# Patient Record
Sex: MALE | Race: WHITE | NOT HISPANIC OR LATINO | Employment: UNEMPLOYED | URBAN - METROPOLITAN AREA
[De-identification: names, ages, dates, MRNs, and addresses within clinical notes are randomized per-mention and may not be internally consistent; named-entity substitution may affect disease eponyms.]

---

## 2019-09-26 ENCOUNTER — OFFICE VISIT (OUTPATIENT)
Dept: ENDOCRINOLOGY | Facility: CLINIC | Age: 49
End: 2019-09-26
Payer: MEDICAID

## 2019-09-26 VITALS
DIASTOLIC BLOOD PRESSURE: 80 MMHG | RESPIRATION RATE: 18 BRPM | HEART RATE: 74 BPM | BODY MASS INDEX: 37.01 KG/M2 | HEIGHT: 74 IN | WEIGHT: 288.38 LBS | SYSTOLIC BLOOD PRESSURE: 130 MMHG

## 2019-09-26 DIAGNOSIS — E66.9 OBESITY (BMI 30-39.9): ICD-10-CM

## 2019-09-26 DIAGNOSIS — E11.65 TYPE 2 DIABETES MELLITUS WITH HYPERGLYCEMIA, WITHOUT LONG-TERM CURRENT USE OF INSULIN: Primary | ICD-10-CM

## 2019-09-26 PROCEDURE — 99203 OFFICE O/P NEW LOW 30 MIN: CPT | Mod: PBBFAC | Performed by: INTERNAL MEDICINE

## 2019-09-26 PROCEDURE — 99999 PR PBB SHADOW E&M-NEW PATIENT-LVL III: ICD-10-PCS | Mod: PBBFAC,,, | Performed by: INTERNAL MEDICINE

## 2019-09-26 PROCEDURE — 99204 PR OFFICE/OUTPT VISIT, NEW, LEVL IV, 45-59 MIN: ICD-10-PCS | Mod: S$PBB,,, | Performed by: INTERNAL MEDICINE

## 2019-09-26 PROCEDURE — 99204 OFFICE O/P NEW MOD 45 MIN: CPT | Mod: S$PBB,,, | Performed by: INTERNAL MEDICINE

## 2019-09-26 PROCEDURE — 99999 PR PBB SHADOW E&M-NEW PATIENT-LVL III: CPT | Mod: PBBFAC,,, | Performed by: INTERNAL MEDICINE

## 2019-09-26 RX ORDER — LORATADINE 10 MG/1
TABLET ORAL
COMMUNITY
Start: 2018-01-23

## 2019-09-26 RX ORDER — PEN NEEDLE, DIABETIC 30 GX3/16"
NEEDLE, DISPOSABLE MISCELLANEOUS
Qty: 100 EACH | Refills: 2 | Status: SHIPPED | OUTPATIENT
Start: 2019-09-26 | End: 2020-09-22 | Stop reason: SDUPTHER

## 2019-09-26 RX ORDER — ASPIRIN 81 MG/1
TABLET ORAL
COMMUNITY

## 2019-09-26 RX ORDER — METFORMIN HYDROCHLORIDE 1000 MG/1
TABLET ORAL
COMMUNITY
Start: 2017-05-10 | End: 2019-10-24 | Stop reason: SDUPTHER

## 2019-09-26 RX ORDER — LISINOPRIL 5 MG/1
TABLET ORAL
COMMUNITY
End: 2019-12-17

## 2019-09-26 RX ORDER — INSULIN LISPRO 100 [IU]/ML
INJECTION, SOLUTION INTRAVENOUS; SUBCUTANEOUS
Qty: 15 ML | Refills: 1 | Status: SHIPPED | OUTPATIENT
Start: 2019-09-26 | End: 2019-10-24

## 2019-09-26 RX ORDER — SIMVASTATIN 20 MG/1
TABLET, FILM COATED ORAL
COMMUNITY
Start: 2017-05-10 | End: 2020-10-07 | Stop reason: ALTCHOICE

## 2019-09-26 NOTE — PATIENT INSTRUCTIONS
Humalog fast acting Insulin :  7 UNITS AT BREAKFAST, AND  5 UNITS AT SUPPER only  Starting today.  In 2 days increase the dose by two units each if blood sugar does not improve a lot.    ( Today take with lunch 4 units only)    ------------------------------------------------------------  Trulicity injection once a week.  Pancreatitis is a rare complication.  ------------------------------------------------------------  Check your blood sugar before meals and at bedtime.

## 2019-09-26 NOTE — PROGRESS NOTES
Self referred   Patient ID: Jean Claude Ocasio is a 49 y.o. male.    Chief Complaint:    HPI  Patient visited emergency room 2 days ago because he had  Headache, lost voice, and sinus problem. Headache resolved.  Just some voice problem left. Feeling tired.  4 y ago c cold, bs increased a lot.  DM diagnosed: 7-8 years  Oral medication: Januvia 50 mg  and Metformin 1000 mg bid  Insulin: no   Frequency of CBGS: 3-4 times  CBGs: 160 am  300 post breakfast  Blood sugar does not increase about usually after supper  No complaints of hypoglycemia  Eye exam within last year: 3m ago.  blurred vision.  Kidney problem: no  Pain or numbness in feet: no  Taking blood pressure medication: occ  Taking cholesterol medication: not taking  Following diet for diabetes: yes    No complaints of  dysphagia, n/v, cp, sob, abd pain, rash, or edema.     No FH of pancreatitis, or thyroid cancer        Patient is a .  Social History     Socioeconomic History    Marital status:      Spouse name: Not on file    Number of children: Not on file    Years of education: Not on file    Highest education level: Not on file   Occupational History    Not on file   Social Needs    Financial resource strain: Not on file    Food insecurity:     Worry: Not on file     Inability: Not on file    Transportation needs:     Medical: Not on file     Non-medical: Not on file   Tobacco Use    Smoking status: Never Smoker   Substance and Sexual Activity    Alcohol use: Not on file    Drug use: Not on file    Sexual activity: Not on file   Lifestyle    Physical activity:     Days per week: Not on file     Minutes per session: Not on file    Stress: Not on file   Relationships    Social connections:     Talks on phone: Not on file     Gets together: Not on file     Attends Druze service: Not on file     Active member of club or organization: Not on file     Attends meetings of clubs or organizations: Not on file     Relationship  "status: Not on file   Other Topics Concern    Not on file   Social History Narrative    Not on file       ROS:   Included in HPI  + Diabetes   No chest pain or shortness of breath  No abdominal pain , nausea or vomiting  ROS otherwise neg except for what is mentioned in the PMH, PSH and HPI    PE:  Vitals:    09/26/19 0820   Resp: 18     Alert and oriented  No acute distress  + Obesity  Diffuse vitiligo in different areas of the body  Body mass index is 37.02 kg/m².  No Acanthosis  No acne  No Proptosis or conjunctivitis  No rash on tongue, +teeth  Nose nl  No goitre by inspection  Thyroid gland is not palpable  No cervical lymphadenopathy  Heart reg, no gallop  Lungs cta, no wheezing  Abd soft, no tnd  No edema in lower legs  No ulcers in feet  Normal sensation by microfilament test  No rash  No bruises  Speech normal  Behavior normal  No tremor      Lab Review:     A/P  Type 2 diabetes mellitus with hyperglycemia, without long-term current use of insulin  Uncontrolled diabetes  -     Comprehensive metabolic panel; Future; Expected date: 09/26/2019  -     Hemoglobin A1c; Future; Expected date: 09/26/2019    Obesity (BMI 30-39.9)  -     Comprehensive metabolic panel; Future; Expected date: 09/26/2019  -     Hemoglobin A1c; Future; Expected date: 09/26/2019    Other orders  -     dulaglutide (TRULICITY) 0.75 mg/0.5 mL PnIj; Inject 0.5 ml (0.75 MG) WEEKLY  Dispense: 2 mL; Refill: 2  -     insulin lispro (HUMALOG KWIKPEN INSULIN) 100 unit/mL pen; 5 UNITS AT BREAKFAST, AND 7 UNITS AT SUPPER  Dispense: 15 mL; Refill: 1  -     pen needle, diabetic 31 gauge x 5/16" Ndle; Use as directed  Dispense: 100 each; Refill: 2  Patient Instructions discussed     Humalog fast acting Insulin :  7 UNITS AT BREAKFAST, AND  5 UNITS AT SUPPER only  Starting today.  In 2 days increase the dose by two units each if blood sugar does not improve a lot.    ( Today take with lunch 4 units " only)    ------------------------------------------------------------  Trulicity injection once a week.  Pancreatitis is a rare complication.  ------------------------------------------------------------  Check your blood sugar before meals and at bedtime.  CBG log to be sent in couple days    Follow-up visit in 4 weeks.      Patient understands and agrees on the plan.

## 2019-09-27 ENCOUNTER — PATIENT MESSAGE (OUTPATIENT)
Dept: ENDOCRINOLOGY | Facility: CLINIC | Age: 49
End: 2019-09-27

## 2019-09-30 ENCOUNTER — TELEPHONE (OUTPATIENT)
Dept: ENDOCRINOLOGY | Facility: CLINIC | Age: 49
End: 2019-09-30

## 2019-09-30 RX ORDER — INSULIN GLARGINE 300 [IU]/ML
INJECTION, SOLUTION SUBCUTANEOUS
Qty: 15 ML | Refills: 1 | Status: SHIPPED | OUTPATIENT
Start: 2019-09-30 | End: 2019-10-01

## 2019-09-30 NOTE — TELEPHONE ENCOUNTER
I have discussed with the patient regarding his insulin pens.  Toujeo 10 units once a day to be added.

## 2019-10-01 RX ORDER — INSULIN GLARGINE 300 [IU]/ML
INJECTION, SOLUTION SUBCUTANEOUS
Qty: 6 ML | Refills: 1 | Status: SHIPPED | OUTPATIENT
Start: 2019-10-01 | End: 2019-10-24

## 2019-10-01 RX ORDER — INSULIN GLARGINE 300 [IU]/ML
INJECTION, SOLUTION SUBCUTANEOUS
Qty: 15 ML | Refills: 1 | OUTPATIENT
Start: 2019-10-01

## 2019-10-07 ENCOUNTER — TELEPHONE (OUTPATIENT)
Dept: ENDOCRINOLOGY | Facility: CLINIC | Age: 49
End: 2019-10-07

## 2019-10-07 ENCOUNTER — PATIENT MESSAGE (OUTPATIENT)
Dept: ENDOCRINOLOGY | Facility: CLINIC | Age: 49
End: 2019-10-07

## 2019-10-07 NOTE — TELEPHONE ENCOUNTER
return call to pt left message to call back or portal message if needs to speak to someone before Dr. pack gets in

## 2019-10-24 ENCOUNTER — LAB VISIT (OUTPATIENT)
Dept: LAB | Facility: HOSPITAL | Age: 49
End: 2019-10-24
Attending: INTERNAL MEDICINE
Payer: MEDICAID

## 2019-10-24 ENCOUNTER — OFFICE VISIT (OUTPATIENT)
Dept: ENDOCRINOLOGY | Facility: CLINIC | Age: 49
End: 2019-10-24
Payer: MEDICAID

## 2019-10-24 VITALS
HEIGHT: 74 IN | TEMPERATURE: 99 F | WEIGHT: 291 LBS | HEART RATE: 84 BPM | BODY MASS INDEX: 37.35 KG/M2 | DIASTOLIC BLOOD PRESSURE: 89 MMHG | SYSTOLIC BLOOD PRESSURE: 130 MMHG

## 2019-10-24 DIAGNOSIS — E11.65 UNCONTROLLED TYPE 2 DIABETES MELLITUS WITH HYPERGLYCEMIA: ICD-10-CM

## 2019-10-24 DIAGNOSIS — E11.65 TYPE 2 DIABETES MELLITUS WITH HYPERGLYCEMIA, WITHOUT LONG-TERM CURRENT USE OF INSULIN: ICD-10-CM

## 2019-10-24 DIAGNOSIS — E66.9 OBESITY (BMI 30-39.9): ICD-10-CM

## 2019-10-24 DIAGNOSIS — M79.672 PAIN OF LEFT HEEL: ICD-10-CM

## 2019-10-24 DIAGNOSIS — E11.65 TYPE 2 DIABETES MELLITUS WITH HYPERGLYCEMIA, WITHOUT LONG-TERM CURRENT USE OF INSULIN: Primary | ICD-10-CM

## 2019-10-24 LAB
ALBUMIN SERPL BCP-MCNC: 3.8 G/DL (ref 3.5–5.2)
ALP SERPL-CCNC: 56 U/L (ref 55–135)
ALT SERPL W/O P-5'-P-CCNC: 78 U/L (ref 10–44)
ANION GAP SERPL CALC-SCNC: 9 MMOL/L (ref 8–16)
AST SERPL-CCNC: 63 U/L (ref 10–40)
BILIRUB SERPL-MCNC: 0.3 MG/DL (ref 0.1–1)
BUN SERPL-MCNC: 14 MG/DL (ref 6–20)
CALCIUM SERPL-MCNC: 8.9 MG/DL (ref 8.7–10.5)
CHLORIDE SERPL-SCNC: 102 MMOL/L (ref 95–110)
CO2 SERPL-SCNC: 25 MMOL/L (ref 23–29)
CREAT SERPL-MCNC: 0.9 MG/DL (ref 0.5–1.4)
EST. GFR  (AFRICAN AMERICAN): >60 ML/MIN/1.73 M^2
EST. GFR  (NON AFRICAN AMERICAN): >60 ML/MIN/1.73 M^2
GLUCOSE SERPL-MCNC: 127 MG/DL (ref 70–110)
GLUCOSE SERPL-MCNC: 224 MG/DL (ref 70–110)
POTASSIUM SERPL-SCNC: 4 MMOL/L (ref 3.5–5.1)
PROT SERPL-MCNC: 7.2 G/DL (ref 6–8.4)
SODIUM SERPL-SCNC: 136 MMOL/L (ref 136–145)

## 2019-10-24 PROCEDURE — 99214 OFFICE O/P EST MOD 30 MIN: CPT | Mod: S$PBB,,, | Performed by: INTERNAL MEDICINE

## 2019-10-24 PROCEDURE — 80053 COMPREHEN METABOLIC PANEL: CPT

## 2019-10-24 PROCEDURE — 99999 PR PBB SHADOW E&M-EST. PATIENT-LVL III: CPT | Mod: PBBFAC,,, | Performed by: INTERNAL MEDICINE

## 2019-10-24 PROCEDURE — 36415 COLL VENOUS BLD VENIPUNCTURE: CPT

## 2019-10-24 PROCEDURE — 99999 PR PBB SHADOW E&M-EST. PATIENT-LVL III: ICD-10-PCS | Mod: PBBFAC,,, | Performed by: INTERNAL MEDICINE

## 2019-10-24 PROCEDURE — 99213 OFFICE O/P EST LOW 20 MIN: CPT | Mod: PBBFAC | Performed by: INTERNAL MEDICINE

## 2019-10-24 PROCEDURE — 83036 HEMOGLOBIN GLYCOSYLATED A1C: CPT

## 2019-10-24 PROCEDURE — 82962 GLUCOSE BLOOD TEST: CPT | Mod: PBBFAC | Performed by: INTERNAL MEDICINE

## 2019-10-24 PROCEDURE — 99214 PR OFFICE/OUTPT VISIT, EST, LEVL IV, 30-39 MIN: ICD-10-PCS | Mod: S$PBB,,, | Performed by: INTERNAL MEDICINE

## 2019-10-24 RX ORDER — BLOOD-GLUCOSE CONTROL, NORMAL
EACH MISCELLANEOUS
Qty: 150 EACH | Refills: 5 | Status: SHIPPED | OUTPATIENT
Start: 2019-10-24 | End: 2021-07-09 | Stop reason: SDUPTHER

## 2019-10-24 RX ORDER — INSULIN PUMP SYRINGE, 3 ML
EACH MISCELLANEOUS
Qty: 1 EACH | Refills: 0 | Status: SHIPPED | OUTPATIENT
Start: 2019-10-24 | End: 2020-11-09 | Stop reason: SDUPTHER

## 2019-10-24 RX ORDER — GLIMEPIRIDE 4 MG/1
4 TABLET ORAL
Qty: 135 TABLET | Refills: 1 | Status: SHIPPED | OUTPATIENT
Start: 2019-10-24 | End: 2020-01-21 | Stop reason: SDUPTHER

## 2019-10-24 RX ORDER — GLIMEPIRIDE 4 MG/1
4 TABLET ORAL
Qty: 135 TABLET | Refills: 1 | Status: SHIPPED | OUTPATIENT
Start: 2019-10-24 | End: 2019-10-24 | Stop reason: SDUPTHER

## 2019-10-24 RX ORDER — METFORMIN HYDROCHLORIDE 1000 MG/1
TABLET ORAL
Qty: 60 TABLET | Refills: 2 | Status: SHIPPED | OUTPATIENT
Start: 2019-10-24 | End: 2019-10-29 | Stop reason: SDUPTHER

## 2019-10-24 NOTE — PROGRESS NOTES
Self referred   Patient ID: Jean Claude Ocasio is a 49 y.o. male.    Chief Complaint:    HPI  DM diagnosed: 7-8 years  Oral medication:   Off Januvia 50 mg  Metformin 1000 mg bid  ? Glyburide 4 mg BID  PATIENT QUIT TAKING INSULIN INJECTIONS FEW DAYS AFTER LAST VISIT BECAUSE  HE WAS NOT FEELING WELL AND HE FELT LIKE A DIFFERENT PERSON, AND EVEN HIS  WIFE ASKED HIM WHAT WAS WRONG WITH HIM.  HIS BLOOD SUGAR PROBABLY WAS  IN 80S SOMETIMES WHEN HE WAS ON INSULIN.  IT IS NOT CLEAR IF HE HAD HYPOGLYCEMIA  HE DECIDED TO STOP THE INSULIN AND TO TAKE ONLY METFORMIN AND  HE STARTED TAKING GLYBURIDE 4 MG TWICE A DAY PER PATIENT  IT IS NOT CLEAR IF HE MEANS GLIMEPIRIDE INSTEAD OF GLYBURIDE  HIS BLOOD SUGAR RECENTLY HAS BEEN 100-170  NO CBG LOG AVAILABLE  Frequency of CBGS 4 times  Kidney problem: no  Pain or numbness in feet: no  Taking blood pressure medication: occ  Taking cholesterol medication: not taking    No complaints of  dysphagia, n/v, cp, sob, abd pain, rash, or edema.     No FH of pancreatitis, or thyroid cancer        Patient is a .  Social History     Socioeconomic History    Marital status:      Spouse name: Not on file    Number of children: Not on file    Years of education: Not on file    Highest education level: Not on file   Occupational History    Not on file   Social Needs    Financial resource strain: Not on file    Food insecurity:     Worry: Not on file     Inability: Not on file    Transportation needs:     Medical: Not on file     Non-medical: Not on file   Tobacco Use    Smoking status: Never Smoker   Substance and Sexual Activity    Alcohol use: Not on file    Drug use: Not on file    Sexual activity: Not on file   Lifestyle    Physical activity:     Days per week: Not on file     Minutes per session: Not on file    Stress: Not on file   Relationships    Social connections:     Talks on phone: Not on file     Gets together: Not on file     Attends Islam service: Not  on file     Active member of club or organization: Not on file     Attends meetings of clubs or organizations: Not on file     Relationship status: Not on file   Other Topics Concern    Not on file   Social History Narrative    Not on file       ROS:   Included in HPI  + Diabetes   No chest pain or shortness of breath  No abdominal pain , nausea or vomiting  CHRONIC PAIN IN LEFT HEEL  Left heel pain occurs for like 1 month and it is mostly 1st thing in the morning  ROS otherwise neg except for what is mentioned in the PMH, PSH and HPI    PE:  Vitals:    10/24/19 1505   BP: 130/89   Pulse: 84   Temp: 98.7 °F (37.1 °C)     Alert and oriented  No acute distress  + Obesity  Diffuse vitiligo in different areas of the body  Body mass index is 37.36 kg/m².  No Proptosis or conjunctivitis  No goitre by inspection  Thyroid gland is not palpable  Heart reg, no gallop  Lungs cta, no wheezing  No edema in lower legs  Normal sensation by microfilament test  Speech normal  Behavior normal  No tremor      Lab Review:     A/P  Type 2 diabetes mellitus with hyperglycemia, without long-term current use of insulin  Uncontrolled diabetes  Obesity (BMI 30-39.9)  OFF INSULIN SEC TO SIDE EFFECTS  Patient does not want to take insulin and he was to continue taking metformin and glyburide or glimepiride  Off Trulicity injection   Blood test from today is pending    Pain in left heel is likely secondary to Achilles tendonitis  Orders Placed This Encounter   Procedures    Ambulatory Referral to Podiatry     Referral Priority:   Routine     Referral Type:   Consultation     Referral Reason:   Specialty Services Required     Requested Specialty:   Podiatry     Number of Visits Requested:   1       Patient is tablet she primary care physician.  Follow-up visit in 3 months.      Patient understands and agrees on the plan.

## 2019-10-25 LAB
ESTIMATED AVG GLUCOSE: 174 MG/DL (ref 68–131)
HBA1C MFR BLD HPLC: 7.7 % (ref 4–5.6)

## 2019-10-28 ENCOUNTER — HOSPITAL ENCOUNTER (OUTPATIENT)
Dept: RADIOLOGY | Facility: HOSPITAL | Age: 49
Discharge: HOME | End: 2019-10-28
Attending: PODIATRIST
Payer: MEDICAID

## 2019-10-28 ENCOUNTER — OFFICE VISIT (OUTPATIENT)
Dept: PODIATRY | Facility: CLINIC | Age: 49
End: 2019-10-28
Payer: MEDICAID

## 2019-10-28 VITALS
HEIGHT: 74 IN | SYSTOLIC BLOOD PRESSURE: 137 MMHG | WEIGHT: 290.44 LBS | HEART RATE: 87 BPM | DIASTOLIC BLOOD PRESSURE: 88 MMHG | BODY MASS INDEX: 37.27 KG/M2

## 2019-10-28 DIAGNOSIS — M79.672 PAIN OF LEFT HEEL: ICD-10-CM

## 2019-10-28 DIAGNOSIS — M72.2 PLANTAR FASCIITIS: Primary | ICD-10-CM

## 2019-10-28 DIAGNOSIS — M79.672 PAIN OF LEFT HEEL: Primary | ICD-10-CM

## 2019-10-28 DIAGNOSIS — M79.673 INFLAMMATORY HEEL PAIN, UNSPECIFIED LATERALITY: ICD-10-CM

## 2019-10-28 PROCEDURE — 99999 PR PBB SHADOW E&M-EST. PATIENT-LVL III: ICD-10-PCS | Mod: PBBFAC,,, | Performed by: PODIATRIST

## 2019-10-28 PROCEDURE — 99203 OFFICE O/P NEW LOW 30 MIN: CPT | Mod: S$PBB,,, | Performed by: PODIATRIST

## 2019-10-28 PROCEDURE — 73650 X-RAY EXAM OF HEEL: CPT | Mod: 26,LT,, | Performed by: RADIOLOGY

## 2019-10-28 PROCEDURE — 99203 PR OFFICE/OUTPT VISIT, NEW, LEVL III, 30-44 MIN: ICD-10-PCS | Mod: S$PBB,,, | Performed by: PODIATRIST

## 2019-10-28 PROCEDURE — 73650 XR CALCANEUS 2 VIEW LEFT: ICD-10-PCS | Mod: 26,LT,, | Performed by: RADIOLOGY

## 2019-10-28 PROCEDURE — 73650 X-RAY EXAM OF HEEL: CPT | Mod: TC,LT

## 2019-10-28 PROCEDURE — 99999 PR PBB SHADOW E&M-EST. PATIENT-LVL III: CPT | Mod: PBBFAC,,, | Performed by: PODIATRIST

## 2019-10-28 PROCEDURE — 99213 OFFICE O/P EST LOW 20 MIN: CPT | Mod: PBBFAC,25 | Performed by: PODIATRIST

## 2019-10-28 RX ORDER — MELOXICAM 15 MG/1
15 TABLET ORAL DAILY
Qty: 20 TABLET | Refills: 0 | Status: SHIPPED | OUTPATIENT
Start: 2019-10-28 | End: 2019-12-02 | Stop reason: SDUPTHER

## 2019-10-28 NOTE — PROGRESS NOTES
Subjective:       Patient ID: Jean Claude Ocasio is a 49 y.o. male.    Chief Complaint: Foot Pain (Pt c/o heel pain in the R. foot, rates pain 8/10, wears slippers w/o socks,diabetic pt, PCP DR. martn't have one.)      HPI: Jean Claude Ocasio complains of increased pains to the right and left foot. States pains are sharp and stabbing-like in nature. Pains are to the plantar foot, mostly with walking and standing. Rates the pains at approx. 8/10. States post-static dyskinesia. Denies any recent identifiable trauma. Does limp with gait. No NSAID medications thus far. Pains have been present for the past several weeks to months and the pains have worsened over the past couple of weeks. Patient does not have a history of plantar fasciitis. States walking and standing causes and/or exacerbates the symptoms. Patient's Primary Care Provider is Primary Doctor No.     Review of patient's allergies indicates:   Allergen Reactions    Codeine Rash    Hydrocodone-acetaminophen Rash       History reviewed. No pertinent past medical history.    History reviewed. No pertinent family history.    Social History     Socioeconomic History    Marital status:      Spouse name: Not on file    Number of children: Not on file    Years of education: Not on file    Highest education level: Not on file   Occupational History    Not on file   Social Needs    Financial resource strain: Not on file    Food insecurity:     Worry: Not on file     Inability: Not on file    Transportation needs:     Medical: Not on file     Non-medical: Not on file   Tobacco Use    Smoking status: Never Smoker   Substance and Sexual Activity    Alcohol use: Not on file    Drug use: Not on file    Sexual activity: Not on file   Lifestyle    Physical activity:     Days per week: Not on file     Minutes per session: Not on file    Stress: Not on file   Relationships    Social connections:     Talks on phone: Not on file     Gets together: Not on file  "    Attends Taoism service: Not on file     Active member of club or organization: Not on file     Attends meetings of clubs or organizations: Not on file     Relationship status: Not on file   Other Topics Concern    Not on file   Social History Narrative    Not on file       History reviewed. No pertinent surgical history.    Review of Systems      Objective:   /88 (BP Location: Right arm, Patient Position: Sitting, BP Method: Medium (Automatic))   Pulse 87   Ht 6' 2" (1.88 m)   Wt 131.8 kg (290 lb 7.3 oz)   BMI 37.29 kg/m²     X-Ray Calcaneus 2 View Left  Narrative: EXAMINATION:  XR CALCANEUS 2 VIEW LEFT    CLINICAL HISTORY:  Pain in left foot    TECHNIQUE:  Tangential and lateral views of the left calcaneus were performed.    COMPARISON:  None    FINDINGS:  No acute fracture or dislocation.  No tarsal coalition suggested.  Small dorsal and plantar calcaneal enthesophytes are present.  Impression: As above    Electronically signed by: Carl Holguin DO  Date:    10/28/2019  Time:    14:18      Physical Exam  LOWER EXTREMITY PHYSICAL EXAMINATION    VASCULAR: The right DP pulse is 2/4 and the left DP is 2/4. The right PT pulse is 2/4 and the left PT pulse is 2/4. Proximal to distal, warm to warm. No dependent rubor or elevation palor is noted. Capillary refill time is less than 3 seconds. Hair growth is appreciated to the dorsal foot and digits.    NEUROLOGY: Proprioception is intact, bilateral. Sensation to light touch is intact. Negative Tinel's Sign and negative Valleix Sign. No neurological sensations with compression of the area of Brown's Nerve in the area of the Abductor Hallucis muscle belly.    ORTHOPEDIC: Increased tenderness to palpation of the area around the plantar medial calcaneal tubercle on the right and left foot. Pains are characterized as sharp and stabbing-like with direct palpation of the area. There is also moderate pain to palpation of the immediate plantar aspect of the " heel, and no pains to the lateral band of the fascia. No edema is noted. No fullness is noted. No pains or defects are noted within the plantar fascia at the arch. No plantar fibromas are noted. No defects are noted within the ligament. Dorsiflexion of the MTPJs with simultaneous palpation of the fascia at the arch, does worsen and exacerbate the pains. No pains with medial to lateral compression of the heel. Equinus contracture is noted. Antalgic gait pattern is noted.     DERMATOLOGY: No ecchymosis is noted.  Skin is supple, dry and intact. Skin is supple.  No hyperkeratosis noted. No calluses.  No open wounds or ulcerations are noted.  No palpable plantar fibromas noted.    Assessment:     1. Plantar fasciitis    2. Inflammatory heel pain, unspecified laterality          Plan:     Plantar fasciitis    Inflammatory heel pain, unspecified laterality    Other orders  -     meloxicam (MOBIC) 15 MG tablet; Take 1 tablet (15 mg total) by mouth once daily.  Dispense: 20 tablet; Refill: 0     X-rays were reviewed by me in clinic.  There is no identifiable pathology.  There is small exostosis present to the posterior plantar and posterior superior aspect the calcaneus.    I explained to the patient that etiology and treatment options for heel pain including rest,  ice messages, stretching exercises, strappings/tappings, NSAID's, injections, new shoegear with orthotic inserts, and/or surgical treatment. I also discussed a possible injection of steroid to help calm down the inflammation sooner. I expressed the importance of wearing the orthotics in culmination of other treatment modalities. Patient agreed to stretching exercises and inserts. I gave written and verbal instructions on heel cord stretching and this was demonstrated for the patient. Patient expressed understanding and had the patient teach back the instructions.  Patient instructed on adequate icing techniques which should be done for acute pain and  inflammation.    We discussed the importance of wearing the orthotics to help elevate the arch which will allow for tension to be lessened to the plantar fascia and posterior heel cord.  Discussed the importance of the break-in period with the inserts by wearing them 2-3 hours for the 1st day and increasing their use an hour each day until able to tolerate a full work period.      Future Appointments   Date Time Provider Department Center   12/2/2019  1:00 PM Jyoti Corbett DPM [facility]   12/17/2019 10:00 AM Jennifer Barrientos MD [facility] [place]   1/21/2020  9:30 AM Payton Del Angel MD [facility] [place]

## 2019-10-28 NOTE — LETTER
October 28, 2019      Payton Del Angel MD  [address]  [place] [address]           [facility]  [address] [place] [id number]  Phone: [phone]  Fax: [phone]          Patient: Jean Claude Ocasio   MR Number: [id number]   YOB: 1970   Date of Visit: 10/28/2019       Dear Dr. Payton Del Angel:    Thank you for referring Jean Claude Ocasio to me for evaluation. Attached you will find relevant portions of my assessment and plan of care.    If you have questions, please do not hesitate to call me. I look forward to following Jean Claude Ocasio along with you.    Sincerely,    Jyoti Corbett, GIANCARLO    Enclosure  CC:  No Recipients    If you would like to receive this communication electronically, please contact [email or web address] or [phone] to request more information on [employer] Link access.    For providers and/or their staff who would like to refer a patient to [facility], please contact us through our one-stop-shop provider referral line, [facility], at [phone].    If you feel you have received this communication in error or would no longer like to receive these types of communications, please e-mail [email or web address]

## 2019-10-29 RX ORDER — METFORMIN HYDROCHLORIDE 1000 MG/1
TABLET ORAL
Qty: 30 TABLET | Refills: 1 | Status: SHIPPED | OUTPATIENT
Start: 2019-10-29 | End: 2020-01-21 | Stop reason: SDUPTHER

## 2019-12-02 ENCOUNTER — OFFICE VISIT (OUTPATIENT)
Dept: PODIATRY | Facility: CLINIC | Age: 49
End: 2019-12-02
Payer: MEDICAID

## 2019-12-02 VITALS
HEIGHT: 74 IN | DIASTOLIC BLOOD PRESSURE: 90 MMHG | HEART RATE: 88 BPM | WEIGHT: 295.63 LBS | BODY MASS INDEX: 37.94 KG/M2 | SYSTOLIC BLOOD PRESSURE: 137 MMHG

## 2019-12-02 DIAGNOSIS — M72.2 PLANTAR FASCIITIS: Primary | ICD-10-CM

## 2019-12-02 DIAGNOSIS — E66.9 OBESITY (BMI 30-39.9): ICD-10-CM

## 2019-12-02 PROCEDURE — 99213 OFFICE O/P EST LOW 20 MIN: CPT | Mod: PBBFAC | Performed by: PODIATRIST

## 2019-12-02 PROCEDURE — 99214 OFFICE O/P EST MOD 30 MIN: CPT | Mod: S$PBB,,, | Performed by: PODIATRIST

## 2019-12-02 PROCEDURE — 99999 PR PBB SHADOW E&M-EST. PATIENT-LVL III: ICD-10-PCS | Mod: PBBFAC,,, | Performed by: PODIATRIST

## 2019-12-02 PROCEDURE — 99999 PR PBB SHADOW E&M-EST. PATIENT-LVL III: CPT | Mod: PBBFAC,,, | Performed by: PODIATRIST

## 2019-12-02 PROCEDURE — 99214 PR OFFICE/OUTPT VISIT, EST, LEVL IV, 30-39 MIN: ICD-10-PCS | Mod: S$PBB,,, | Performed by: PODIATRIST

## 2019-12-02 RX ORDER — MELOXICAM 15 MG/1
15 TABLET ORAL DAILY
Qty: 30 TABLET | Refills: 0 | Status: SHIPPED | OUTPATIENT
Start: 2019-12-02 | End: 2019-12-17 | Stop reason: SDUPTHER

## 2019-12-02 NOTE — PROGRESS NOTES
Subjective:       Patient ID: Jean Claude Ocasio is a 49 y.o. male.    Chief Complaint: Follow-up (Left heel plantar fasciitis. Pt reports slight improvement stating pain all around the heel. Rates pain 3/10. Diabetic pt. No primary Dr.)      HPI: Jean Claude Ocasio presents to clinic with left plantar fasciitis.  He states that he was utilizing the inserts for 3-4 hours per day, but was able to wear them longer per day. He also reports that he has been doing stretching exercise once he gets home, but does not do them while at work.  He reports that he does ice and did take the oral anti-inflammatories as prescribed.  Patient states that his pain is approximately 50-60% better and states that his pain is a 3/10 currently.  He states that the icing and stretching at the end of the day does help relieve some symptoms.  He states that he is having significantly better post static dyskinesia and is able to ambulate without pain.  Patient's Primary Care Provider is Primary Doctor No.     Review of patient's allergies indicates:   Allergen Reactions    Codeine Rash    Hydrocodone-acetaminophen Rash       Past Medical History:   Diagnosis Date    Diabetes mellitus, type 2        History reviewed. No pertinent family history.    Social History     Socioeconomic History    Marital status:      Spouse name: Not on file    Number of children: Not on file    Years of education: Not on file    Highest education level: Not on file   Occupational History    Not on file   Social Needs    Financial resource strain: Not on file    Food insecurity:     Worry: Not on file     Inability: Not on file    Transportation needs:     Medical: Not on file     Non-medical: Not on file   Tobacco Use    Smoking status: Never Smoker   Substance and Sexual Activity    Alcohol use: Not on file    Drug use: Not on file    Sexual activity: Not on file   Lifestyle    Physical activity:     Days per week: Not on file     Minutes per  "session: Not on file    Stress: Not on file   Relationships    Social connections:     Talks on phone: Not on file     Gets together: Not on file     Attends Confucianist service: Not on file     Active member of club or organization: Not on file     Attends meetings of clubs or organizations: Not on file     Relationship status: Not on file   Other Topics Concern    Not on file   Social History Narrative    Not on file       History reviewed. No pertinent surgical history.    Review of Systems   Constitutional: Negative for activity change, appetite change, chills and fever.   HENT: Negative for sinus pain, sore throat and voice change.    Eyes: Negative for pain, redness and visual disturbance.   Respiratory: Negative for cough and shortness of breath.    Cardiovascular: Negative for chest pain and palpitations.   Gastrointestinal: Negative for diarrhea, nausea and vomiting.   Musculoskeletal: Positive for back pain. Negative for joint swelling.   Skin: Negative for color change and wound.   Neurological: Negative for dizziness, weakness and numbness.   Psychiatric/Behavioral: The patient is not nervous/anxious.          Objective:   BP (!) 137/90   Pulse 88   Ht 6' 2" (1.88 m)   Wt 134.1 kg (295 lb 10.2 oz)   BMI 37.96 kg/m²       Physical Exam  LOWER EXTREMITY PHYSICAL EXAMINATION    VASCULAR: The  left DP is 2/4 and the left PT pulse is 2/4. Proximal to distal, warm to warm. No dependent rubor or elevation palor is noted. Capillary refill time is less than 3 seconds. Hair growth is appreciated to the dorsal foot and digits.    NEUROLOGY: Proprioception is intact, bilateral. Sensation to light touch is intact. Negative Tinel's Sign and negative Valleix Sign. No neurological sensations with compression of the area of Brown's Nerve in the area of the Abductor Hallucis muscle belly.    ORTHOPEDIC: MIld tenderness to palpation of the area around the plantar medial calcaneal tubercle on the left foot. There is " no pain to the central band or the lateral band of the plantar fascia.  No edema is noted. No fullness is noted. No pains or defects are noted within the plantar fascia at the arch. No plantar fibromas are noted. No defects are noted within the ligament. No pains with medial to lateral compression of the heel. Equinus contracture is noted. Nonantalgic gait pattern is noted.     DERMATOLOGY: No ecchymosis is noted.  Skin is supple, dry and intact. Skin is supple.  No hyperkeratosis noted. No calluses.  No open wounds or ulcerations are noted.  No palpable plantar fibromas noted.    Assessment:     1. Plantar fasciitis - Left Foot    2. Obesity (BMI 30-39.9)          Plan:     Plantar fasciitis - Left Foot    Obesity (BMI 30-39.9)    Other orders  -     meloxicam (MOBIC) 15 MG tablet; Take 1 tablet (15 mg total) by mouth once daily.  Dispense: 30 tablet; Refill: 0     Continued discussed the importance of wearing the inserts throughout the day.  I do understand that if he does have increased pain after 3 or 4 hour cele, that he takes an now, however I think that wearing them for longer periods of day can help relieve the excessive tension being applied to the plantar fascia.  Recommend a continues wearing the inserts, performing his stretching exercises 4-5 times per day, and icing when needed.  I did place a refill on the meloxicam as patient states that he got a approximately 50-60% better.  We did discuss injection today, however patient states that with his improvement, he would like to see if he can have additional improvement prior to having an injection into the left heel.    We discussed with him that obesity can play a role in applying excessive attention to the plantar aspect of the feet which can exacerbate plantar fasciitis.    Patient follow-up as needed which point we would perform an injection left heel if continues to have pain        Future Appointments   Date Time Provider Department Center    12/17/2019 10:00 AM Jennifer Barrientos MD [facility] [place]   1/21/2020  9:30 AM Payton Del Angel MD [facility]

## 2019-12-17 ENCOUNTER — OFFICE VISIT (OUTPATIENT)
Dept: INTERNAL MEDICINE | Facility: CLINIC | Age: 49
End: 2019-12-17
Payer: MEDICAID

## 2019-12-17 ENCOUNTER — HOSPITAL ENCOUNTER (OUTPATIENT)
Dept: RADIOLOGY | Facility: HOSPITAL | Age: 49
Discharge: HOME | End: 2019-12-17
Attending: FAMILY MEDICINE
Payer: MEDICAID

## 2019-12-17 VITALS
WEIGHT: 300.94 LBS | HEART RATE: 77 BPM | OXYGEN SATURATION: 98 % | SYSTOLIC BLOOD PRESSURE: 146 MMHG | TEMPERATURE: 99 F | HEIGHT: 74 IN | BODY MASS INDEX: 38.62 KG/M2 | DIASTOLIC BLOOD PRESSURE: 92 MMHG

## 2019-12-17 DIAGNOSIS — M54.50 CHRONIC LEFT-SIDED LOW BACK PAIN WITHOUT SCIATICA: ICD-10-CM

## 2019-12-17 DIAGNOSIS — E11.65 TYPE 2 DIABETES MELLITUS WITH HYPERGLYCEMIA, WITHOUT LONG-TERM CURRENT USE OF INSULIN: ICD-10-CM

## 2019-12-17 DIAGNOSIS — G89.29 CHRONIC LEFT-SIDED LOW BACK PAIN WITHOUT SCIATICA: ICD-10-CM

## 2019-12-17 DIAGNOSIS — I10 ESSENTIAL HYPERTENSION: Primary | ICD-10-CM

## 2019-12-17 DIAGNOSIS — E55.9 VITAMIN D DEFICIENCY: ICD-10-CM

## 2019-12-17 PROCEDURE — 72110 X-RAY EXAM L-2 SPINE 4/>VWS: CPT | Mod: 26,,, | Performed by: RADIOLOGY

## 2019-12-17 PROCEDURE — 99999 PR PBB SHADOW E&M-EST. PATIENT-LVL IV: ICD-10-PCS | Mod: PBBFAC,,, | Performed by: FAMILY MEDICINE

## 2019-12-17 PROCEDURE — 99204 PR OFFICE/OUTPT VISIT, NEW, LEVL IV, 45-59 MIN: ICD-10-PCS | Mod: S$PBB,,, | Performed by: FAMILY MEDICINE

## 2019-12-17 PROCEDURE — 99204 OFFICE O/P NEW MOD 45 MIN: CPT | Mod: S$PBB,,, | Performed by: FAMILY MEDICINE

## 2019-12-17 PROCEDURE — 72110 X-RAY EXAM L-2 SPINE 4/>VWS: CPT | Mod: TC

## 2019-12-17 PROCEDURE — 72110 XR LUMBAR SPINE COMPLETE 5 VIEW: ICD-10-PCS | Mod: 26,,, | Performed by: RADIOLOGY

## 2019-12-17 PROCEDURE — 99999 PR PBB SHADOW E&M-EST. PATIENT-LVL IV: CPT | Mod: PBBFAC,,, | Performed by: FAMILY MEDICINE

## 2019-12-17 PROCEDURE — 99214 OFFICE O/P EST MOD 30 MIN: CPT | Mod: PBBFAC,25 | Performed by: FAMILY MEDICINE

## 2019-12-17 RX ORDER — LISINOPRIL 20 MG/1
20 TABLET ORAL DAILY
Qty: 90 TABLET | Refills: 4 | Status: SHIPPED | OUTPATIENT
Start: 2019-12-17 | End: 2019-12-31 | Stop reason: SDUPTHER

## 2019-12-17 RX ORDER — MELOXICAM 15 MG/1
15 TABLET ORAL DAILY
Qty: 30 TABLET | Refills: 0 | Status: SHIPPED | OUTPATIENT
Start: 2019-12-17 | End: 2019-12-31 | Stop reason: SDUPTHER

## 2019-12-17 RX ORDER — BACLOFEN 10 MG/1
10 TABLET ORAL 3 TIMES DAILY PRN
Qty: 90 TABLET | Refills: 11 | Status: SHIPPED | OUTPATIENT
Start: 2019-12-17 | End: 2023-02-27

## 2019-12-17 NOTE — PATIENT INSTRUCTIONS
Understanding Lumbosacral Strain    Lumbosacral strain is a medical term for an injury that causes low back pain. The lumbosacral area (low back) is between the bottom of the ribcage and the top of the buttocks. A strain is tearing of muscles and tendons. These tears can be very small but still cause pain.  How a lumbosacral strain happens  Muscles and tendons connected to the spine can be strained in a number of ways:  · Sitting or standing in the same position for long periods of time. This can harm the low back over time. Poor posture can make low back pain more likely.  · Moving the muscles and tendons past their usual range of motion. This can cause a sudden injury. This can happen when you twist, bend over, or lift something heavy. Not using correct technique for sports or tasks like lifting can make back injury more likely.  · Accidents or falls  Lumbosacral strain can be caused by other problems, but these are less common.  Symptoms of lumbosacral strain  Symptoms may include:  · Pain in the back, often on one side  · Pain that gets worse with movement and gets better with rest  · Inability to move as freely as usual  · Swelling, slight redness, and skin warmth in the painful area  Treatment for lumbosacral strain  Low back pain often goes away by itself within several weeks. But it often comes back. Treatment focuses on reducing pain and avoiding further injury. Bed rest is usually not recommended for low back pain. Treatments may include:  · Avoiding or changing the action that caused the problem. This helps prevent injuring the tissues again.  · Prescription or over-the-counter pain medicines. These help reduce inflammation, swelling, and pain.  · Cold or heat packs. These help reduce pain and swelling.  · Stretching and other exercises. These improve flexibility and strength.  · Physical therapy. This usually includes exercises and other treatments.  · Injections of medicine. This may relieve  symptoms.  If these treatments do not relieve symptoms, your healthcare provider may order imaging tests to learn more about the problem. Sometimes you may need surgery.  Possible complications of lumbosacral strain  If the cause of the pain is not addressed, symptoms may return or get worse. Follow your healthcare providers instructions on lifestyle changes and treating your back.     When to call your healthcare provider  Call your healthcare provider right away if you have any of these:  · Fever of 100.4°F (38°C) or higher, or as directed  · Numbness, tingling, or weakness  · Problems with bowel or bladder control, or problems having sex  · Pain that does not go away, or gets worse  · New symptoms   Date Last Reviewed: 3/10/2016  © 8948-9458 [employer]. [address] [place] [address], [place] [address]. All rights reserved. This information is not intended as a substitute for professional medical care. Always follow your healthcare professional's instructions.      Back Exercise to Keep Fit for Low Back Pain  To help in your recovery and to prevent further back problems, keep your back, abdominal muscles and legs strong. Walk daily as soon as you can. Gradually add other physical activities such as swimming and biking, which can help improve lower back strength. Begin as soon as you can do them comfortably. Do not do any exercises that make your pain a lot worse. The following are some back exercises that can help relieve low back pain.     Pelvic tilt   Repeat 5-10 times, twice per day.  Lie flat on your back (or stand with your back to a wall), knees bent, feet flat on the floor, body relaxed. Tighten your abdominal and buttock muscles, and tilt your pelvis. The curve of the small of your back should flatten towards the floor (or wall). Hold 10 seconds and then relax.       Knee raise     Repeat 5-10 times, twice per day.    Lie flat on your back, knees bent. Bring one knee slowly to your chest. Luh  your knee gently. Then lower your leg toward the floor, keeping your knee bent. Do not straighten your legs. Repeat exercise with your other leg.              Partial press-up     Lie face down on a soft, firm surface. Do not turn your head to either side. Rest your arms bent at the elbows alongside your body. Relax for a few minutes. Then raise your upper body enough to lean on your elbows. Relax your lower back and legs as much as possible. Hold this position for 30 seconds at first. Gradually work up to five minutes. Or try slow press-ups. Hold each for five seconds, and repeat five to six times.              Copyright © 2012 by [employer]   Julio Cesar VIDES, Jenniffer D, Zina J, Akilah B, Evan R, Luis B, Edmond K, Kye C, Valarie B, Thomas S, Robert L, Harshal GRANADO. [employer]. Adult Acute and Subacute Low Back Pain. Updated November 2012.

## 2019-12-17 NOTE — PROGRESS NOTES
Subjective:       Patient ID: Jean Claude Ocasio is a 49 y.o. male.    Chief Complaint: Establish Care    48 yo male here to establish care. He has h/o DM2, HTN. He has 2 month history of low back pain on left side. No numbness, tingling, or weakness. He has taken muscle relaxer which did not help. Nothing over the counter has helped. He sees Dr. Del Angel for diabetes management. Has had difficulty losing weight. BP has been running high, but only on low dose lisinopril.     does not have any pertinent problems on file.  Past Medical History:   Diagnosis Date    Diabetes mellitus, type 2      No past surgical history on file.  No family history on file.  Social History     Socioeconomic History    Marital status:      Spouse name: Not on file    Number of children: Not on file    Years of education: Not on file    Highest education level: Not on file   Occupational History    Not on file   Social Needs    Financial resource strain: Not on file    Food insecurity:     Worry: Not on file     Inability: Not on file    Transportation needs:     Medical: Not on file     Non-medical: Not on file   Tobacco Use    Smoking status: Never Smoker   Substance and Sexual Activity    Alcohol use: Not on file    Drug use: Not on file    Sexual activity: Not on file   Lifestyle    Physical activity:     Days per week: Not on file     Minutes per session: Not on file    Stress: Not on file   Relationships    Social connections:     Talks on phone: Not on file     Gets together: Not on file     Attends Zoroastrian service: Not on file     Active member of club or organization: Not on file     Attends meetings of clubs or organizations: Not on file     Relationship status: Not on file   Other Topics Concern    Not on file   Social History Narrative    Not on file     Review of Systems   Constitutional: Negative for fatigue and unexpected weight change.   HENT: Negative for dental problem and hearing loss.    Eyes:  Negative for pain and visual disturbance.   Respiratory: Negative for shortness of breath and wheezing.    Cardiovascular: Negative for chest pain and palpitations.   Gastrointestinal: Negative for abdominal pain, constipation and diarrhea.   Genitourinary: Negative for difficulty urinating and dysuria.   Musculoskeletal: Positive for arthralgias and back pain. Negative for joint swelling and myalgias.   Skin: Negative for rash and wound.   Neurological: Negative for light-headedness and headaches.   Psychiatric/Behavioral: Negative for dysphoric mood. The patient is not nervous/anxious.        Objective:     Vitals:    12/17/19 1014   BP: (!) 146/92   Pulse: 77   Temp: 98.6 °F (37 °C)        Physical Exam   Constitutional: He is oriented to person, place, and time. He appears well-developed and well-nourished.   HENT:   Head: Normocephalic and atraumatic.   Eyes: Pupils are equal, round, and reactive to light. EOM are normal.   Neck: Normal range of motion. Neck supple.   Cardiovascular: Normal rate and regular rhythm.   Pulmonary/Chest: Effort normal and breath sounds normal.   Abdominal: Soft. Bowel sounds are normal.   Musculoskeletal: Normal range of motion. He exhibits no deformity.   Neurological: He is alert and oriented to person, place, and time.   Skin: Skin is warm and dry.   Psychiatric: He has a normal mood and affect. His behavior is normal.   Nursing note and vitals reviewed.      Assessment:       1. Essential hypertension    2. Type 2 diabetes mellitus with hyperglycemia, without long-term current use of insulin    3. Chronic left-sided low back pain without sciatica    4. Vitamin D deficiency        Plan:           Problem List Items Addressed This Visit        Endocrine    Type 2 diabetes mellitus with hyperglycemia, without long-term current use of insulin    Relevant Medications    lisinopril (PRINIVIL,ZESTRIL) 20 MG tablet    Other Relevant Orders    CBC auto differential    Lipid panel     Comprehensive metabolic panel      Other Visit Diagnoses     Essential hypertension    -  Primary    Relevant Medications    lisinopril (PRINIVIL,ZESTRIL) 20 MG tablet    Other Relevant Orders    CBC auto differential    Comprehensive metabolic panel    Chronic left-sided low back pain without sciatica        Relevant Medications    meloxicam (MOBIC) 15 MG tablet    baclofen (LIORESAL) 10 MG tablet    Other Relevant Orders    X-Ray Lumbar Spine Complete 5 View    Ambulatory consult to Physical Therapy    Vitamin D deficiency        Relevant Orders    Vitamin D

## 2019-12-31 ENCOUNTER — CLINICAL SUPPORT (OUTPATIENT)
Dept: INTERNAL MEDICINE | Facility: CLINIC | Age: 49
End: 2019-12-31
Payer: MEDICAID

## 2019-12-31 VITALS — DIASTOLIC BLOOD PRESSURE: 88 MMHG | SYSTOLIC BLOOD PRESSURE: 136 MMHG

## 2019-12-31 DIAGNOSIS — M54.50 CHRONIC LEFT-SIDED LOW BACK PAIN WITHOUT SCIATICA: ICD-10-CM

## 2019-12-31 DIAGNOSIS — I10 ESSENTIAL HYPERTENSION: ICD-10-CM

## 2019-12-31 DIAGNOSIS — E11.65 TYPE 2 DIABETES MELLITUS WITH HYPERGLYCEMIA, WITHOUT LONG-TERM CURRENT USE OF INSULIN: ICD-10-CM

## 2019-12-31 DIAGNOSIS — G89.29 CHRONIC LEFT-SIDED LOW BACK PAIN WITHOUT SCIATICA: ICD-10-CM

## 2019-12-31 PROCEDURE — 99212 OFFICE O/P EST SF 10 MIN: CPT | Mod: PBBFAC

## 2019-12-31 PROCEDURE — 99999 PR PBB SHADOW E&M-EST. PATIENT-LVL II: CPT | Mod: PBBFAC,,,

## 2019-12-31 PROCEDURE — 99999 PR PBB SHADOW E&M-EST. PATIENT-LVL II: ICD-10-PCS | Mod: PBBFAC,,,

## 2019-12-31 RX ORDER — MELOXICAM 15 MG/1
15 TABLET ORAL DAILY
Qty: 30 TABLET | Refills: 0 | Status: SHIPPED | OUTPATIENT
Start: 2019-12-31

## 2019-12-31 RX ORDER — LISINOPRIL 20 MG/1
20 TABLET ORAL DAILY
Qty: 90 TABLET | Refills: 4 | Status: CANCELLED | OUTPATIENT
Start: 2019-12-31 | End: 2020-12-30

## 2019-12-31 RX ORDER — LISINOPRIL 20 MG/1
20 TABLET ORAL DAILY
Qty: 90 TABLET | Refills: 4 | Status: SHIPPED | OUTPATIENT
Start: 2019-12-31 | End: 2021-05-28

## 2019-12-31 RX ORDER — MELOXICAM 15 MG/1
15 TABLET ORAL DAILY
Qty: 30 TABLET | Refills: 0 | Status: CANCELLED | OUTPATIENT
Start: 2019-12-31

## 2019-12-31 NOTE — PROGRESS NOTES
Blood pressure reading was normal this am. Patient will resume medications and follow up with PCP as recommended.

## 2020-01-07 ENCOUNTER — PATIENT OUTREACH (OUTPATIENT)
Dept: ADMINISTRATIVE | Facility: HOSPITAL | Age: 50
End: 2020-01-07

## 2020-01-07 NOTE — PROGRESS NOTES
I have contacted patient to see if he had eye exam this year. Patient stated that eye exam was done at [facility] in [place].

## 2020-01-21 ENCOUNTER — OFFICE VISIT (OUTPATIENT)
Dept: ENDOCRINOLOGY | Facility: CLINIC | Age: 50
End: 2020-01-21
Payer: MEDICAID

## 2020-01-21 VITALS
TEMPERATURE: 99 F | SYSTOLIC BLOOD PRESSURE: 143 MMHG | DIASTOLIC BLOOD PRESSURE: 88 MMHG | BODY MASS INDEX: 38.55 KG/M2 | WEIGHT: 300.25 LBS | HEART RATE: 74 BPM

## 2020-01-21 DIAGNOSIS — E11.65 TYPE 2 DIABETES MELLITUS WITH HYPERGLYCEMIA, WITHOUT LONG-TERM CURRENT USE OF INSULIN: Primary | ICD-10-CM

## 2020-01-21 DIAGNOSIS — R73.09 ELEVATED HEMOGLOBIN A1C: ICD-10-CM

## 2020-01-21 DIAGNOSIS — I10 HYPERTENSION, UNSPECIFIED TYPE: ICD-10-CM

## 2020-01-21 PROCEDURE — 99999 PR PBB SHADOW E&M-EST. PATIENT-LVL III: CPT | Mod: PBBFAC,,, | Performed by: INTERNAL MEDICINE

## 2020-01-21 PROCEDURE — 99214 PR OFFICE/OUTPT VISIT, EST, LEVL IV, 30-39 MIN: ICD-10-PCS | Mod: S$PBB,,, | Performed by: INTERNAL MEDICINE

## 2020-01-21 PROCEDURE — 99213 OFFICE O/P EST LOW 20 MIN: CPT | Mod: PBBFAC | Performed by: INTERNAL MEDICINE

## 2020-01-21 PROCEDURE — 99999 PR PBB SHADOW E&M-EST. PATIENT-LVL III: ICD-10-PCS | Mod: PBBFAC,,, | Performed by: INTERNAL MEDICINE

## 2020-01-21 PROCEDURE — 99214 OFFICE O/P EST MOD 30 MIN: CPT | Mod: S$PBB,,, | Performed by: INTERNAL MEDICINE

## 2020-01-21 RX ORDER — GLIMEPIRIDE 4 MG/1
TABLET ORAL
Qty: 30 TABLET | Refills: 3 | Status: SHIPPED | OUTPATIENT
Start: 2020-01-21 | End: 2020-04-21 | Stop reason: SDUPTHER

## 2020-01-21 RX ORDER — METFORMIN HYDROCHLORIDE 1000 MG/1
TABLET ORAL
Qty: 60 TABLET | Refills: 4 | Status: SHIPPED | OUTPATIENT
Start: 2020-01-21 | End: 2020-04-21 | Stop reason: SDUPTHER

## 2020-01-21 NOTE — PATIENT INSTRUCTIONS
Tradjenta one tablet daily  To replace one Glyburide dose,  so keep taking Glyburide once a day.    Metformin twice a day.

## 2020-01-21 NOTE — PROGRESS NOTES
Self referred   Patient ID: Jean Claude Ocasio is a 49 y.o. male.    Chief Complaint:    HPI  DM diagnosed: 8 years  Off Januvia 50 mg  Metformin 1000 mg bid  Glimperide 4 mg BID  ---------------------------------  The following is a copy from last visit note;  PATIENT QUIT TAKING INSULIN INJECTIONS FEW DAYS AFTER LAST VISIT BECAUSE  HE WAS NOT FEELING WELL AND HE FELT LIKE A DIFFERENT PERSON, AND EVEN HIS  WIFE ASKED HIM WHAT WAS WRONG WITH HIM.  HIS BLOOD SUGAR PROBABLY WAS  IN 80S SOMETIMES WHEN HE WAS ON INSULIN.  IT IS NOT CLEAR IF HE HAD HYPOGLYCEMIA  HE DECIDED TO STOP THE INSULIN AND TO TAKE ONLY METFORMIN AND  HE STARTED TAKING GLYBURIDE 4 MG TWICE A DAY PER PATIENT  IT IS NOT CLEAR IF HE MEANS GLIMEPIRIDE INSTEAD OF GLYBURIDE  HIS BLOOD SUGAR RECENTLY HAS BEEN 100-170  NO CBG LOG AVAILABLE  --------------------------------  Frequency of CBGS 3- times  CBgs usually 200 post meals, and 104 your 160 prior to meals per patient.  Occasionally blood sugar is much lower ? How much  Kidney problem: no  Appt w ophth  Pain or numbness in feet: no  Taking blood pressure medication: occ, not daily  Patient is advised today to take his blood pressure medication daily  Taking cholesterol medication: not taking  Exercise daily    No complaints of  dysphagia, n/v, cp, sob, abd pain, rash, or edema.     No FH of pancreatitis, or thyroid cancer        Patient is a .  Social History     Socioeconomic History    Marital status:      Spouse name: Not on file    Number of children: Not on file    Years of education: Not on file    Highest education level: Not on file   Occupational History    Not on file   Social Needs    Financial resource strain: Not on file    Food insecurity:     Worry: Not on file     Inability: Not on file    Transportation needs:     Medical: Not on file     Non-medical: Not on file   Tobacco Use    Smoking status: Never Smoker   Substance and Sexual Activity    Alcohol use: Not  on file    Drug use: Not on file    Sexual activity: Not on file   Lifestyle    Physical activity:     Days per week: Not on file     Minutes per session: Not on file    Stress: Not on file   Relationships    Social connections:     Talks on phone: Not on file     Gets together: Not on file     Attends [employer] service: Not on file     Active member of club or organization: Not on file     Attends meetings of clubs or organizations: Not on file     Relationship status: Not on file   Other Topics Concern    Not on file   Social History Narrative    Not on file       ROS:   + Diabetes   No complaints of chest pain shortness breath  No complaints of nausea or vomiting  CHRONIC PAIN IN LEFT HEEL improved a lot  Chronic lower back pain    PE:  Vitals:    01/21/20 0937   BP: (!) 143/88   Pulse: 74   Temp: 98.7 °F (37.1 °C)     Alert and oriented  No acute distress  + Obesity  Diffuse vitiligo in different areas of the body  Body mass index is 38.55 kg/m².  No Proptosis or conjunctivitis  No goitre by inspection  Thyroid gland is not palpable  Heart reg, no gallop  Lungs cta, no wheezing  No edema in lower legs  Speech normal  Behavior normal  No tremor      Lab Reviewed.    A/P  Type 2 diabetes mellitus with hyperglycemia, without long-term current use of insulin  Uncontrolled diabetes  Elevated A1c  HTN  Obesity (BMI 30-39.9)  OFF INSULIN SEC TO SIDE EFFECTS  Off Trulicity injection       Tradjenta one tablet daily  To replace one Glimperide dose,  So to keep taking Glimperide once a day.  Metformin twice a day.  Hypoglycemia side effect from glimepiride discussed.     Pain in left heel was likely secondary to Achilles tendonitis    Chronic lower back pain    Orders Placed This Encounter   Procedures    Microalbumin/creatinine urine ratio     Standing Status:   Future     Standing Expiration Date:   3/21/2021     Order Specific Question:   Specimen Source     Answer:   Urine         Follow-up visit in 3  months.      Patient understands and agrees on the plan.

## 2020-04-19 ENCOUNTER — PATIENT OUTREACH (OUTPATIENT)
Dept: ADMINISTRATIVE | Facility: OTHER | Age: 50
End: 2020-04-19

## 2020-04-21 ENCOUNTER — TELEPHONE (OUTPATIENT)
Dept: ENDOCRINOLOGY | Facility: CLINIC | Age: 50
End: 2020-04-21

## 2020-04-21 ENCOUNTER — OFFICE VISIT (OUTPATIENT)
Dept: ENDOCRINOLOGY | Facility: CLINIC | Age: 50
End: 2020-04-21
Payer: MEDICAID

## 2020-04-21 DIAGNOSIS — E78.5 DYSLIPIDEMIA: ICD-10-CM

## 2020-04-21 DIAGNOSIS — E11.65 UNCONTROLLED TYPE 2 DIABETES MELLITUS WITH HYPERGLYCEMIA: Primary | ICD-10-CM

## 2020-04-21 PROCEDURE — 99214 PR OFFICE/OUTPT VISIT, EST, LEVL IV, 30-39 MIN: ICD-10-PCS | Mod: 95,,, | Performed by: INTERNAL MEDICINE

## 2020-04-21 PROCEDURE — 99214 OFFICE O/P EST MOD 30 MIN: CPT | Mod: 95,,, | Performed by: INTERNAL MEDICINE

## 2020-04-21 RX ORDER — GLIMEPIRIDE 4 MG/1
TABLET ORAL
Qty: 60 TABLET | Refills: 2 | Status: SHIPPED | OUTPATIENT
Start: 2020-04-21 | End: 2020-08-06

## 2020-04-21 RX ORDER — METFORMIN HYDROCHLORIDE 1000 MG/1
TABLET ORAL
Qty: 60 TABLET | Refills: 2 | Status: SHIPPED | OUTPATIENT
Start: 2020-04-21 | End: 2020-08-13

## 2020-04-21 NOTE — TELEPHONE ENCOUNTER
Called pt to ask if [facility] Pt stated he did not have a problem with logging on he just did not see anyone logged on . I asked the pt to log back on so that dr. pack can start his visit.     ----- Message from Chanelle Willoughby sent at 4/21/2020  4:21 PM CDT -----  Type:  Same Day Appointment Request    Caller is requesting a same day appointment.  Caller declined first available appointment listed below.    Name of Caller: Tereso Silverio   When is the first available appointment?   Symptoms:  Best Call Back Number:   [phone]  Additional Information:  Pt states he had a virtual appt this afternoon at 4:00pm today//wanted to let you know he is waiting//please call//thanks/laya

## 2020-04-21 NOTE — PROGRESS NOTES
The patient location is:  Patient Home   The chief complaint leading to consultation is:  Follow-up on diabetes  Visit type: Virtual visit with synchronous audio and video  Total time spent with patient: 28 min      Self referred   Patient ID: Jean Claude Ocasio is a 49 y.o. male.    Chief Complaint:    Follow-up       DM diagnosed: 8 years  Off Januvia 50 mg  On Tradjenta daily  Metformin 1000 mg bid  Glimperide 4 mg BID  Am CBG usually 140, then 200 at lunch then maybe 130 in the evening  ---------------------------------  The following is a copy from last visit note;  PATIENT QUIT TAKING INSULIN INJECTIONS FEW DAYS AFTER LAST VISIT BECAUSE  HE WAS NOT FEELING WELL AND HE FELT LIKE A DIFFERENT PERSON, AND EVEN HIS  WIFE ASKED HIM WHAT WAS WRONG WITH HIM.  HIS BLOOD SUGAR PROBABLY WAS  IN 80S SOMETIMES WHEN HE WAS ON INSULIN.  IT IS NOT CLEAR IF HE HAD HYPOGLYCEMIA  HE DECIDED TO STOP THE INSULIN AND TO TAKE ONLY METFORMIN AND  HE STARTED TAKING GLYBURIDE 4 MG TWICE A DAY PER PATIENT  IT IS NOT CLEAR IF HE MEANS GLIMEPIRIDE INSTEAD OF GLYBURIDE  HIS BLOOD SUGAR RECENTLY HAS BEEN 100-170  NO CBG LOG AVAILABLE  --------------------------------  DM x 7-8 yeras  Kidney problem: no  Appt w ophth  Pain or numbness in feet: no  Taking blood pressure medication  Taking cholesterol medication: not daily  Trying to avoid meat and milk   Exercise daily, walking for about 10 min twice a day    No complaints of  dysphagia, n/v, cp, sob, abd pain, rash, or edema.     No FH of pancreatitis, or medullary thyroid cancer        Patient is a .    ROS:   + Diabetes   No complaints of chest pain shortness breath  No complaints of nausea or vomiting    PE:  Alert and oriented  No acute distress      Lab Reviewed.    A/P  Type 2 diabetes mellitus with hyperglycemia, without long-term current use of insulin  Uncontrolled diabetes  Elevated A1c  HTN  Obesity (BMI 30-39.9)  OFF INSULIN SEC TO SIDE EFFECTS  Off Trulicity injection    Trulicity inj will be prescribed today  Main side effects discussed  Contraindications discussed  There is no history of pancreatitis or family history of pancreatitis      Tradjenta one tablet daily  Glimperide twice a day  Metformin twice a day.  Hypoglycemia side effect from glimepiride discussed.  In Ramadan, to cut down glimepiride dose to half tablet daily,  and to take only 1 tablet instead of taking 2 tablets the day before starting fasting.  To break fasting if CBG is about 75    Dyslipidemia  On statin  Patient is not taking statin daily, and he tries to avoid eating meat.     Pain in left heel was likely secondary to Achilles tendonitis    Chronic lower back pain    Medications Ordered This Encounter   Medications    blood sugar diagnostic Strp     Sig: tid     Dispense:  100 strip     Refill:  2    dulaglutide (TRULICITY) 0.75 mg/0.5 mL PnIj     Sig: Inject 0.5 mLs (0.75 mg total) into the skin every 7 days. Once a week sq inj in abdomen     Dispense:  4 Syringe     Refill:  1    glimepiride (AMARYL) 4 MG tablet     Sig: bid     Dispense:  60 tablet     Refill:  2    metFORMIN (GLUCOPHAGE) 1000 MG tablet     Sig: bid     Dispense:  60 tablet     Refill:  2     Orders Placed This Encounter   Procedures    Hemoglobin A1c     Standing Status:   Future     Standing Expiration Date:   10/21/2020    Comprehensive metabolic panel     Standing Status:   Future     Standing Expiration Date:   6/21/2021     Blood test to be done in May.    Follow-up visit       Patient understands and agrees on the plan.

## 2020-05-05 ENCOUNTER — DOCUMENTATION ONLY (OUTPATIENT)
Dept: ENDOCRINOLOGY | Facility: CLINIC | Age: 50
End: 2020-05-05

## 2020-05-05 NOTE — PROGRESS NOTES
Victoza script will replace Trulicity script sec to Insurance not covering Trulicity as the preferred med.

## 2020-05-13 ENCOUNTER — TELEPHONE (OUTPATIENT)
Dept: ENDOCRINOLOGY | Facility: CLINIC | Age: 50
End: 2020-05-13

## 2020-05-20 ENCOUNTER — LAB VISIT (OUTPATIENT)
Dept: LAB | Facility: HOSPITAL | Age: 50
End: 2020-05-20
Attending: INTERNAL MEDICINE
Payer: MEDICAID

## 2020-05-20 DIAGNOSIS — E11.65 UNCONTROLLED TYPE 2 DIABETES MELLITUS WITH HYPERGLYCEMIA: ICD-10-CM

## 2020-05-20 LAB
ALBUMIN SERPL BCP-MCNC: 4 G/DL (ref 3.5–5.2)
ALP SERPL-CCNC: 67 U/L (ref 55–135)
ALT SERPL W/O P-5'-P-CCNC: 42 U/L (ref 10–44)
ANION GAP SERPL CALC-SCNC: 12 MMOL/L (ref 8–16)
AST SERPL-CCNC: 33 U/L (ref 10–40)
BILIRUB SERPL-MCNC: 0.3 MG/DL (ref 0.1–1)
BUN SERPL-MCNC: 13 MG/DL (ref 6–20)
CALCIUM SERPL-MCNC: 9.2 MG/DL (ref 8.7–10.5)
CHLORIDE SERPL-SCNC: 103 MMOL/L (ref 95–110)
CO2 SERPL-SCNC: 22 MMOL/L (ref 23–29)
CREAT SERPL-MCNC: 0.8 MG/DL (ref 0.5–1.4)
EST. GFR  (AFRICAN AMERICAN): >60 ML/MIN/1.73 M^2
EST. GFR  (NON AFRICAN AMERICAN): >60 ML/MIN/1.73 M^2
ESTIMATED AVG GLUCOSE: 163 MG/DL (ref 68–131)
GLUCOSE SERPL-MCNC: 163 MG/DL (ref 70–110)
HBA1C MFR BLD HPLC: 7.3 % (ref 4–5.6)
POTASSIUM SERPL-SCNC: 4.4 MMOL/L (ref 3.5–5.1)
PROT SERPL-MCNC: 7.6 G/DL (ref 6–8.4)
SODIUM SERPL-SCNC: 137 MMOL/L (ref 136–145)

## 2020-05-20 PROCEDURE — 83036 HEMOGLOBIN GLYCOSYLATED A1C: CPT

## 2020-05-20 PROCEDURE — 80053 COMPREHEN METABOLIC PANEL: CPT

## 2020-05-20 PROCEDURE — 36415 COLL VENOUS BLD VENIPUNCTURE: CPT

## 2020-06-19 ENCOUNTER — PATIENT OUTREACH (OUTPATIENT)
Dept: ADMINISTRATIVE | Facility: OTHER | Age: 50
End: 2020-06-19

## 2020-06-22 ENCOUNTER — TELEPHONE (OUTPATIENT)
Dept: ENDOCRINOLOGY | Facility: CLINIC | Age: 50
End: 2020-06-22

## 2020-06-22 ENCOUNTER — OFFICE VISIT (OUTPATIENT)
Dept: ENDOCRINOLOGY | Facility: CLINIC | Age: 50
End: 2020-06-22
Payer: MEDICAID

## 2020-06-22 DIAGNOSIS — E11.65 UNCONTROLLED TYPE 2 DIABETES MELLITUS WITH HYPERGLYCEMIA: Primary | ICD-10-CM

## 2020-06-22 DIAGNOSIS — R73.09 ELEVATED HEMOGLOBIN A1C: ICD-10-CM

## 2020-06-22 DIAGNOSIS — I10 HYPERTENSION, UNSPECIFIED TYPE: ICD-10-CM

## 2020-06-22 PROCEDURE — 99214 PR OFFICE/OUTPT VISIT, EST, LEVL IV, 30-39 MIN: ICD-10-PCS | Mod: 95,,, | Performed by: INTERNAL MEDICINE

## 2020-06-22 PROCEDURE — 99214 OFFICE O/P EST MOD 30 MIN: CPT | Mod: 95,,, | Performed by: INTERNAL MEDICINE

## 2020-06-22 NOTE — TELEPHONE ENCOUNTER
Called pt to ask him to log back on at 11:05am because Dr. Bob is running late.     ----- Message from Berkley Crenshaw sent at 6/22/2020 10:45 AM CDT -----  Contact: pt  Pt is calling to inform that he is logged on and awaiting the Dr to sign on for his appt and can be reached at [phone]//thanks/dbw

## 2020-06-22 NOTE — PROGRESS NOTES
The patient location is:  Patient Home   The chief complaint leading to consultation is:  Workplace  Visit type: Virtual visit with synchronous audio and video  Total time spent with patient:  About 15 min      Patient was managed by me for his diabetes.  He is feeling fine.  His blood sugar in the morning usually 130-140, and then it goes up to 200 after drinking coffee.  No hypoglycemia occurred recently.  Patient has been on 3 oral medications for diabetes,  Metformin twice a day, glimepiride 4 mg twice a day, and Tradjenta.  He quit using Victoza  about 1 month ago with no clear reason.  He has been feeling sometimes his stomach is full.  No complaints of nausea, vomiting, chest pain, shortness of breath, edema, or rash.  + weight gain.    Past medical history  Type 2 diabetes  Hypertension  Dyslipidemia    Review of system  As above    Physical exam  Alert and oriented  No acute distress    Assessment and plan  Uncontrolled type 2 diabetes with hyperglycemia  Elevated A1c  Hypertension    Patient continue taking the oral medications for diabetes.  He is advised to restart using Victoza inj 0.75 mg daily  He can contact me for problems with his blood sugar  Follow-up visit in 3 months

## 2020-07-17 ENCOUNTER — TELEPHONE (OUTPATIENT)
Dept: INTERNAL MEDICINE | Facility: CLINIC | Age: 50
End: 2020-07-17

## 2020-07-17 NOTE — TELEPHONE ENCOUNTER
----- Message from Linnette Sanchez sent at 7/16/2020  7:23 PM CDT -----  Regarding: Patient portal  Patient Message:  Patient would like to schedule an appointment for back pain . No appointments available to schedule     Preferred Date Range: 7/20/2020 - 7/26/2020    Preferred Times: Any Time    Reason for visit: Back  pain

## 2020-07-17 NOTE — TELEPHONE ENCOUNTER
Informed pt that Dr. Barrientos is no longer doing internal medicine and that he would need to establish care with another provider. Also advised pt to go to urgent care for back pain. He verbalized understanding.

## 2020-07-18 ENCOUNTER — OFFICE VISIT (OUTPATIENT)
Dept: URGENT CARE | Facility: CLINIC | Age: 50
End: 2020-07-18
Payer: MEDICAID

## 2020-07-18 VITALS
OXYGEN SATURATION: 100 % | SYSTOLIC BLOOD PRESSURE: 166 MMHG | HEIGHT: 74 IN | TEMPERATURE: 98 F | BODY MASS INDEX: 37.22 KG/M2 | RESPIRATION RATE: 18 BRPM | DIASTOLIC BLOOD PRESSURE: 98 MMHG | HEART RATE: 97 BPM | WEIGHT: 290 LBS

## 2020-07-18 DIAGNOSIS — M54.41 ACUTE BILATERAL LOW BACK PAIN WITH RIGHT-SIDED SCIATICA: Primary | ICD-10-CM

## 2020-07-18 PROCEDURE — 99214 PR OFFICE/OUTPT VISIT, EST, LEVL IV, 30-39 MIN: ICD-10-PCS | Mod: S$GLB,,, | Performed by: PHYSICIAN ASSISTANT

## 2020-07-18 PROCEDURE — 99214 OFFICE O/P EST MOD 30 MIN: CPT | Mod: S$GLB,,, | Performed by: PHYSICIAN ASSISTANT

## 2020-07-18 RX ORDER — IBUPROFEN 800 MG/1
800 TABLET ORAL EVERY 8 HOURS PRN
Qty: 30 TABLET | Refills: 0 | Status: SHIPPED | OUTPATIENT
Start: 2020-07-18 | End: 2020-07-28

## 2020-07-18 RX ORDER — CYCLOBENZAPRINE HCL 10 MG
10 TABLET ORAL 3 TIMES DAILY PRN
Qty: 21 TABLET | Refills: 0 | Status: SHIPPED | OUTPATIENT
Start: 2020-07-18 | End: 2020-07-25

## 2020-07-18 NOTE — PROGRESS NOTES
"Subjective:       Patient ID: Jean Claude Ocasio is a 50 y.o. male.    Vitals:  height is 6' 2" (1.88 m) and weight is 131.5 kg (290 lb). His tympanic temperature is 98.2 °F (36.8 °C). His blood pressure is 166/98 (abnormal) and his pulse is 97. His respiration is 18 and oxygen saturation is 100%.     Chief Complaint: Back Pain    Pt presents to clinic with lower back pain for three days.  He has he was lifting 2 cases of water bottles when the pain started.  Pain radiates into his right thigh.  He also reports intermittent numbness that lasts a few minutes on his right thigh.  Patient states that he has had similar issues in the past.  He was unable to get appointment with his PCP.  He has not taken any medication for his symptoms.  He denies any weakness, bowel or bladder incontinence, gait instability.    Back Pain  This is a new problem. The current episode started in the past 7 days. The problem occurs constantly. The pain is present in the lumbar spine. The pain radiates to the right thigh. The pain is at a severity of 8/10. Pertinent negatives include no abdominal pain, bladder incontinence, bowel incontinence, dysuria or numbness.       Constitution: Negative for fatigue.   Gastrointestinal: Negative for abdominal pain and bowel incontinence.   Genitourinary: Negative for dysuria, urgency, bladder incontinence and hematuria.   Musculoskeletal: Positive for back pain. Negative for muscle cramps and history of spine disorder.   Skin: Negative for rash.   Neurological: Negative for coordination disturbances, numbness and tingling.       Objective:      Physical Exam   Constitutional: He is oriented to person, place, and time. He appears well-developed. He is cooperative. No distress.   HENT:   Head: Normocephalic and atraumatic.   Nose: Nose normal.   Mouth/Throat: Oropharynx is clear and moist and mucous membranes are normal.   Eyes: Conjunctivae and lids are normal.   Neck: Trachea normal, normal range of " motion, full passive range of motion without pain and phonation normal. Neck supple.   Cardiovascular: Normal rate, regular rhythm, normal heart sounds and normal pulses.   Pulmonary/Chest: Effort normal and breath sounds normal.   Abdominal: Soft. Normal appearance and bowel sounds are normal. He exhibits no abdominal bruit, no pulsatile midline mass and no mass.   Musculoskeletal:         General: No deformity.      Lumbar back: He exhibits decreased range of motion (flexion/extension pain limited), tenderness and pain. He exhibits no bony tenderness.        Back:       Comments: SLR + on right   Neurological: He is alert and oriented to person, place, and time. He has normal motor skills, normal sensation, normal strength and normal reflexes. He displays no weakness. No sensory deficit. Gait normal.   Skin: Skin is warm, dry, intact and not diaphoretic. Psychiatric: His speech is normal and behavior is normal. Judgment and thought content normal.   Nursing note and vitals reviewed.        Assessment:       1. Acute bilateral low back pain with right-sided sciatica        Plan:       Baclofen listed on med list but pt states he is not taking this medication. Pt instructed to not take Baclofen while taking Flexeril and he voiced understanding. Recommend heating pad to area to help with pain. F/u with PCP if symptoms fail to improve.     Acute bilateral low back pain with right-sided sciatica  -     cyclobenzaprine (FLEXERIL) 10 MG tablet; Take 1 tablet (10 mg total) by mouth 3 (three) times daily as needed for Muscle spasms.  Dispense: 21 tablet; Refill: 0  -     ibuprofen (ADVIL,MOTRIN) 800 MG tablet; Take 1 tablet (800 mg total) by mouth every 8 (eight) hours as needed for Pain.  Dispense: 30 tablet; Refill: 0      Patient Instructions     Sciatica    Sciatica is a condition that causes pain in the lower back that spreads down into the buttock, hip, and leg. Sometimes the leg pain can happen without any back  pain. Sciatica happens when a spinal nerve is irritated or has pressure put on it as comes out of the spinal canal in the lower back. This most often happens when a bulge or rupture of a nearby spinal disk presses on the nerve. Sciatica can also be caused by a narrowing of the spinal canal (spinal stenosis) or spasm of the muscle in the buttocks that the sciatic nerve passes through (pyriform muscle). Sciatica is also called lumbar radiculopathy.  Sciatica may begin after a sudden twisting or bending force, such as in a car accident. Or it can happen after a simple awkward movement. In either case, muscle spasm often also happens. Muscle spasm makes the pain worse.  A healthcare provider makes a diagnosis of sciatica from your symptoms and a physical exam. Unless you had an injury from a car accident or fall, you usually wont have X-rays taken at this time. This is because the nerves and disks in your back cant be seen on an X-ray. If the provider sees signs of a compressed nerve, you will need to schedule an MRI scan as an outpatient. Signs of a compressed nerve include loss of strength in a leg.  Most sciatica gets better with medicine, exercise, and physical therapy. If your symptoms continue after at least 3 months of medical treatment, you may need surgery or injections to your lower back.  Home care  Follow these tips when caring for yourself at home:  · You may need to stay in bed the first few days. But as soon as possible, begin sitting up or walking. This will help you avoid problems that come from staying in bed for long periods.  · When in bed, try to find a position that is comfortable. A firm mattress is best. Try lying flat on your back with pillows under your knees. You can also try lying on your side with your knees bent up toward your chest and a pillow between your knees.  · Avoid sitting for long periods. This puts more stress on your lower back than standing or walking.  · Use heat from a hot  shower, hot bath, or heating pad to help ease pain. Massage can also help. You can also try using an ice pack. You can make your own ice pack by putting ice cubes in a plastic bag. Wrap the bag in a thin towel. Try both heat and cold to see which works best. Use the method that feels best for 20 minutes several times a day.  · You may use acetaminophen or ibuprofen to ease pain, unless another pain medicine was prescribed. Note: If you have chronic liver or kidney disease, talk with your healthcare provider before taking these medicines. Also talk with your provider if youve had a stomach ulcer or gastrointestinal bleeding.  · Use safe lifting methods. Dont lift anything heavier than 15 pounds until all of the pain is gone.  Follow-up care  Follow up with your healthcare provider, or as advised. You may need physical therapy or additional tests.  If X-rays were taken, a radiologist will look at them. You will be told of any new findings that may affect your care.  When to seek medical advice  Call your healthcare provider right away if any of these occur:  · Pain gets worse even after taking prescribed medicine  · Weakness or numbness in 1 or both legs or hips  · Numbness in your groin or genital area  · You cant control your bowel or bladder  · Fever  · Redness or swelling over your back or spine   Date Last Reviewed: 8/1/2016  © 4516-6192 [employer]. [address] [place] [address], [place] [address]. All rights reserved. This information is not intended as a substitute for professional medical care. Always follow your healthcare professional's instructions.

## 2020-07-18 NOTE — PATIENT INSTRUCTIONS

## 2020-07-21 ENCOUNTER — TELEPHONE (OUTPATIENT)
Dept: URGENT CARE | Facility: CLINIC | Age: 50
End: 2020-07-21

## 2020-07-21 NOTE — TELEPHONE ENCOUNTER
Pt states he is still having pain, but a little better. Advised pt to call if we could do anything for him.

## 2020-07-27 ENCOUNTER — OFFICE VISIT (OUTPATIENT)
Dept: INTERNAL MEDICINE | Facility: CLINIC | Age: 50
End: 2020-07-27
Payer: MEDICAID

## 2020-07-27 VITALS
HEART RATE: 93 BPM | TEMPERATURE: 99 F | OXYGEN SATURATION: 97 % | HEIGHT: 74 IN | SYSTOLIC BLOOD PRESSURE: 144 MMHG | DIASTOLIC BLOOD PRESSURE: 88 MMHG | WEIGHT: 299.38 LBS | BODY MASS INDEX: 38.42 KG/M2

## 2020-07-27 DIAGNOSIS — M54.50 CHRONIC BILATERAL LOW BACK PAIN WITHOUT SCIATICA: ICD-10-CM

## 2020-07-27 DIAGNOSIS — R20.2 BILATERAL NUMBNESS AND TINGLING OF ARMS AND LEGS: Chronic | ICD-10-CM

## 2020-07-27 DIAGNOSIS — G89.29 CHRONIC BILATERAL LOW BACK PAIN WITHOUT SCIATICA: ICD-10-CM

## 2020-07-27 DIAGNOSIS — M47.816 SPONDYLOSIS OF LUMBAR REGION WITHOUT MYELOPATHY OR RADICULOPATHY: ICD-10-CM

## 2020-07-27 DIAGNOSIS — G47.33 OBSTRUCTIVE SLEEP APNEA SYNDROME: ICD-10-CM

## 2020-07-27 DIAGNOSIS — R20.0 BILATERAL NUMBNESS AND TINGLING OF ARMS AND LEGS: Chronic | ICD-10-CM

## 2020-07-27 DIAGNOSIS — E11.42 TYPE 2 DIABETES MELLITUS WITH DIABETIC POLYNEUROPATHY, WITHOUT LONG-TERM CURRENT USE OF INSULIN: Primary | Chronic | ICD-10-CM

## 2020-07-27 PROCEDURE — 99214 PR OFFICE/OUTPT VISIT, EST, LEVL IV, 30-39 MIN: ICD-10-PCS | Mod: S$PBB,,, | Performed by: FAMILY MEDICINE

## 2020-07-27 PROCEDURE — 99999 PR PBB SHADOW E&M-EST. PATIENT-LVL V: ICD-10-PCS | Mod: PBBFAC,,, | Performed by: FAMILY MEDICINE

## 2020-07-27 PROCEDURE — 99215 OFFICE O/P EST HI 40 MIN: CPT | Mod: PBBFAC | Performed by: FAMILY MEDICINE

## 2020-07-27 PROCEDURE — 99999 PR PBB SHADOW E&M-EST. PATIENT-LVL V: CPT | Mod: PBBFAC,,, | Performed by: FAMILY MEDICINE

## 2020-07-27 PROCEDURE — 99214 OFFICE O/P EST MOD 30 MIN: CPT | Mod: S$PBB,,, | Performed by: FAMILY MEDICINE

## 2020-07-27 NOTE — ASSESSMENT & PLAN NOTE
Diabetes Management Status    Statin: Taking  ACE/ARB: Taking    Screening or Prevention Patient's value Goal Complete/Controlled?   HgA1C Testing and Control   Lab Results   Component Value Date    HGBA1C 7.3 (H) 05/20/2020      Annually/Less than 8% Yes   Lipid profile : 12/17/2019 Annually Yes   LDL control Lab Results   Component Value Date    LDLCALC 96.2 12/17/2019    Annually/Less than 100 mg/dl  Yes   Nephropathy screening Lab Results   Component Value Date    LABMICR 35.0 01/21/2020     No results found for: PROTEINUA Annually Yes   Blood pressure BP Readings from Last 1 Encounters:   07/27/20 (!) 144/88    Less than 140/90 No   Dilated retinal exam : 02/01/2020 Annually Yes   Foot exam   : 07/27/2020 Annually Yes     Lab Results   Component Value Date    HGBA1C 7.3 (H) 05/20/2020    HGBA1C 7.7 (H) 10/24/2019    ESTGFRAFRICA >60.0 05/20/2020    EGFRNONAA >60.0 05/20/2020    MICALBCREAT 21.6 01/21/2020    LDLCALC 96.2 12/17/2019     Last 6 Patient Entered Readings                                          Most Recent A1c:      There is no flowsheet data to display.           HEALTH MAINTENANCE: Diabetic health maintenance interventions reviewed and are up to date except for:  There are no preventive care reminders to display for this patient.    Fairly but Sub-optimally controlled, improved.    DIABETIC FOOT EXAM: Inspection of feet reveals no open wounds, ulcerations, significant calluses, or evidence of arterial insufficiency. 10g monofilament sensation is ABSENT in 3 of 5 locations tested in each foot.

## 2020-07-27 NOTE — PROGRESS NOTES
CHIEF COMPLAINT  Establish Care    This is my first time treating him here. All problems addressed today are NEW TO ME.  Jean Claude is requesting that I take over as his primary care provider.     DIAGNOSES, HISTORY, ASSESSMENT, AND PLAN  Assessment   1. Type 2 diabetes mellitus with diabetic polyneuropathy, without long-term current use of insulin    2. Spondylosis of lumbar region without myelopathy or radiculopathy    3. Chronic bilateral low back pain without sciatica    4. Bilateral numbness and tingling of arms and legs    5. Obstructive sleep apnea syndrome         Orders Placed This Encounter    Ambulatory referral/consult to Physical/Occupational Therapy    EMG W/ ULTRASOUND AND NERVE CONDUCTION TEST 4 Extremities    Home Sleep Studies       Plan   Problem List Items Addressed This Visit     Bilateral numbness and tingling of arms and legs (Chronic)    Current Assessment & Plan     Chronic, recurrent, no loss of strength.         Relevant Orders    EMG W/ ULTRASOUND AND NERVE CONDUCTION TEST 4 Extremities    Type 2 diabetes mellitus with diabetic polyneuropathy, without long-term current use of insulin - Primary (Chronic)    Current Assessment & Plan     Diabetes Management Status    Statin: Taking  ACE/ARB: Taking    Screening or Prevention Patient's value Goal Complete/Controlled?   HgA1C Testing and Control   Lab Results   Component Value Date    HGBA1C 7.3 (H) 05/20/2020      Annually/Less than 8% Yes   Lipid profile : 12/17/2019 Annually Yes   LDL control Lab Results   Component Value Date    LDLCALC 96.2 12/17/2019    Annually/Less than 100 mg/dl  Yes   Nephropathy screening Lab Results   Component Value Date    LABMICR 35.0 01/21/2020     No results found for: PROTEINUA Annually Yes   Blood pressure BP Readings from Last 1 Encounters:   07/27/20 (!) 144/88    Less than 140/90 No   Dilated retinal exam : 02/01/2020 Annually Yes   Foot exam   : 07/27/2020 Annually Yes     Lab Results   Component Value  Date    HGBA1C 7.3 (H) 05/20/2020    HGBA1C 7.7 (H) 10/24/2019    ESTGFRAFRICA >60.0 05/20/2020    EGFRNONAA >60.0 05/20/2020    MICALBCREAT 21.6 01/21/2020    LDLCALC 96.2 12/17/2019     Last 6 Patient Entered Readings                                          Most Recent A1c:      There is no flowsheet data to display.           HEALTH MAINTENANCE: Diabetic health maintenance interventions reviewed and are up to date except for:  There are no preventive care reminders to display for this patient.    Fairly but Sub-optimally controlled, improved.    DIABETIC FOOT EXAM: Inspection of feet reveals no open wounds, ulcerations, significant calluses, or evidence of arterial insufficiency. 10g monofilament sensation is ABSENT in 3 of 5 locations tested in each foot.         Relevant Orders    EMG W/ ULTRASOUND AND NERVE CONDUCTION TEST 4 Extremities    Chronic bilateral low back pain without sciatica    Overview     X-ray Lumbar Spine December, 2019: Dextroscoliosis of the lumbar spine is noted.  There is minimal grade 1 retrolisthesis of L2 on L3 and L3 on L4.  No fracture is seen.  Degenerative changes are noted with spurring of the endplates and lower lumbar facet arthropathy.  Mild disc space narrowing at L2-L3.         Relevant Orders    Ambulatory referral/consult to Physical/Occupational Therapy    Obstructive sleep apnea syndrome    Current Assessment & Plan     STOP-BANG questionnaire to assess risk for obstructive sleep apnea (SOPHIE)  · Snoring: Do you snore loudly? ANSWER: YES  · Tired: Do you often feel tired, fatigued, or sleepy during daytime? ANSWER: YES Ostrander Sleepiness Scale Score = 8 (HIGHER NORMAL Daytime Sleepiness)  · Observed: Has anyone observed you stop breathing during your sleep? ANSWER: NO  · Pressure: Do you have or are you being treated for high blood pressure? ANSWER: YES  · BMI: BMI more than 35 kg/m2? ANSWER: YES (BMI = Body mass index is 38.44 kg/m².)   · Age: Age over 50 yr old? ANSWER:  "NO (Age = 50 y.o.)  · Neck circumference: Neck circumference greater than 40 cm? ANSWER: YES (Neck circumference = 46 cm, Mallampati class 3 oropharynx.  · Gender: Gender male? ANSWER: YES (Gender = male)    STOP-BANG Score = 6 (HIGH risk of SOPHIE)    INSTRUCTIONS PROVIDED: Do not drive or perform hazardous activities while drowsy.          Relevant Orders    Home Sleep Studies    Spondylosis of lumbar region without myelopathy or radiculopathy    Relevant Orders    Ambulatory referral/consult to Physical/Occupational Therapy          Outpatient Medications Prior to Visit   Medication Sig Dispense Refill    aspirin (ADULT LOW DOSE ASPIRIN) 81 MG EC tablet 1 tablet(s) by mouth daily      baclofen (LIORESAL) 10 MG tablet Take 1 tablet (10 mg total) by mouth 3 (three) times daily as needed. 90 tablet 11    blood sugar diagnostic Strp tid 100 strip 2    blood-glucose meter kit Use as instructed 1 each 0    ibuprofen (ADVIL,MOTRIN) 800 MG tablet Take 1 tablet (800 mg total) by mouth every 8 (eight) hours as needed for Pain. 30 tablet 0    lancets 30 gauge Misc qid 150 each 5    linaGLIPtin (TRADJENTA) 5 mg Tab tablet Take 1 tablet (5 mg total) by mouth once daily. 30 tablet 5    liraglutide 0.6 mg/0.1 mL, 18 mg/3 mL, subq PNIJ (VICTOZA 2-PAUL) 0.6 mg/0.1 mL (18 mg/3 mL) PnIj Inject 0.6 mg into the skin once daily. 3 mL 1    lisinopril (PRINIVIL,ZESTRIL) 20 MG tablet Take 1 tablet (20 mg total) by mouth once daily. 90 tablet 4    loratadine (CLARITIN) 10 mg tablet 1 tablet      pen needle, diabetic 31 gauge x 5/16" Ndle Use as directed 100 each 2    simvastatin (ZOCOR) 20 MG tablet 1 tablet(s) by mouth daily      glimepiride (AMARYL) 4 MG tablet bid 60 tablet 2    metFORMIN (GLUCOPHAGE) 1000 MG tablet bid 60 tablet 2    meloxicam (MOBIC) 15 MG tablet Take 1 tablet (15 mg total) by mouth once daily. (Patient not taking: Reported on 7/27/2020) 30 tablet 0     No facility-administered medications prior to visit.  " "       There are no discontinued medications.          Follow up in about 3 weeks (around 8/17/2020) for review test results, discuss treatment plan, re-evaluate problem(s) discussed today.     Subjective   Review of Systems   Constitutional: Negative for chills, diaphoresis and fever.   HENT: Negative for ear pain and trouble swallowing.    Eyes: Negative for pain and visual disturbance.   Respiratory: Negative for chest tightness and shortness of breath.    Gastrointestinal: Negative for abdominal pain, blood in stool, change in bowel habit, fecal incontinence and change in bowel habit.   Endocrine: Negative for polydipsia and polyuria.   Genitourinary: Negative.  Negative for bladder incontinence, difficulty urinating, dysuria and hematuria.   Musculoskeletal: Negative for gait problem and joint swelling.   Integumentary:  Negative for rash and wound.   Neurological: Negative for vertigo and syncope.        No saddle anesthesia or loss of lower extremity strength or sensation.   Hematological: Negative.    Psychiatric/Behavioral: Negative for confusion. The patient is not hyperactive.         Objective   Vitals:    07/27/20 1422   BP: (!) 144/88   BP Location: Right arm   Patient Position: Sitting   BP Method: Large (Manual)   Pulse: 93   Temp: 98.6 °F (37 °C)   TempSrc: Tympanic   SpO2: 97%   Weight: 135.8 kg (299 lb 6.2 oz)   Height: 6' 2" (1.88 m)     Physical Exam  Vitals signs reviewed.   Constitutional:       General: He is not in acute distress.     Appearance: Normal appearance. He is not ill-appearing, toxic-appearing or diaphoretic.   HENT:      Head: Normocephalic and atraumatic.   Eyes:      General: No scleral icterus.     Conjunctiva/sclera: Conjunctivae normal.   Neck:      Vascular: No carotid bruit.   Cardiovascular:      Rate and Rhythm: Normal rate and regular rhythm.      Heart sounds: Normal heart sounds.   Pulmonary:      Effort: Pulmonary effort is normal.      Breath sounds: Normal breath " "sounds.   Abdominal:      Palpations: Abdomen is soft.      Tenderness: There is no abdominal tenderness.   Musculoskeletal:         General: No tenderness.   Skin:     General: Skin is warm and dry.   Neurological:      General: No focal deficit present.      Mental Status: He is alert and oriented to person, place, and time.      Motor: No weakness.      Deep Tendon Reflexes: Reflexes normal.   Psychiatric:         Mood and Affect: Mood normal.         Behavior: Behavior normal.         Judgment: Judgment normal.           PAST MEDICAL HISTORY  He has a past medical history of Arthritis, Diabetes mellitus, type 2, Hyperlipidemia, and Hypertension.    SURGICAL HISTORY  He has no past surgical history on file.    FAMILY HISTORY  His family history is not on file.     ALLERGIES  Review of patient's allergies indicates:   Allergen Reactions    Codeine Rash    Hydrocodone-acetaminophen Rash       SOCIAL HISTORY   TOBACCO USE HISTORY: He reports that he has quit smoking. He does not have any smokeless tobacco history on file.   ALCOHOL USE HISTORY:  He reports no history of alcohol use.   RECREATIONAL DRUG USE HISTORY:  He reports no history of drug use.    Documentation entered by me for this encounter may have been done in part using speech-recognition technology. Although I have made an effort to ensure accuracy, "sound like" errors may exist and should be interpreted in context. -GUTIERREZ Crook MD.   "

## 2020-07-27 NOTE — ASSESSMENT & PLAN NOTE
STOP-BANG questionnaire to assess risk for obstructive sleep apnea (SOPHIE)  · Snoring: Do you snore loudly? ANSWER: YES  · Tired: Do you often feel tired, fatigued, or sleepy during daytime? ANSWER: YES Gilbert Sleepiness Scale Score = 8 (HIGHER NORMAL Daytime Sleepiness)  · Observed: Has anyone observed you stop breathing during your sleep? ANSWER: NO  · Pressure: Do you have or are you being treated for high blood pressure? ANSWER: YES  · BMI: BMI more than 35 kg/m2? ANSWER: YES (BMI = Body mass index is 38.44 kg/m².)   · Age: Age over 50 yr old? ANSWER: NO (Age = 50 y.o.)  · Neck circumference: Neck circumference greater than 40 cm? ANSWER: YES (Neck circumference = 46 cm, Mallampati class 3 oropharynx.  · Gender: Gender male? ANSWER: YES (Gender = male)    STOP-BANG Score = 6 (HIGH risk of SOPHIE)    INSTRUCTIONS PROVIDED: Do not drive or perform hazardous activities while drowsy.

## 2020-07-27 NOTE — LETTER
ATTN: CARE COORDINATION   PATIENT IDENTIFYING INFORMATION:  SASCHAYOAV JORDAN AMARJIT   [id number]  [place] [address] YOB: 1970  OUR MRN: [id number]   Home Phone [phone]   Work Phone Not on file.   Mobile [phone]        RECORDS REQUESTED FROM:   NAME OF HOSPITAL PHYSICIAN OR OTHER ENTITY       ADDRESS PHONE         AUTHORIZATION:  For the purpose of continuity of care, I, Jean Claude JORDAN Amarjit, hereby authorize the hospital, physician, or entity named above to release my medical records for the dates of service specified above to: GUTIERREZ Crook MD; [facility]; [address]; [place] [address]; PH: [phone]    REQUESTED METHOD OF DELIVERY: Fax ([phone]) or secure Email ([email or web address]  --------------------------------  SPECIFIC RECORDS REQUESTED:  diabetic eye exams    DATES OF SERVICE REQUESTED: 1/1/2018 through the present date  --------------------------------    In authorizing the release of the confidential information identified above, I hereby waive all restrictions or privileges imposed by law and release [facility] and Its affiliates and their staff from any restriction or privilege Imposed by law in connection with the disclosure or release of any professional record, observation or communication. I do understand that the information that is being released may be subject to re-disclosure by the recipient and may no longer be protected. I understand that my treatment, payment, enrollment or eligibility for benefits may not be conditioned on signing this authorization.  This authorization may be revoked in writing at any time, except to the extent that [facility] and its affiliates have already taken action in reliance on it. Letters to revoke this authorization should be addressed to [facility], Release of Information Department, [address] [place] [id number].     If not  previously revoked in writing, this authorization will terminate or  12 months after the date signed below.                   Signature of Patient    Date Signed

## 2020-07-28 ENCOUNTER — TELEPHONE (OUTPATIENT)
Dept: PULMONOLOGY | Facility: CLINIC | Age: 50
End: 2020-07-28

## 2020-07-28 NOTE — TELEPHONE ENCOUNTER
----- Message from Roseline Sapp sent at 7/28/2020  7:53 AM CDT -----  [facility]

## 2020-07-30 ENCOUNTER — CLINICAL SUPPORT (OUTPATIENT)
Dept: REHABILITATION | Facility: HOSPITAL | Age: 50
End: 2020-07-30
Attending: FAMILY MEDICINE
Payer: MEDICAID

## 2020-07-30 DIAGNOSIS — M54.2 NECK PAIN: ICD-10-CM

## 2020-07-30 DIAGNOSIS — G89.29 CHRONIC BILATERAL LOW BACK PAIN WITHOUT SCIATICA: ICD-10-CM

## 2020-07-30 DIAGNOSIS — M47.816 SPONDYLOSIS OF LUMBAR REGION WITHOUT MYELOPATHY OR RADICULOPATHY: ICD-10-CM

## 2020-07-30 DIAGNOSIS — M25.562 ACUTE PAIN OF BOTH KNEES: ICD-10-CM

## 2020-07-30 DIAGNOSIS — Z74.09 DECREASED FUNCTIONAL MOBILITY AND ENDURANCE: ICD-10-CM

## 2020-07-30 DIAGNOSIS — M54.50 CHRONIC BILATERAL LOW BACK PAIN WITHOUT SCIATICA: ICD-10-CM

## 2020-07-30 DIAGNOSIS — R53.1 GENERAL WEAKNESS: ICD-10-CM

## 2020-07-30 DIAGNOSIS — M25.561 ACUTE PAIN OF BOTH KNEES: ICD-10-CM

## 2020-07-30 PROCEDURE — 97110 THERAPEUTIC EXERCISES: CPT

## 2020-07-30 NOTE — PLAN OF CARE
[facility]  Physical Therapy Initial Evaluation    Name: Jean Claude Ocasio  Clinic Number: [id number]    Therapy Diagnosis:   Encounter Diagnoses   Name Primary?    Chronic bilateral low back pain without sciatica     Spondylosis of lumbar region without myelopathy or radiculopathy      Physician: GUTIERREZ Crook MD    Physician Orders: PT Eval and Treat   Medical Diagnosis from Referral:   M54.5,G89.29 (ICD-10-CM) - Chronic bilateral low back pain without sciatica   M47.816 (ICD-10-CM) - Spondylosis of lumbar region without myelopathy or radiculopathy   Evaluation Date: 7/30/2020  Authorization Period Expiration: 12/31/2020  Plan of Care Expiration: 9/24/2020  Visit # / Visits authorized: 1/1    Time In: 9:57 am  Time Out:10:45 am  Total Billable Time: 15 minutes    Precautions: Standard and Diabetes, HTN, Vitiligo (autoimmune)    Subjective   Date of onset: pain in the neck 1 and a half years ago and low back pain began about 8 years ago after slipping in the snow  History of current condition - Jean Claude reports: Pt reports he use to do a lot of heavy lifting as a  but has not been lately as he is no longer with such heavy items. 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 Pt leans to the R as the L low back gets sharp pain at times. Pt is not working often if at all at this time due to pain.     Pain:  Current 7/10, worst 10/10, best 4/10   Location: bilateral back , buttocks , generalized , hands , knee , lower legs, neck , shoulder , trunk and upper legs  Description: Aching, Dull, Burning, Tight, Tingling and Shooting  Aggravating Factors: Sitting, Standing, Laying, Bending, Walking, Night Time, Morning, Extension, Flexing, Lifting and Getting out of bed/chair  Easing Factors: relaxation, pain medication, heating pad and rest    Prior  Therapy: low back  Social History: wife and two children (1 1/2 and 6 years old)  Occupation:   Prior Level of Function: pt was working as a automechanic  Current Level of Function: Pt is not able to work at this time. Pt is not tolerating sitting, standing, kneeling or walking well due to neck pain, B low back and B knee pain and numbness in the ulnar forearm on the R. Reaching and overhead viewing is difficult and painful and at times he falls    Imaging, MRI studies see chart    Medical History:   Past Medical History:   Diagnosis Date    Arthritis     Diabetes mellitus, type 2     Hyperlipidemia     Hypertension        Surgical History:   Jean Claude Ocasio  has no past surgical history on file.    Medications:   Jean Claude has a current medication list which includes the following prescription(s): aspirin, baclofen, blood sugar diagnostic, blood-glucose meter, glimepiride, lancets, linagliptin, liraglutide 0.6 mg/0.1 ml (18 mg/3 ml) subq pnij, lisinopril, loratadine, meloxicam, metformin, pen needle, diabetic, and simvastatin.    Allergies:   Review of patient's allergies indicates:   Allergen Reactions    Codeine Rash    Hydrocodone-acetaminophen Rash        Pts goals: To return to work and have less pain so he can sustain in standing    Objective       CMS Impairment/Limitation/Restriction for FOTO low back Survey    Therapist reviewed FOTO scores for Jean Claude Ocasio on 7/30/2020.   FOTO documents entered into [employer] - see Media section.    Limitation Score: 38%       Posture/Structure: Pt presents with forward head posture, rounded shoulder's, severe forward head posture with reduced cervical lordosis, severely increase thoracic kyphosis, and reduced lumbar lordosis.  Pt is not able to sit upright with his head even in neutral for more than 1 minute then he quickly falls into poor posture forward with the head in excessive protraction and even at times in R side trunk flexion and slouching severely as  well. Pt has poor seated tolerance due to pain.    Gait: Non antalgic with equal UE/LE swing but short step length and poor posture    Neuro/Sensation:    Sensation: loss of light touch to R medial ulnar forearm    Balance: Single leg stand R=unable, L=unable; half kneel unable; squat unable    Cervical spine AROM in Degrees:  Flexion 60   Extension  20  R side flex 30   L side flex 34  R rotation 40  L rotation 30    Shoulder ROM: flexion 140 degrees B limited by neck and back pain; Abduction 120 R 130 L; external rotation 75 degrees B; internal rotation 60 degrees B    Strength 4/5 grossly in shoulder elbows and hands but lower traps at 2/5 at best  Abdominal strength: rectus abdominus, obliques and transverse abdominus 2/5        Lumbar spine AROM:     Degrees Pain Present (Y/N)   Flexion 10 y   R side bend 5 y   L side bend 5 y   Extend 5 y     Hip AROM:     Degrees (R/L) Pain Present (Y/N)   Hip flex 90/90 but hamstrings 50 B y   Hip Abd 10/10 y   Hip ER (sitting) 30/20 y   Hip IR (sitting) 20/20 y   Hip Ext (prone) 0/0 y         Strength:    R L   Hip flexors L2   3/5 3/5  Quadriceps L234  3+/5 3+/5    Hamstrings S1  3+/5 3/5   Plantar flexion S1  2/5 2/5    Dorsiflexion L4  4/5 4/5   Internal rotation  3/5 3/5   External rotation  3/5 3/5   Hip abduction   3/5 3/5   Gluteus Medius L45S1 2+/5 2+/5   Gluteus Efrain  2+/5 2+/5   Great toe extension  4/5 4/5     Special Test: Slump test: (-)      SLR Test:  (-) but very limited ROM Quadrant:  (+) neck on R; distraction reduces R UE symptoms    Double Leg Lowering: (unable)    Jorge:  (+)      MIGUEL ANGEL  (-) but very very limited ROM L>R      Анна cervical retraction improving tolerances for extension but adding flexion helps ease pain and improves functional ROM. Pt lumbar flexion/extension is not tolerated in standing but better with lumbar flexion in supine      PIVM Lumbar/Thoracic: limited cervical reversal of curve towards lordosis and in the thoracic and  lumbar spine      Palpation: tight UT, pect minor B, erectors, hamstrings, calves, hip flexors, lats and serratus     Neural Tension Test: (+) R UE with median and ulnar nerve testing; negative in LE's but unclear as such limited hamstring ROM we may not have gotten a good assessment.    TREATMENT   Treatment Time In: 10:20 am  Treatment Time Out: 10:45 am  Total Treatment time separate from Evaluation: 25 minutes    Jean Claude received therapeutic exercises to develop endurance, ROM and flexibility for 25 minutes including:  Cervical retraction seated 2x10  Cervical retraction with chin tuck in supine 2x10  Hamstring stretch 2x2 sec B  Posterior pelvic tilt (PPT) seated 2x5  PPT standing 2x3  PPT supine 2x10      Home Exercises and Patient Education Provided    Education provided:   -Education on condition, HEP, and PT discussed the neck problem is making his back worse and his back is limiting his neck posture as well. In order to gain improved mechanics he needs to work on a regular basis with his HEP (at least 3 x a day right now) to begin remodeling tissue to advance functional mobility and get him to simple neutral spine seated. Pt concurred that he will work on his HEP more than a daily walking routine as his back currently is worse with walking. PT explained his lack of mobility pinches the nerves in the neck and back and will need improved positioning and strength to abolish his symptoms but it will be a slow process due to multiple joints and deconditioning involved.     Written Home Exercises Provided: Patient instructed to cont prior HEP.  Exercises were reviewed and Jean Claude was able to demonstrate them prior to the end of the session.  Jean Claude demonstrated good  understanding of the education provided.     See EMR under Patient Instructions for exercises provided 7/30/2020.    Assessment   Jean Claude is a 50 y.o. male referred to outpatient Physical Therapy with a medical diagnosis of   M54.5,G89.29 (ICD-10-CM) -  Chronic bilateral low back pain without sciatica   M47.816 (ICD-10-CM) - Spondylosis of lumbar region without myelopathy or radiculopathy     . The patient presents with signs and symptoms consistent with diagnosis along with low back pain, neck pain, general weakness, decreased functional mobility tolerances and endurance and with impairments which include decreased ROM, decreased strength, decreased joint mobility, joint hypermobility , decreased muscle length, impaired coordination, impaired balance, postural abnormalities, gait abnormalities and decreased overall function.  These impairments are limiting patient's ability to sit, stand, walk simple community distances, work, lift, squat, kneel or reach overhead for household chores or simple meal prep.     Pt prognosis is Good.   Pt will benefit from skilled outpatient Physical Therapy to address the deficits stated above and in the chart below, provide pt/family education, and to maximize pt's level of independence.     Plan of care discussed with patient: Yes  Pt's spiritual, cultural and educational needs considered and patient is agreeable to the plan of care and goals as stated below:     Anticipated Barriers for therapy: not working at this time, finances, PMHX, deconditioned    Medical Necessity is demonstrated by the following  History  Co-morbidities and personal factors that may impact the plan of care Co-morbidities:   See history  HTN, DMII  financial    Personal Factors:   coping style  social background  lifestyle  character     high   Examination  Body Structures and Functions, activity limitations and participation restrictions that may impact the plan of care Body Regions:   neck  back  lower extremities  upper extremities    Body Systems:    ROM  strength  gross coordinated movement  balance  gait  transfers  motor control    Participation Restrictions:   See current limitations    Activity limitations:   Learning and applying  knowledge  watching  listening    General Tasks and Commands  undertaking a single task  undertaking multiple tasks    Communication  communicating with/receiving spoken language    Mobility  lifting and carrying objects  fine hand use (grasping/picking up)  walking  using transportation (bus, train, plane, car)  driving (bike, car, motorcycle)    Self care  washing oneself (bathing, drying, washing hands)  caring for body parts (brushing teeth, shaving, grooming)  toileting  dressing  drinking  looking after one's health    Domestic Life  shopping  cooking  doing house work (cleaning house, washing dishes, laundry)  assisting others  not working at this time    Interactions/Relationships  basic interpersonal interactions  relating with strangers  family relationships  intimate relationships    Life Areas  employment    Community and Social Life  community life  recreation and leisure         high   Clinical Presentation unstable clinical presentation with unpredictable characteristics high   Decision Making/ Complexity Score: high     Goals:  Short Term Goals: In 4 weeks 8/27/2020  1.I with HEP  2.Pt to increase lumbar ROM to 20 degrees with flexion  3.Pt to increase hamstring AROM to 70 degrees .  4.Pt to increase B hip abduction and flexion strength by 1/2 grade.   5.Pt to have pain less than 7/10 or better at all times.  6.Pt to improve score on the FOTO by 5%.  7. Pt to be educated on postural/body mechanics awareness.    Long Term Goals: In 8 weeks 9/24/2020  1.Patient to improve score on the FOTO to 32% or better.  2. Patient to demo increase in LE strength to 4/5  3. Patient to have decreased pain to 3/10 or better at worst.  4. Patient to demo increase cervical extension ROM to 40 degrees or better.  5. Patient to increase hip AROM to 100 degrees with flexion.  6.. Patient to perform daily activities including squatting to pick an item up off the floor, reaching overhead and walking for community distances  without limitation.  7. Pt to perform shoulder flexion to 155 degree or better.      Plan   Plan of care Certification: 7/30/2020 to 9/24/2020.    Outpatient Physical Therapy 3 times weekly for 3 weeks then 2x a week for 5 weeks to include the following interventions: Cervical/Lumbar Traction, Electrical Stimulation IFC, NMES, Russian, Gait Training, Manual Therapy, Moist Heat/ Ice, Neuromuscular Re-ed, Patient Education, Self Care, Therapeutic Activites and Therapeutic Exercise, DN.     Becky Light, PT, CIDN, SFMA    Thank you for this referral.    These services are reasonable and necessary for the conditions set forth above while under my care.

## 2020-07-30 NOTE — PROGRESS NOTES
[facility]  Physical Therapy Initial Evaluation    Name: Jean Claude Ocasio  Clinic Number: [id number]    Therapy Diagnosis:   Encounter Diagnoses   Name Primary?    Chronic bilateral low back pain without sciatica     Spondylosis of lumbar region without myelopathy or radiculopathy      Physician: GUTIERREZ Crook MD    Physician Orders: PT Eval and Treat   Medical Diagnosis from Referral:   M54.5,G89.29 (ICD-10-CM) - Chronic bilateral low back pain without sciatica   M47.816 (ICD-10-CM) - Spondylosis of lumbar region without myelopathy or radiculopathy   Evaluation Date: 7/30/2020  Authorization Period Expiration: 12/31/2020  Plan of Care Expiration: 9/24/2020  Visit # / Visits authorized: 1/1    Time In: 9:57 am  Time Out:10:45 am  Total Billable Time: 15 minutes    Precautions: Standard and Diabetes, HTN, Vitiligo (autoimmune)    Subjective   Date of onset: pain in the neck 1 and a half years ago and low back pain began about 8 years ago after slipping in the snow  History of current condition - Jean Claude reports: Pt reports he use to do a lot of heavy lifting as a  but has not been lately as he is no longer with such heavy items. 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 Pt leans to the R as the L low back gets sharp pain at times. Pt is not working often if at all at this time due to pain.     Pain:  Current 7/10, worst 10/10, best 4/10   Location: bilateral back , buttocks , generalized , hands , knee , lower legs, neck , shoulder , trunk and upper legs  Description: Aching, Dull, Burning, Tight, Tingling and Shooting  Aggravating Factors: Sitting, Standing, Laying, Bending, Walking, Night Time, Morning, Extension, Flexing, Lifting and Getting out of bed/chair  Easing Factors: relaxation, pain medication, heating pad and rest    Prior  Therapy: low back  Social History: wife and two children (1 1/2 and 6 years old)  Occupation:   Prior Level of Function: pt was working as a automechanic  Current Level of Function: Pt is not able to work at this time. Pt is not tolerating sitting, standing, kneeling or walking well due to neck pain, B low back and B knee pain and numbness in the ulnar forearm on the R. Reaching and overhead viewing is difficult and painful and at times he falls    Imaging, MRI studies see chart    Medical History:   Past Medical History:   Diagnosis Date    Arthritis     Diabetes mellitus, type 2     Hyperlipidemia     Hypertension        Surgical History:   Jean Claude Ocasio  has no past surgical history on file.    Medications:   Jean Claude has a current medication list which includes the following prescription(s): aspirin, baclofen, blood sugar diagnostic, blood-glucose meter, glimepiride, lancets, linagliptin, liraglutide 0.6 mg/0.1 ml (18 mg/3 ml) subq pnij, lisinopril, loratadine, meloxicam, metformin, pen needle, diabetic, and simvastatin.    Allergies:   Review of patient's allergies indicates:   Allergen Reactions    Codeine Rash    Hydrocodone-acetaminophen Rash        Pts goals: To return to work and have less pain so he can sustain in standing    Objective       CMS Impairment/Limitation/Restriction for FOTO low back Survey    Therapist reviewed FOTO scores for Jean Claude Ocasio on 7/30/2020.   FOTO documents entered into [employer] - see Media section.    Limitation Score: 38%       Posture/Structure: Pt presents with forward head posture, rounded shoulder's, severe forward head posture with reduced cervical lordosis, severely increase thoracic kyphosis, and reduced lumbar lordosis.  Pt is not able to sit upright with his head even in neutral for more than 1 minute then he quickly falls into poor posture forward with the head in excessive protraction and even at times in R side trunk flexion and slouching severely as  well. Pt has poor seated tolerance due to pain.    Gait: Non antalgic with equal UE/LE swing but short step length and poor posture    Neuro/Sensation:    Sensation: loss of light touch to R medial ulnar forearm    Balance: Single leg stand R=unable, L=unable; half kneel unable; squat unable    Cervical spine AROM in Degrees:  Flexion 60   Extension  20  R side flex 30   L side flex 34  R rotation 40  L rotation 30    Shoulder ROM: flexion 140 degrees B limited by neck and back pain; Abduction 120 R 130 L; external rotation 75 degrees B; internal rotation 60 degrees B    Strength 4/5 grossly in shoulder elbows and hands but lower traps at 2/5 at best  Abdominal strength: rectus abdominus, obliques and transverse abdominus 2/5        Lumbar spine AROM:     Degrees Pain Present (Y/N)   Flexion 10 y   R side bend 5 y   L side bend 5 y   Extend 5 y     Hip AROM:     Degrees (R/L) Pain Present (Y/N)   Hip flex 90/90 but hamstrings 50 B y   Hip Abd 10/10 y   Hip ER (sitting) 30/20 y   Hip IR (sitting) 20/20 y   Hip Ext (prone) 0/0 y         Strength:    R L   Hip flexors L2   3/5 3/5  Quadriceps L234  3+/5 3+/5    Hamstrings S1  3+/5 3/5   Plantar flexion S1  2/5 2/5    Dorsiflexion L4  4/5 4/5   Internal rotation  3/5 3/5   External rotation  3/5 3/5   Hip abduction   3/5 3/5   Gluteus Medius L45S1 2+/5 2+/5   Gluteus Efrain  2+/5 2+/5   Great toe extension  4/5 4/5     Special Test: Slump test: (-)      SLR Test:  (-) but very limited ROM Quadrant:  (+) neck on R; distraction reduces R UE symptoms    Double Leg Lowering: (unable)    Jorge:  (+)      MIGUEL ANGEL  (-) but very very limited ROM L>R      Анна cervical retraction improving tolerances for extension but adding flexion helps ease pain and improves functional ROM. Pt lumbar flexion/extension is not tolerated in standing but better with lumbar flexion in supine      PIVM Lumbar/Thoracic: limited cervical reversal of curve towards lordosis and in the thoracic and  lumbar spine      Palpation: tight UT, pect minor B, erectors, hamstrings, calves, hip flexors, lats and serratus     Neural Tension Test: (+) R UE with median and ulnar nerve testing; negative in LE's but unclear as such limited hamstring ROM we may not have gotten a good assessment.    TREATMENT   Treatment Time In: 10:20 am  Treatment Time Out: 10:45 am  Total Treatment time separate from Evaluation: 25 minutes    Jean Claude received therapeutic exercises to develop endurance, ROM and flexibility for 25 minutes including:  Cervical retraction seated 2x10  Cervical retraction with chin tuck in supine 2x10  Hamstring stretch 2x2 sec B  Posterior pelvic tilt (PPT) seated 2x5  PPT standing 2x3  PPT supine 2x10      Home Exercises and Patient Education Provided    Education provided:   -Education on condition, HEP, and PT discussed the neck problem is making his back worse and his back is limiting his neck posture as well. In order to gain improved mechanics he needs to work on a regular basis with his HEP (at least 3 x a day right now) to begin remodeling tissue to advance functional mobility and get him to simple neutral spine seated. Pt concurred that he will work on his HEP more than a daily walking routine as his back currently is worse with walking. PT explained his lack of mobility pinches the nerves in the neck and back and will need improved positioning and strength to abolish his symptoms but it will be a slow process due to multiple joints and deconditioning involved.     Written Home Exercises Provided: Patient instructed to cont prior HEP.  Exercises were reviewed and Jean Claude was able to demonstrate them prior to the end of the session.  Jean Claude demonstrated good  understanding of the education provided.     See EMR under Patient Instructions for exercises provided 7/30/2020.    Assessment   Jean Claude is a 50 y.o. male referred to outpatient Physical Therapy with a medical diagnosis of   M54.5,G89.29 (ICD-10-CM) -  Chronic bilateral low back pain without sciatica   M47.816 (ICD-10-CM) - Spondylosis of lumbar region without myelopathy or radiculopathy     . The patient presents with signs and symptoms consistent with diagnosis along with low back pain, neck pain, general weakness, decreased functional mobility tolerances and endurance and with impairments which include decreased ROM, decreased strength, decreased joint mobility, joint hypermobility , decreased muscle length, impaired coordination, impaired balance, postural abnormalities, gait abnormalities and decreased overall function.  These impairments are limiting patient's ability to sit, stand, walk simple community distances, work, lift, squat, kneel or reach overhead for household chores or simple meal prep.     Pt prognosis is Good.   Pt will benefit from skilled outpatient Physical Therapy to address the deficits stated above and in the chart below, provide pt/family education, and to maximize pt's level of independence.     Plan of care discussed with patient: Yes  Pt's spiritual, cultural and educational needs considered and patient is agreeable to the plan of care and goals as stated below:     Anticipated Barriers for therapy: not working at this time, finances, PMHX, deconditioned    Medical Necessity is demonstrated by the following  History  Co-morbidities and personal factors that may impact the plan of care Co-morbidities:   See history  HTN, DMII  financial    Personal Factors:   coping style  social background  lifestyle  character     high   Examination  Body Structures and Functions, activity limitations and participation restrictions that may impact the plan of care Body Regions:   neck  back  lower extremities  upper extremities    Body Systems:    ROM  strength  gross coordinated movement  balance  gait  transfers  motor control    Participation Restrictions:   See current limitations    Activity limitations:   Learning and applying  knowledge  watching  listening    General Tasks and Commands  undertaking a single task  undertaking multiple tasks    Communication  communicating with/receiving spoken language    Mobility  lifting and carrying objects  fine hand use (grasping/picking up)  walking  using transportation (bus, train, plane, car)  driving (bike, car, motorcycle)    Self care  washing oneself (bathing, drying, washing hands)  caring for body parts (brushing teeth, shaving, grooming)  toileting  dressing  drinking  looking after one's health    Domestic Life  shopping  cooking  doing house work (cleaning house, washing dishes, laundry)  assisting others  not working at this time    Interactions/Relationships  basic interpersonal interactions  relating with strangers  family relationships  intimate relationships    Life Areas  employment    Community and Social Life  community life  recreation and leisure         high   Clinical Presentation unstable clinical presentation with unpredictable characteristics high   Decision Making/ Complexity Score: high     Goals:  Short Term Goals: In 4 weeks 8/27/2020  1.I with HEP  2.Pt to increase lumbar ROM to 20 degrees with flexion  3.Pt to increase hamstring AROM to 70 degrees .  4.Pt to increase B hip abduction and flexion strength by 1/2 grade.   5.Pt to have pain less than 7/10 or better at all times.  6.Pt to improve score on the FOTO by 5%.  7. Pt to be educated on postural/body mechanics awareness.    Long Term Goals: In 8 weeks 9/24/2020  1.Patient to improve score on the FOTO to 32% or better.  2. Patient to demo increase in LE strength to 4/5  3. Patient to have decreased pain to 3/10 or better at worst.  4. Patient to demo increase cervical extension ROM to 40 degrees or better.  5. Patient to increase hip AROM to 100 degrees with flexion.  6.. Patient to perform daily activities including squatting to pick an item up off the floor, reaching overhead and walking for community distances  without limitation.  7. Pt to perform shoulder flexion to 155 degree or better.      Plan   Plan of care Certification: 7/30/2020 to 9/24/2020.    Outpatient Physical Therapy 3 times weekly for 3 weeks then 2x a week for 5 weeks to include the following interventions: Cervical/Lumbar Traction, Electrical Stimulation IFC, NMES, Russian, Gait Training, Manual Therapy, Moist Heat/ Ice, Neuromuscular Re-ed, Patient Education, Self Care, Therapeutic Activites and Therapeutic Exercise, DN.     Becky Light, PT, CIDN, SFMA    Thank you for this referral.    These services are reasonable and necessary for the conditions set forth above while under my care.

## 2020-07-31 ENCOUNTER — CLINICAL SUPPORT (OUTPATIENT)
Dept: REHABILITATION | Facility: HOSPITAL | Age: 50
End: 2020-07-31
Payer: MEDICAID

## 2020-07-31 DIAGNOSIS — R53.1 GENERAL WEAKNESS: ICD-10-CM

## 2020-07-31 DIAGNOSIS — R20.0 BILATERAL NUMBNESS AND TINGLING OF ARMS AND LEGS: ICD-10-CM

## 2020-07-31 DIAGNOSIS — Z74.09 DECREASED FUNCTIONAL MOBILITY AND ENDURANCE: ICD-10-CM

## 2020-07-31 DIAGNOSIS — R20.2 BILATERAL NUMBNESS AND TINGLING OF ARMS AND LEGS: ICD-10-CM

## 2020-07-31 DIAGNOSIS — M54.2 NECK PAIN: ICD-10-CM

## 2020-07-31 DIAGNOSIS — M25.562 ACUTE PAIN OF BOTH KNEES: ICD-10-CM

## 2020-07-31 DIAGNOSIS — M25.561 ACUTE PAIN OF BOTH KNEES: ICD-10-CM

## 2020-07-31 PROCEDURE — 97110 THERAPEUTIC EXERCISES: CPT

## 2020-07-31 PROCEDURE — 97140 MANUAL THERAPY 1/> REGIONS: CPT

## 2020-07-31 NOTE — PROGRESS NOTES
Physical Therapy Daily Treatment Note     Name: Jean Claude JORDAN [facility]  Clinic Number: [id number]    Therapy Diagnosis:   Encounter Diagnoses   Name Primary?    Neck pain     Acute pain of both knees     General weakness     Decreased functional mobility and endurance     Bilateral numbness and tingling of arms and legs      Physician: GUTIERREZ Crook MD    Visit Date: 7/31/2020    Physician Orders: PT Eval and Treat   Medical Diagnosis from Referral:   M54.5,G89.29 (ICD-10-CM) - Chronic bilateral low back pain without sciatica   M47.816 (ICD-10-CM) - Spondylosis of lumbar region without myelopathy or radiculopathy   Evaluation Date: 7/30/2020  Authorization Period Expiration: 12/31/2020  Plan of Care Expiration: 9/24/2020    Visit #/Visits authorized: 2/20    Precautions: Standard and Diabetes, HTN, Vitiligo (autoimmune)    Time In: 10:00 am  Time Out: 10:44 am  Total Billable Time: 44 minutes    SUBJECTIVE     Today, pt reports: his neck pain is so much better than on the last visit. No shooting pain in the legs and he can hold his head up a little better.    He was compliant with home exercise program.  Response to previous treatment: less pain  Functional change: nearly neutral neck posture in standing    Pre-Treatment Pain: 5/10  Post-Treatment Pain: 4/10  Location: low back, R shoulder and neck; knees  TREATMENT     Jean Claude received therapeutic exercises to develop strength, endurance, ROM and flexibility for 35 minutes including:    Exercise 7/31/2020   Cervical retraction in supine on pillow then Physioball 3x10, 3x10 then with chin tuck on ball 2x10   Posterior pelvic tilt (PPT) with physioball under legs 3x10 then add heel slides with PPT 3x10   T spine extension on foam 3 min   Supine march with transverse abdominus and PPT activated 2x10 B   Single knee to chest 2 x 30 sec   Ball squeeze in hooklying 3x8 then add PPT with squeeze 1x10               Jean Claude received the following manual therapy techniques:  Joint mobilizations, Manual traction, Myofacial release and Soft tissue Mobilization were applied to the: suboccipitals, upper traps, levators for 8 minutes with PA to the thoracic spine as well from T3-8 grade II      Jean Claude participated in neuromuscular re-education activities to improve: Balance, Coordination, Proprioception and Posture for 0 minutes. The following activities were included:    Exercise 7/31/2020                                       Home Exercises Provided and Patient Education Provided     Education/Self-Care provided: (during therex )   Patient educated on biomechanical justification for therapeutic exercise and importance of compliance with HEP in order to improve overall impairments and QOL    Patient educated on postural awareness and the use of a lumbar roll when in a seated position to reduce stress and maintain optimal alignment of the spine.    Patient educated on the importance of improved core and upper and lower extremity strength in order to improve alignment of the spine and upper and lower extremities with static positions and dynamic movement.    Patient educated on the importance of strong core and lower extremity musculature in order to improve both static and dynamic balance, improve gait mechanics, reduce fall risk and improve household and community mobility.       Written Home Exercises Provided: Patient instructed to cont prior HEP.  Exercises were reviewed and Jean Claude was able to demonstrate them prior to the end of the session.  Jean Claude demonstrated good  understanding of the education provided.     See EMR under Patient Instructions for exercises provided prior visit.    ASSESSMENT   Pt tolerated manual therapy well with reports of decreased pain and tension in musculature but the R upper trap was very tight and mid scapular region was irritated. Pt demonstrated increased spinal extension by the end of the session as on arrival he was so tight in the thoracic, cervical  and lumbar spine that he could not get to neutral in the spine on the foam roller but at the end he was able to get to neutral. Pt was very very restricted functionally with therex initially as he needed maximum assistance to facilitate the core with a simple posterior pelvic tilt but now is up to 50% better by the end of the session. Pt needs considerable work to tolerate a good core exercise but he is advancing well in one short session and clearly was good at working on his HEP as his head sat more close to neutral. Pt tolerated exercise well with reports of increased fatigue but no increased pain. Pt demonstrated good understanding of exercises and required minimal cueing to maintain proper form.      Jean Claude is progressing well towards his goals.   Pt prognosis is Excellent.     Pt will continue to benefit from skilled outpatient physical therapy to address the deficits listed in the problem list box on initial evaluation, provide pt/family education and to maximize pt's level of independence in the home and community environment.     Pt's spiritual, cultural and educational needs considered and pt agreeable to plan of care and goals.     Anticipated barriers to physical therapy: hx of prior PT with limited success and limited consistent compliance    Goals:   Short Term Goals: In 4 weeks 8/27/2020  1.I with HEP  2.Pt to increase lumbar ROM to 20 degrees with flexion  3.Pt to increase hamstring AROM to 70 degrees .  4.Pt to increase B hip abduction and flexion strength by 1/2 grade.   5.Pt to have pain less than 7/10 or better at all times.  6.Pt to improve score on the FOTO by 5%.  7. Pt to be educated on postural/body mechanics awareness.     Long Term Goals: In 8 weeks 9/24/2020  1.Patient to improve score on the FOTO to 32% or better.  2. Patient to demo increase in LE strength to 4/5  3. Patient to have decreased pain to 3/10 or better at worst.  4. Patient to demo increase cervical extension ROM to 40  degrees or better.  5. Patient to increase hip AROM to 100 degrees with flexion.  6.. Patient to perform daily activities including squatting to pick an item up off the floor, reaching overhead and walking for community distances without limitation.  7. Pt to perform shoulder flexion to 155 degree or better.       PLAN   Continue Plan of Care (POC) and progress per patient tolerance.    Becky Light, PT, CIDN, SFMA

## 2020-08-04 ENCOUNTER — CLINICAL SUPPORT (OUTPATIENT)
Dept: REHABILITATION | Facility: HOSPITAL | Age: 50
End: 2020-08-04
Payer: MEDICAID

## 2020-08-04 DIAGNOSIS — R53.1 GENERAL WEAKNESS: ICD-10-CM

## 2020-08-04 DIAGNOSIS — M25.562 ACUTE PAIN OF BOTH KNEES: ICD-10-CM

## 2020-08-04 DIAGNOSIS — M25.561 ACUTE PAIN OF BOTH KNEES: ICD-10-CM

## 2020-08-04 DIAGNOSIS — R20.2 BILATERAL NUMBNESS AND TINGLING OF ARMS AND LEGS: ICD-10-CM

## 2020-08-04 DIAGNOSIS — R20.0 BILATERAL NUMBNESS AND TINGLING OF ARMS AND LEGS: ICD-10-CM

## 2020-08-04 DIAGNOSIS — M54.2 NECK PAIN: ICD-10-CM

## 2020-08-04 DIAGNOSIS — Z74.09 DECREASED FUNCTIONAL MOBILITY AND ENDURANCE: ICD-10-CM

## 2020-08-04 PROCEDURE — 97110 THERAPEUTIC EXERCISES: CPT

## 2020-08-04 NOTE — PROGRESS NOTES
Physical Therapy Daily Treatment Note     Name: Jean Claude JORDAN [facility]  Clinic Number: [id number]    Therapy Diagnosis:   Encounter Diagnoses   Name Primary?    Neck pain     Acute pain of both knees     General weakness     Decreased functional mobility and endurance     Bilateral numbness and tingling of arms and legs      Physician: GUTIERREZ Crook MD    Visit Date: 8/4/2020    Physician Orders: PT Eval and Treat   Medical Diagnosis from Referral:   M54.5,G89.29 (ICD-10-CM) - Chronic bilateral low back pain without sciatica   M47.816 (ICD-10-CM) - Spondylosis of lumbar region without myelopathy or radiculopathy   Evaluation Date: 7/30/2020  Authorization Period Expiration: 12/31/2020  Plan of Care Expiration: 9/24/2020    Visit #/Visits authorized: 3/20    Precautions: Standard and Diabetes, HTN, Vitiligo (autoimmune)    Time In: 07:05 am  Time Out: 08:00 am  Total Billable Time: 55 minutes    SUBJECTIVE     Today, pt reports: his neck pain is so much better than on the last visit and continues to improve and his back is slowly improving. Pain in the R shoulder is still present.    He was compliant with home exercise program.  Response to previous treatment: less pain in the neck and back  Functional change: standing and walking increase R anterior numbness at times in thigh    Pre-Treatment Pain: 4/10  Post-Treatment Pain: 3/10  Location: low back, R shoulder and neck; knees  TREATMENT     Jean Claude received therapeutic exercises to develop strength, endurance, ROM and flexibility for 55 minutes including:    Exercise 8/4/2020   Cervical retraction in supine on pillow then Physioball 6x10 then add Right rotation 2x8   Posterior pelvic tilt (PPT)  3x10 then add heel slides with PPT 2x10 B   T spine extension on foam 3 min   Supine march with transverse abdominus (TA) and PPT activated 3x8 B   Single knee to chest 0 x 30 sec   Ball squeeze in hooklying squeeze 2x10   PPT and TA with bridges 2x10   Prone T 2x10 B    Prone shoulder extension 2x10 B   sidelying hip abduction    Sidelying (SL) hip adduction    SL reverse clams with ball  1x8 B with TA   PPT with TA and SLR 2x8 B       Jean Claude received the following manual therapy techniques: Joint mobilizations, Manual traction, Myofacial release and Soft tissue Mobilization were applied to the: suboccipitals, upper traps, levators for 0 minutes with PA to the thoracic spine as well from T3-8 grade II      Jean Claude participated in neuromuscular re-education activities to improve: Balance, Coordination, Proprioception and Posture for 0 minutes. The following activities were included:    Exercise 8/4/2020                                       Home Exercises Provided and Patient Education Provided     Education/Self-Care provided: (during therex )   Patient educated on biomechanical justification for therapeutic exercise and importance of compliance with HEP in order to improve overall impairments and QOL    Patient educated on postural awareness and the use of a lumbar roll when in a seated position to reduce stress and maintain optimal alignment of the spine.    Patient educated on the importance of improved core and upper and lower extremity strength in order to improve alignment of the spine and upper and lower extremities with static positions and dynamic movement.    Patient educated on the importance of strong core and lower extremity musculature in order to improve both static and dynamic balance, improve gait mechanics, reduce fall risk and improve household and community mobility.       Written Home Exercises Provided: Patient instructed to cont prior HEP.  Exercises were reviewed and Jean Claude was able to demonstrate them prior to the end of the session.  Jean Claude demonstrated good  understanding of the education provided.     See EMR under Patient Instructions for exercises provided prior visit.    ASSESSMENT   Pt arrived with less difficulty, more upright but the head is still  forward about 20%. Pt states his leg pain is still intermittent but less frequent. Pt is standing much more upright showing objective matching subjective reports. Pt is able to facilitated core support better today but cervical retraction is still stiff at the last 20% ROM. PT used physioball to encourage strength gains on this task but he will need more work as well as improved ROM in the thoracic and scapular muscles to advance. Pt could only perform mekhi extension at 70% ROM at the max and bridges and prone shoulder extension produced mild L SI joint pain that eased with transverse abdominus activation. Pt UE strength on the R shoulder limited mekhi extension reps due to pain and some weakness. Pt tolerated exercise well with reports of increased fatigue but no increased pain. Pt demonstrated good understanding of exercises and required minimal to moderatel cueing to maintain proper form.      Jean Claude is progressing well towards his goals.   Pt prognosis is Excellent.     Pt will continue to benefit from skilled outpatient physical therapy to address the deficits listed in the problem list box on initial evaluation, provide pt/family education and to maximize pt's level of independence in the home and community environment.     Pt's spiritual, cultural and educational needs considered and pt agreeable to plan of care and goals.     Anticipated barriers to physical therapy: hx of prior PT with limited success and limited consistent compliance    Goals:   Short Term Goals: In 4 weeks 8/27/2020  1.I with HEP  2.Pt to increase lumbar ROM to 20 degrees with flexion  3.Pt to increase hamstring AROM to 70 degrees .  4.Pt to increase B hip abduction and flexion strength by 1/2 grade.   5.Pt to have pain less than 7/10 or better at all times.  6.Pt to improve score on the FOTO by 5%.  7. Pt to be educated on postural/body mechanics awareness.     Long Term Goals: In 8 weeks 9/24/2020  1.Patient to improve score on  the FOTO to 32% or better.  2. Patient to demo increase in LE strength to 4/5  3. Patient to have decreased pain to 3/10 or better at worst.  4. Patient to demo increase cervical extension ROM to 40 degrees or better.  5. Patient to increase hip AROM to 100 degrees with flexion.  6.. Patient to perform daily activities including squatting to pick an item up off the floor, reaching overhead and walking for community distances without limitation.  7. Pt to perform shoulder flexion to 155 degree or better.       PLAN   Continue Plan of Care (POC) and progress per patient tolerance.    Becky Light, PT, CIDN, SFMA

## 2020-08-12 ENCOUNTER — CLINICAL SUPPORT (OUTPATIENT)
Dept: REHABILITATION | Facility: HOSPITAL | Age: 50
End: 2020-08-12
Payer: MEDICAID

## 2020-08-12 DIAGNOSIS — R20.0 BILATERAL NUMBNESS AND TINGLING OF ARMS AND LEGS: ICD-10-CM

## 2020-08-12 DIAGNOSIS — M54.2 NECK PAIN: ICD-10-CM

## 2020-08-12 DIAGNOSIS — R53.1 GENERAL WEAKNESS: ICD-10-CM

## 2020-08-12 DIAGNOSIS — R20.2 BILATERAL NUMBNESS AND TINGLING OF ARMS AND LEGS: ICD-10-CM

## 2020-08-12 DIAGNOSIS — M25.562 ACUTE PAIN OF BOTH KNEES: ICD-10-CM

## 2020-08-12 DIAGNOSIS — M25.561 ACUTE PAIN OF BOTH KNEES: ICD-10-CM

## 2020-08-12 DIAGNOSIS — Z74.09 DECREASED FUNCTIONAL MOBILITY AND ENDURANCE: ICD-10-CM

## 2020-08-12 PROCEDURE — 97112 NEUROMUSCULAR REEDUCATION: CPT

## 2020-08-12 PROCEDURE — 97110 THERAPEUTIC EXERCISES: CPT

## 2020-08-12 NOTE — PROGRESS NOTES
Physical Therapy Daily Treatment Note     Name: Jean Claude JORDAN [facility]  Clinic Number: [id number]    Therapy Diagnosis:   Encounter Diagnoses   Name Primary?    Neck pain     Acute pain of both knees     General weakness     Decreased functional mobility and endurance     Bilateral numbness and tingling of arms and legs      Physician: GUTIERREZ Crook MD    Visit Date: 8/12/2020    Physician Orders: PT Eval and Treat   Medical Diagnosis from Referral:   M54.5,G89.29 (ICD-10-CM) - Chronic bilateral low back pain without sciatica   M47.816 (ICD-10-CM) - Spondylosis of lumbar region without myelopathy or radiculopathy   Evaluation Date: 7/30/2020  Authorization Period Expiration: 12/31/2020  Plan of Care Expiration: 9/24/2020    Visit #/Visits authorized: 4/20    Precautions: Standard and Diabetes, HTN, Vitiligo (autoimmune)    Time In: 01:18 pm  Time Out: 2:00 pm  Total Billable Time: 42 minutes    SUBJECTIVE     Today, pt reports: his neck pain continues to improve and R thigh pain has not occurred since the last week.     He was compliant with home exercise program.  Response to previous treatment: less pain in the neck and R LE  Functional change: standing and walking increase R anterior numbness at times in thigh    Pre-Treatment Pain: 4/10  Post-Treatment Pain: 4/10  Location: low back, R shoulder and neck; knees  TREATMENT   Hamstring ROM improved from 50 degrees to 65 degrees B with mild low back pain at the L SI still intermittently with therex today (eases with TA activation).    Jean Claude received therapeutic exercises to develop strength, endurance, ROM and flexibility for 30 minutes including:    Exercise 8/12/2020   Cervical retraction in supine on pillow then Physioball 3x10 then add rotation 2x10   Posterior pelvic tilt (PPT)  3x10 then add heel slides with PPT 0x10 B   T spine extension on foam 3 min 2 positins       Single knee to chest 0 x 30 sec   Ball squeeze in hooklying squeeze 3x10   PPT and TA  with bridges 3x10 with ball squeeze   Prone T 0x10 B   Prone shoulder extension 0x10 B   sidelying hip abduction 2x8 B   Sidelying (SL) hip adduction L LE 2x8   SL reverse clams with ball  2x8 B with TA   PPT with TA and SLR 3x8 B   Supine hamstring stretch with rope to assist 3x 10 reps of on/off stretch B           Jean Claude received the following manual therapy techniques: Joint mobilizations, Manual traction, Myofacial release and Soft tissue Mobilization were applied to the: suboccipitals, upper traps, levators for 4 minutes with PA to the R lumbar spine segments L2-L5 grade II      Jean Claude participated in neuromuscular re-education activities to improve: Balance, Coordination, Proprioception and Posture for 8 minutes. The following activities were included:    Exercise 8/12/2020   PPT with Double knees to chest with shoulder from 90 to 0 moving towards extension with theraband to resist 3x3   Supine march with transverse abdominus (TA) and PPT activated 3x10 with cues on rib lowering as well B                               Home Exercises Provided and Patient Education Provided     Education/Self-Care provided: (during therex )   Patient educated on biomechanical justification for therapeutic exercise and importance of compliance with HEP in order to improve overall impairments and QOL    Patient educated on postural awareness and the use of a lumbar roll when in a seated position to reduce stress and maintain optimal alignment of the spine.    Patient educated on the importance of improved core and upper and lower extremity strength in order to improve alignment of the spine and upper and lower extremities with static positions and dynamic movement.    Patient educated on the importance of strong core and lower extremity musculature in order to improve both static and dynamic balance, improve gait mechanics, reduce fall risk and improve household and community mobility.       Written Home Exercises Provided:  Patient instructed to cont prior HEP.  Exercises were reviewed and Jean Claude was able to demonstrate them prior to the end of the session.  Jean Claude demonstrated good  understanding of the education provided.     See EMR under Patient Instructions for exercises provided prior visit.    ASSESSMENT   Pt arrived with improved upright posture and improved stride length, increased cervical rotation with turning (R to 50 degrees and 65 degrees L and extension to 35 degrees). Pt was able to cervical extend to 35 degrees and lumbar extension is now to 12 degrees in standing. Prone trunk extension was at 40% ROM due to L side low back pain but PT used PA mobs on the R grade II for 4 min then extension improved from 20-40% ROM but he is still having L SI joint pain. Pt's R hip abductor is still very weak vs L (3/5 R 4- on the L hip abduction and R hip adduction is 4/5 and L hip adduction was 3-/5). PT continued to focus on core support but he has very poor eccentric control. PT to slowly advance in lumbar ROM and C and thoracic ROM to improve to his PLOF. Pt will be returning to work as a  this week although he is still very weak and needing more rehab to safely begin lifting and sustain postures. PT to focus on extension as he is very weak with this motion in the entire spine as well as hip abduction to advance towards lifting functionally for work. Pt demonstrated good understanding of exercises and required minimal to moderatel cueing to maintain proper form.      Jean Claude is progressing well towards his goals.   Pt prognosis is Excellent.     Pt will continue to benefit from skilled outpatient physical therapy to address the deficits listed in the problem list box on initial evaluation, provide pt/family education and to maximize pt's level of independence in the home and community environment.     Pt's spiritual, cultural and educational needs considered and pt agreeable to plan of care and goals.     Anticipated barriers  to physical therapy: hx of prior PT with limited success and limited consistent compliance    Goals:   Short Term Goals: In 4 weeks 8/27/2020  1.I with HEP  2.Pt to increase lumbar ROM to 20 degrees with flexion. Progressing  3.Pt to increase hamstring AROM to 70 degrees . Progressing  4.Pt to increase B hip abduction and flexion strength by 1/2 grade.  Progressing  5.Pt to have pain less than 7/10 or better at all times. Progressing  6.Pt to improve score on the FOTO by 5%.  7. Pt to be educated on postural/body mechanics awareness.     Long Term Goals: In 8 weeks 9/24/2020  1.Patient to improve score on the FOTO to 32% or better.  2. Patient to demo increase in LE strength to 4/5  3. Patient to have decreased pain to 3/10 or better at worst.  4. Patient to demo increase cervical extension ROM to 40 degrees or better.  5. Patient to increase hip AROM to 100 degrees with flexion.  6.. Patient to perform daily activities including squatting to pick an item up off the floor, reaching overhead and walking for community distances without limitation.  7. Pt to perform shoulder flexion to 155 degree or better.       PLAN   Continue Plan of Care (POC) and progress per patient tolerance.    Becky Light, PT, CIDN, SFMA

## 2020-08-14 ENCOUNTER — CLINICAL SUPPORT (OUTPATIENT)
Dept: REHABILITATION | Facility: HOSPITAL | Age: 50
End: 2020-08-14
Payer: MEDICAID

## 2020-08-14 DIAGNOSIS — M25.561 ACUTE PAIN OF BOTH KNEES: ICD-10-CM

## 2020-08-14 DIAGNOSIS — R53.1 GENERAL WEAKNESS: ICD-10-CM

## 2020-08-14 DIAGNOSIS — R20.2 BILATERAL NUMBNESS AND TINGLING OF ARMS AND LEGS: ICD-10-CM

## 2020-08-14 DIAGNOSIS — M25.562 ACUTE PAIN OF BOTH KNEES: ICD-10-CM

## 2020-08-14 DIAGNOSIS — R20.0 BILATERAL NUMBNESS AND TINGLING OF ARMS AND LEGS: ICD-10-CM

## 2020-08-14 DIAGNOSIS — M54.2 NECK PAIN: ICD-10-CM

## 2020-08-14 DIAGNOSIS — Z74.09 DECREASED FUNCTIONAL MOBILITY AND ENDURANCE: ICD-10-CM

## 2020-08-14 PROCEDURE — 97110 THERAPEUTIC EXERCISES: CPT

## 2020-08-14 PROCEDURE — 97140 MANUAL THERAPY 1/> REGIONS: CPT

## 2020-08-14 PROCEDURE — 97112 NEUROMUSCULAR REEDUCATION: CPT

## 2020-08-14 NOTE — PROGRESS NOTES
Physical Therapy Daily Treatment Note     Name: Jean Claude JORDAN [facility]  Clinic Number: [id number]    Therapy Diagnosis:   Encounter Diagnoses   Name Primary?    Neck pain     Acute pain of both knees     General weakness     Decreased functional mobility and endurance     Bilateral numbness and tingling of arms and legs      Physician: GUTIERREZ Crook MD    Visit Date: 8/14/2020    Physician Orders: PT Eval and Treat   Medical Diagnosis from Referral:   M54.5,G89.29 (ICD-10-CM) - Chronic bilateral low back pain without sciatica   M47.816 (ICD-10-CM) - Spondylosis of lumbar region without myelopathy or radiculopathy   Evaluation Date: 7/30/2020  Authorization Period Expiration: 12/31/2020  Plan of Care Expiration: 9/24/2020    Visit #/Visits authorized: 5/20    Precautions: Standard and Diabetes, HTN, Vitiligo (autoimmune)    Time In: 12:32 pm  Time Out: 1:15 pm  Total Billable Time: 43 minutes    SUBJECTIVE     Today, pt reports: his neck pain continues to improve and R thigh pain has not occurred since the last week.     He was compliant with home exercise program.  Response to previous treatment: less pain in the neck and R LE  Functional change: standing and walking increase R anterior numbness at times in thigh    Pre-Treatment Pain: 6/10 left low back the most  Post-Treatment Pain: 4/10  Location: low back, R shoulder and neck; knees  TREATMENT     Jean Claude received therapeutic exercises to develop strength, endurance, ROM and flexibility for 13 minutes including:    Exercise 8/14/2020   Cervical retraction in supine on pillow then Physioball 3x10 then add rotation 2x10   Posterior pelvic tilt (PPT)  3x10 then add heel slides with PPT 0x10 B   T spine extension on foam 3 min 2 positions       Single knee to chest 3 x 30 sec   Ball squeeze in hooklying squeeze 0x10   PPT and TA with bridges 0x10 with ball squeeze   Prone T 0x10 B   Prone shoulder extension 0x10 B   sidelying hip abduction 0x8 B   Sidelying (SL)  hip adduction L LE 0x8   SL reverse clams with ball  0x8 B with TA   PPT with TA and SLR 0x8 B   Supine hamstring stretch with rope to assist 0x 10 reps of on/off stretch B           Jean Claude received the following manual therapy techniques: Joint mobilizations, Manual traction, Myofacial release and Soft tissue Mobilization were applied to the: L lumbar paraspinals, quadratus lumborum and pectoralis minor on the R shoulder for 15 minutes with PA to the R lumbar spine segments L2-L5 grade II to reduce the mild rotation still noted      Jean Claude participated in neuromuscular re-education activities to improve: Balance, Coordination, Proprioception and Posture for 15 minutes. The following activities were included:    Exercise 8/14/2020   PPT with Double knees to chest with shoulder from 90 to 0 moving towards extension with theraband to resist 0x3   Supine march with transverse abdominus (TA) and PPT activated 3x10 with cues on rib lowering as well B           PPT in R sidelying 3x10   Standing mekhi lateral stretch Limited tolerance 2x3   Prone mekhi 4x5; then add wedge 4x6   Cervical retraction on grey physioball 3x8; then add cervical rotation R/L 3x5   PT to add rotator cuff R UE    Scapular retract/depress R with L cervical rotation and flexion to stretch levator 3x 30 sec    Prone glute lift AAROM 3x8       Home Exercises Provided and Patient Education Provided     Education/Self-Care provided: (during therex )   Patient educated on biomechanical justification for therapeutic exercise and importance of compliance with HEP in order to improve overall impairments and QOL    Patient educated on postural awareness and the use of a lumbar roll when in a seated position to reduce stress and maintain optimal alignment of the spine.    Patient educated on the importance of improved core and upper and lower extremity strength in order to improve alignment of the spine and upper and lower extremities with static  positions and dynamic movement.    Patient educated on the importance of strong core and lower extremity musculature in order to improve both static and dynamic balance, improve gait mechanics, reduce fall risk and improve household and community mobility.       Written Home Exercises Provided: Patient instructed to cont prior HEP.  Exercises were reviewed and Gustavoshantanu was able to demonstrate them prior to the end of the session.  Jean Claude demonstrated good  understanding of the education provided.     See EMR under Patient Instructions for exercises provided prior visit.    ASSESSMENT   Pt arrived Trigger point noted in L L3/4/5 paraspinal muscles and quadratus lumborum with no trigger points in the gluteals today. PT noted mild rotation in lumbar spine notes as a R lumbar rotation at L3/4/5.PT used paloff press to try to pull it out but manual therapy did best with releasing via soft tissue mobilization allowing him to improve from lumbar extension in prone of 10 degrees to 80% by end of session showing good extension gains but the segment was still mildly rotated due to muscle guarding. Pt still has a lot of guarding in those segments keeping the L side in extension more than the R making single leg L hip flexion pain free but R hip flexion painful as standing in hip extension on the L also increases L low back pain. PT attempted standing lateral shifting, prone lateral shifting and sidelying with a towel roll but none were tolerated other than mild R side lying with PPT which opened the R side and mildly closing on the L side. After a lot of soft tissue work, extension was more tolerated but pt is going to possibly benefit from Dry needling to reduce the best to reduce the rotation. PT to assess and see over the next few visits and address as needed. Flexion with L knee reduced pain but only partially. Full flexion increased sharp pain. PT to advance slowly. PT to add more R rotator cuff work as he is weak with  scapular motions of the lower trap and serratus as well as the external rotators.      Jean Claude is progressing well towards his goals.   Pt prognosis is Excellent.     Pt will continue to benefit from skilled outpatient physical therapy to address the deficits listed in the problem list box on initial evaluation, provide pt/family education and to maximize pt's level of independence in the home and community environment.     Pt's spiritual, cultural and educational needs considered and pt agreeable to plan of care and goals.     Anticipated barriers to physical therapy: hx of prior PT with limited success and limited consistent compliance    Goals:   Short Term Goals: In 4 weeks 8/27/2020  1.I with HEP  2.Pt to increase lumbar ROM to 20 degrees with flexion. Progressing  3.Pt to increase hamstring AROM to 70 degrees . Progressing  4.Pt to increase B hip abduction and flexion strength by 1/2 grade.  Progressing  5.Pt to have pain less than 7/10 or better at all times. Progressing  6.Pt to improve score on the FOTO by 5%.  7. Pt to be educated on postural/body mechanics awareness.     Long Term Goals: In 8 weeks 9/24/2020  1.Patient to improve score on the FOTO to 32% or better.  2. Patient to demo increase in LE strength to 4/5  3. Patient to have decreased pain to 3/10 or better at worst.  4. Patient to demo increase cervical extension ROM to 40 degrees or better.  5. Patient to increase hip AROM to 100 degrees with flexion.  6.. Patient to perform daily activities including squatting to pick an item up off the floor, reaching overhead and walking for community distances without limitation.  7. Pt to perform shoulder flexion to 155 degree or better.       PLAN   Continue Plan of Care (POC) and progress per patient tolerance.    Becky Light, PT, CIDN, SFMA

## 2020-08-17 ENCOUNTER — CLINICAL SUPPORT (OUTPATIENT)
Dept: REHABILITATION | Facility: HOSPITAL | Age: 50
End: 2020-08-17
Payer: MEDICAID

## 2020-08-17 DIAGNOSIS — Z74.09 DECREASED FUNCTIONAL MOBILITY AND ENDURANCE: ICD-10-CM

## 2020-08-17 DIAGNOSIS — M54.2 NECK PAIN: ICD-10-CM

## 2020-08-17 DIAGNOSIS — R20.2 BILATERAL NUMBNESS AND TINGLING OF ARMS AND LEGS: ICD-10-CM

## 2020-08-17 DIAGNOSIS — R20.0 BILATERAL NUMBNESS AND TINGLING OF ARMS AND LEGS: ICD-10-CM

## 2020-08-17 DIAGNOSIS — M25.562 ACUTE PAIN OF BOTH KNEES: ICD-10-CM

## 2020-08-17 DIAGNOSIS — M25.561 ACUTE PAIN OF BOTH KNEES: ICD-10-CM

## 2020-08-17 DIAGNOSIS — R53.1 GENERAL WEAKNESS: ICD-10-CM

## 2020-08-17 PROCEDURE — 97110 THERAPEUTIC EXERCISES: CPT

## 2020-08-17 PROCEDURE — 97140 MANUAL THERAPY 1/> REGIONS: CPT

## 2020-08-17 PROCEDURE — 97112 NEUROMUSCULAR REEDUCATION: CPT

## 2020-08-17 NOTE — PROGRESS NOTES
Physical Therapy Daily Treatment Note     Name: Jean Claude JORDAN [facility]  Clinic Number: [id number]    Therapy Diagnosis:   Encounter Diagnoses   Name Primary?    Neck pain     Acute pain of both knees     General weakness     Decreased functional mobility and endurance     Bilateral numbness and tingling of arms and legs      Physician: GUTIERREZ Crook MD    Visit Date: 8/17/2020    Physician Orders: PT Eval and Treat   Medical Diagnosis from Referral:   M54.5,G89.29 (ICD-10-CM) - Chronic bilateral low back pain without sciatica   M47.816 (ICD-10-CM) - Spondylosis of lumbar region without myelopathy or radiculopathy   Evaluation Date: 7/30/2020  Authorization Period Expiration: 12/31/2020  Plan of Care Expiration: 9/24/2020  Last Progress Note:7/30/2020  Last Foto: 7/29/2020    Visit #/Visits authorized: 6/20    Precautions: Standard and Diabetes, HTN, Vitiligo (autoimmune)    Time In: 4:30 pm  Time Out: 5:25 pm  Total Billable Time: 55 minutes    SUBJECTIVE     Today, pt reports: his left low back pain is the greatest problem but he did ok with return to work, today. Pt gets relief with flexion with a posterior pelvic tilt in standing.    He was compliant with home exercise program.  Response to previous treatment: less pain in the neck and R LE  Functional change: standing and walking increase R anterior numbness at times in thigh    Pre-Treatment Pain: 3/10 left low back the most  Post-Treatment Pain: 4/10  Location: low back, R shoulder and neck; knees  TREATMENT     Jean Claude received therapeutic exercises to develop strength, endurance, ROM and flexibility for 15 minutes including:    Exercise 8/17/2020   Cervical retraction in supine on pillow then Physioball 0x10 then add rotation 0x10   Posterior pelvic tilt (PPT)  2x10   T spine extension on foam 0 min 2 positions   Prone UE W 3x8 B   Single knee to chest 0 x 30 sec   Ball squeeze in hooklying squeeze 0x10   PPT and TA with bridges 0x10 with ball squeeze    Prone T 3x10 B thumbs up   Prone shoulder extension 0x10 B   sidelying hip abduction 0x8 B   Sidelying (SL) hip adduction L LE 0x8   SL reverse clams with ball  0x8 B with TA   PPT with TA and SLR 0x8 B   Supine hamstring stretch with rope to assist 0x 10 reps of on/off stretch B   Trunk extension machine 46# 1x20; 66# 1x20; RPE 4   Supine hip rotation L With green theraband 3x10   Standing posterior pelvic tilt 2x10           Jean Claude received the following manual therapy techniques: Joint mobilizations, Manual traction, Myofacial release and Soft tissue Mobilization were applied to the: L lumbar paraspinals, quadratus lumborum and pectoralis minor on the R shoulder for 10 minutes with PA to the R lumbar spine segments L2-L5 grade II to reduce the mild rotation and decreased tone in the erectors and multifidus on the R. Pt may benefit from DN to these segments.      Jean Claude participated in neuromuscular re-education activities to improve: Balance, Coordination, Proprioception and Posture for 30 minutes. The following activities were included:    Exercise 8/17/2020   PPT with Double knees to chest with shoulder from 90 to 0 moving towards extension with theraband to resist 0x3   Supine march with transverse abdominus (TA) and PPT activated 3x10 with cues on rib lowering as well B   1/2 table top L up 6x10 sec; 5x10 sec R       PPT in R sidelying 0x10   Standing mekhi lateral stretch Limited tolerance 0   Prone mekhi 2x10   Cervical retraction on grey physioball 0x8; then add cervical rotation R/L 0x5   PT to add rotator cuff R UE    Scapular retract/depress R with L cervical rotation and flexion to stretch levator 0x 30 sec    Prone glute lift AAROM 0x8   qped TA with alt arms 2x8 B   hooklying ball squeeze with PPT and rib lowering Yellow ball 3x8    R sidelying L hip adduction 3x8 with AAROM to AROM and cues on core support       Home Exercises Provided and Patient Education Provided     Education/Self-Care  provided: (during therex )   Patient educated on biomechanical justification for therapeutic exercise and importance of compliance with HEP in order to improve overall impairments and QOL    Patient educated on postural awareness and the use of a lumbar roll when in a seated position to reduce stress and maintain optimal alignment of the spine.    Patient educated on the importance of improved core and upper and lower extremity strength in order to improve alignment of the spine and upper and lower extremities with static positions and dynamic movement.    Patient educated on the importance of strong core and lower extremity musculature in order to improve both static and dynamic balance, improve gait mechanics, reduce fall risk and improve household and community mobility.       Written Home Exercises Provided: Patient instructed to cont prior HEP.  Exercises were reviewed and Jean Claude was able to demonstrate them prior to the end of the session.  Jean Claude demonstrated good  understanding of the education provided.     See EMR under Patient Instructions for exercises provided prior visit.    ASSESSMENT   Pt arrived without the trigger point noted in L L3/4/5 paraspinal muscles and quadratus lumborum but muscle activity was still difficult to facilitate in the multifidus on this side so PT continued working on tasks to increase L vertebral rotation and may need to use DN to facilitate if we can't get it with therex. Pt kept his 80% extension ROM with Анна since the last visit and nearly was at 100% by the end of the session showing some reduction in hip flexor tension but he is still lagging in L hip adductor strength as well.     Pt R rotator cuff is weak and needing lower trap support as well so PT continued with tasks and at times used AAROM to aide in shoulder flexion. Simple quadraped positioning was showing shakiness in B UE's suggesting functionally weak serratus and rotator cuff muscles. Pt had pain in the  low back with qped alternating arm therex but this improved to only intermittent with L UE reaching when PT cued him to provide more Transverse abdominus and rectus abdominus support. PT to advance as tolerated with therex to increase L2/3/4 muscle activity and multifidus.     Jean Claude is progressing well towards his goals.   Pt prognosis is Excellent.     Pt will continue to benefit from skilled outpatient physical therapy to address the deficits listed in the problem list box on initial evaluation, provide pt/family education and to maximize pt's level of independence in the home and community environment.     Pt's spiritual, cultural and educational needs considered and pt agreeable to plan of care and goals.     Anticipated barriers to physical therapy: hx of prior PT with limited success and limited consistent compliance    Goals:   Short Term Goals: In 4 weeks 8/27/2020  1.I with HEP  2.Pt to increase lumbar ROM to 20 degrees with flexion. Progressing  3.Pt to increase hamstring AROM to 70 degrees . Progressing  4.Pt to increase B hip abduction and flexion strength by 1/2 grade.  Progressing  5.Pt to have pain less than 7/10 or better at all times. Progressing  6.Pt to improve score on the FOTO by 5%.  7. Pt to be educated on postural/body mechanics awareness.     Long Term Goals: In 8 weeks 9/24/2020  1.Patient to improve score on the FOTO to 32% or better.  2. Patient to demo increase in LE strength to 4/5  3. Patient to have decreased pain to 3/10 or better at worst.  4. Patient to demo increase cervical extension ROM to 40 degrees or better.  5. Patient to increase hip AROM to 100 degrees with flexion.  6.. Patient to perform daily activities including squatting to pick an item up off the floor, reaching overhead and walking for community distances without limitation.  7. Pt to perform shoulder flexion to 155 degree or better.       PLAN   Continue Plan of Care (POC) and progress per patient  tolerance.    Becky Light, PT, LORRAINEN, SFMA

## 2020-08-19 ENCOUNTER — CLINICAL SUPPORT (OUTPATIENT)
Dept: REHABILITATION | Facility: HOSPITAL | Age: 50
End: 2020-08-19
Payer: MEDICAID

## 2020-08-19 DIAGNOSIS — Z74.09 DECREASED FUNCTIONAL MOBILITY AND ENDURANCE: ICD-10-CM

## 2020-08-19 DIAGNOSIS — M54.2 NECK PAIN: ICD-10-CM

## 2020-08-19 DIAGNOSIS — M25.561 ACUTE PAIN OF BOTH KNEES: ICD-10-CM

## 2020-08-19 DIAGNOSIS — M25.562 ACUTE PAIN OF BOTH KNEES: ICD-10-CM

## 2020-08-19 DIAGNOSIS — R53.1 GENERAL WEAKNESS: ICD-10-CM

## 2020-08-19 DIAGNOSIS — R20.0 BILATERAL NUMBNESS AND TINGLING OF ARMS AND LEGS: ICD-10-CM

## 2020-08-19 DIAGNOSIS — R20.2 BILATERAL NUMBNESS AND TINGLING OF ARMS AND LEGS: ICD-10-CM

## 2020-08-19 PROCEDURE — 97110 THERAPEUTIC EXERCISES: CPT

## 2020-08-19 PROCEDURE — 97140 MANUAL THERAPY 1/> REGIONS: CPT

## 2020-08-19 PROCEDURE — 97112 NEUROMUSCULAR REEDUCATION: CPT

## 2020-08-19 NOTE — PROGRESS NOTES
Physical Therapy Daily Treatment Note     Name: Jean Claude JORDAN [facility]  Clinic Number: [id number]    Therapy Diagnosis:   Encounter Diagnoses   Name Primary?    Neck pain     Acute pain of both knees     General weakness     Decreased functional mobility and endurance     Bilateral numbness and tingling of arms and legs      Physician: GUTIERREZ Crook MD    Visit Date: 8/19/2020    Physician Orders: PT Eval and Treat   Medical Diagnosis from Referral:   M54.5,G89.29 (ICD-10-CM) - Chronic bilateral low back pain without sciatica   M47.816 (ICD-10-CM) - Spondylosis of lumbar region without myelopathy or radiculopathy   Evaluation Date: 7/30/2020  Authorization Period Expiration: 12/31/2020  Plan of Care Expiration: 9/24/2020  Last Progress Note:7/30/2020  Last Foto: 7/29/2020    Visit #/Visits authorized: 7/20    Precautions: Standard and Diabetes, HTN, Vitiligo (autoimmune)    Time In: 4:27 pm  Time Out: 5:19 pm  Total Billable Time: 52 minutes    SUBJECTIVE     Today, pt reports: Pt states limited tolerance to standing at work yesterday so he went home early. Sitting on the floor is still most comfortable.     He was compliant with home exercise program.  Response to previous treatment: less pain in the neck and R LE  Functional change: standing and walking increase R anterior numbness at times in thigh    Pre-Treatment Pain: 6/10 left low back the most  Post-Treatment Pain: 4/10  Location: low back, R shoulder and neck; knees  TREATMENT     Jean Claude received therapeutic exercises to develop strength, endurance, ROM and flexibility for 15 minutes including:    Exercise 8/19/2020   Cervical retraction in supine on pillow then Physioball 0x10 then add rotation 0x10   Posterior pelvic tilt (PPT)  2x10   T spine extension on foam 0 min 2 positions   Prone UE W 3x8 B   Single knee to chest 0 x 30 sec   Ball squeeze in hooklying squeeze 0x10   PPT and TA with bridges 0x10 with ball squeeze   Prone T 3x10 B thumbs up    Prone shoulder extension 0x10 B   sidelying hip abduction 0x8 B   Sidelying (SL) hip adduction L LE 0x8   SL reverse clams with ball  0x8 B with TA   PPT with TA and SLR 0x8 B   Supine hamstring stretch with rope to assist 0x 10 reps of on/off stretch B   Trunk extension machine 46# 0x20; 66# 0x20; RPE 4   Supine hip rotation L With green theraband 3x10   Standing posterior pelvic tilt 0x10   Trunk flexion machine Attempted but only 10 reps as pain during was sharp    Child pose stretch 3x 30 sec hold       Jean Claude received the following manual therapy techniques: Myofacial release and Soft tissue Mobilization were applied to the:  lumbar paraspinals, gluteals minimum and medius, quadratus lumborum for 13 minutes. PT added DN today to help increase L paraspinal tone in the erectors and multifidus on the L (2 min of needle placement) to the lumbar multifidus, and paraspinal on the L at L2/3.    See EMR under MEDIA for written consent provided 8/19/2020.     Palpation Assessment to determine the necessity for Functional Dry Needling.     Jean Claude participated in neuromuscular re-education activities to improve: Balance, Coordination, Proprioception and Posture for 8 minutes. The following activities were included:    Exercise 8/19/2020   PPT with Double knees to chest with shoulder from 90 to 0 moving towards extension with theraband to resist 0x3   Supine march with transverse abdominus (TA) and PPT activated 3x10 with cues on rib lowering as well B   1/2 table top L up 6x10 sec; 5x10 sec R       PPT in R sidelying 0x10   Standing mekhi lateral stretch Limited tolerance 0   Prone mekhi 0x10   Cervical retraction on grey physioball 0x8; then add cervical rotation R/L 0x5   PT to add rotator cuff R UE    Scapular retract/depress R with L cervical rotation and flexion to stretch levator 0x 30 sec    Prone glute lift AAROM 0x8   qped TA with alt arms 0x8 B   hooklying ball squeeze with PPT and rib lowering Yellow ball  3x8    R sidelying L hip adduction 0x8 with AAROM to AROM and cues on core support     Pt received estim to the L paraspinals with DN placement with NMES on the low frequency and medium setting (3 min each) with 2 needles in the Lumbar muscles and 2 in the gluteal medius and minimus on the L side for  6 min each. Pt declined MH to follow so PT used soft tissue work after DN then therex and ice at the end for 8 minutes supervised.      Home Exercises Provided and Patient Education Provided     Education/Self-Care provided: (during therex )   Patient educated on biomechanical justification for therapeutic exercise and importance of compliance with HEP in order to improve overall impairments and QOL    Patient educated on postural awareness and the use of a lumbar roll when in a seated position to reduce stress and maintain optimal alignment of the spine.    Patient educated on the importance of improved core and upper and lower extremity strength in order to improve alignment of the spine and upper and lower extremities with static positions and dynamic movement.    Patient educated on the importance of strong core and lower extremity musculature in order to improve both static and dynamic balance, improve gait mechanics, reduce fall risk and improve household and community mobility.       Written Home Exercises Provided: Patient instructed to cont prior HEP.  Exercises were reviewed and Jean Claude was able to demonstrate them prior to the end of the session.  Jean Claude demonstrated good  understanding of the education provided.     See EMR under Patient Instructions for exercises provided prior visit.    ASSESSMENT   Pt arrived with the lumbar segment still anterior and rotated. PT used DN today to help activate the lumbar paraspinals to increase their ability to derotate and extend the lumbar spine. PT continued with activities after DN and estim and manual therapy soft tissue work to reduce lumbar extension motions  which pt tends to stay in due to his anterior pelvic tilt and weak core.     Patient demonstrated appropriate response to Functional Dry Needling. fair  rhythmical contractions observed with estim to treated muscle groups of the lumbar spine on the L. PT followed with soft tissue and therex but he will still need PT to release the hip flexors to reduce excessive lumbar extension to reduce pain well and allow the multifidus and paraspinals to activate. Pt would benefit from continued PT to reduce hip flexor guarding, increase core support towards flexion to reduce anterior shearing in the lumbar spine and to open up the facets to reduce pain in the lumbar spine and L sacroiliac joint and to improve L LE standing tolerances as L single leg stand is not tolerated well. PT continued working on improving cervical and thoracic spine activation to reduce forward head and improve UE functional strength as the L UE is still lagging, mildly in rotator cuff support. PT to advance as tolerated with therex to increase L2/3/4 muscle activity and multifidus.     Jean Claude is progressing well towards his goals.   Pt prognosis is Excellent.     Pt will continue to benefit from skilled outpatient physical therapy to address the deficits listed in the problem list box on initial evaluation, provide pt/family education and to maximize pt's level of independence in the home and community environment.     Pt's spiritual, cultural and educational needs considered and pt agreeable to plan of care and goals.     Anticipated barriers to physical therapy: hx of prior PT with limited success and limited consistent compliance    Goals:   Short Term Goals: In 4 weeks 8/27/2020  1.I with HEP  2.Pt to increase lumbar ROM to 20 degrees with flexion. Progressing  3.Pt to increase hamstring AROM to 70 degrees . Progressing  4.Pt to increase B hip abduction and flexion strength by 1/2 grade.  Progressing  5.Pt to have pain less than 7/10 or better at all  times. Progressing  6.Pt to improve score on the FOTO by 5%.  7. Pt to be educated on postural/body mechanics awareness.     Long Term Goals: In 8 weeks 9/24/2020  1.Patient to improve score on the FOTO to 32% or better.  2. Patient to demo increase in LE strength to 4/5  3. Patient to have decreased pain to 3/10 or better at worst.  4. Patient to demo increase cervical extension ROM to 40 degrees or better.  5. Patient to increase hip AROM to 100 degrees with flexion.  6.. Patient to perform daily activities including squatting to pick an item up off the floor, reaching overhead and walking for community distances without limitation.  7. Pt to perform shoulder flexion to 155 degree or better.       PLAN   Continue Plan of Care (POC) and progress per patient tolerance.    Becky Light, PT, CIDN, MA

## 2020-08-20 NOTE — PATIENT INSTRUCTIONS
What To Expect After Functional Dry Needling ® Treatments           How will I feel after a session of FDN?     You may feel some soreness immediately after treatment in the area of the body you were treated. This does not always occur but should be expected and is considered normal. It can also take up to a few hours, or even until the next day, to feel an onset of soreness. The soreness may vary from person to person and based on the area of the body that was treated, but it typically feels like you had an intense workout at the gym. Soreness typically lasts 24-48 hours. Make sure to indicate to your provider at a follow-up appointment how long the soreness lasted.   Bruising from the treatment is possible, somehow uncommon, but it is not of concern. Some areas are more likely to bruise than others, including the shoulders, chest, face, and portions of the extremities. Large bruising rarely occurs, but is possible. Use ice to help decrease the bruising and if you feel concern, please call your provider.    It is common to feel tired/fatigued, energized, emotional, giggly, or out of it after treatment. This is a normal response that can last up to an hour or two after treatment. If this lasts beyond a day, contact your provider as a precaution.   There are times when treatment may actually exacerbate your symptoms. This is normal and may indicate that you need to follow up sooner with your practitioner to continue treatment. If this continues past the 24-48 hour window, keep note of it, as this can help your provider adjust your treatment plan if needed based on your report. This does not mean that FDN cannot help your condition.    What should I do after my treatment and what is recommended?    We highly recommend increasing your water intake for the next 24 hours after treatment to help avoid or reduce soreness. We also recommend soaking in a hot bath or hot tub to help relieve post treatment soreness and  to soften the symptoms associated with the treatment you received. After dry needling treatment, you may do the following based on your comfort level. Please note that if it hurts or exacerbates your symptoms, then discontinuing the activity is best.     Work out and/or stretch.   Participate in normal physical activity.   Massage the area.   Use heat or ice as preferred for post treatment soreness.   Stay hydrated.    If you have prescription medicines, continue to take them as prescribed.    What should I avoid after treatment?     Unfamiliar physical activities or sports.   Doing more than you normally do.   Excessive alcohol intake.     If you are feeling light headed, or experience difficulty breathing, chest pain, or any other concerning symptoms after treatment, call us immediately. If you are unable to get a hold of us, please call your physician.

## 2020-08-21 ENCOUNTER — CLINICAL SUPPORT (OUTPATIENT)
Dept: REHABILITATION | Facility: HOSPITAL | Age: 50
End: 2020-08-21
Payer: MEDICAID

## 2020-08-21 DIAGNOSIS — R20.0 BILATERAL NUMBNESS AND TINGLING OF ARMS AND LEGS: ICD-10-CM

## 2020-08-21 DIAGNOSIS — R53.1 GENERAL WEAKNESS: ICD-10-CM

## 2020-08-21 DIAGNOSIS — Z74.09 DECREASED FUNCTIONAL MOBILITY AND ENDURANCE: ICD-10-CM

## 2020-08-21 DIAGNOSIS — M25.561 ACUTE PAIN OF BOTH KNEES: ICD-10-CM

## 2020-08-21 DIAGNOSIS — M25.562 ACUTE PAIN OF BOTH KNEES: ICD-10-CM

## 2020-08-21 DIAGNOSIS — R20.2 BILATERAL NUMBNESS AND TINGLING OF ARMS AND LEGS: ICD-10-CM

## 2020-08-21 DIAGNOSIS — M54.2 NECK PAIN: ICD-10-CM

## 2020-08-21 PROCEDURE — 97012 MECHANICAL TRACTION THERAPY: CPT

## 2020-08-21 NOTE — PROGRESS NOTES
Physical Therapy Daily Treatment Note     Name: Jean Claude JORDAN [facility]  Clinic Number: [id number]    Therapy Diagnosis:   Encounter Diagnoses   Name Primary?    Neck pain     Acute pain of both knees     General weakness     Decreased functional mobility and endurance     Bilateral numbness and tingling of arms and legs      Physician: GUTIERREZ Crook MD    Visit Date: 8/21/2020    Physician Orders: PT Eval and Treat   Medical Diagnosis from Referral:   M54.5,G89.29 (ICD-10-CM) - Chronic bilateral low back pain without sciatica   M47.816 (ICD-10-CM) - Spondylosis of lumbar region without myelopathy or radiculopathy   Evaluation Date: 7/30/2020  Authorization Period Expiration: 12/31/2020  Plan of Care Expiration: 9/24/2020  Last Progress Note:7/30/2020  Last Foto: 7/29/2020    Visit #/Visits authorized: 8/20    Precautions: Standard and Diabetes, HTN, Vitiligo (autoimmune)    Time In: 2:00 pm  Time Out: 2:35 pm  Total Billable Time: 35 minutes pt had to leave early due to a Dr appt.    SUBJECTIVE     Today, pt reports: Pt states limited tolerance to standing is still a huge issue for him. Pt is forward flexed a bit today. Pt agreed to try traction for pain relief today.     He was compliant with home exercise program.  Response to previous treatment: back pain still elevated on the L  Functional change: numbness is staying away but L low back pain is significant still.   Pre-Treatment Pain: 7/10 left low back the most  Post-Treatment Pain: 3/10  Location: low back, R shoulder and neck; knees  TREATMENT     Jean Claude received therapeutic exercises to develop strength, endurance, ROM and flexibility for 0 minutes including: DEFERRED TODAY    Exercise 8/21/2020   Cervical retraction in supine on pillow then Physioball 0x10 then add rotation 0x10   Posterior pelvic tilt (PPT)  2x10   T spine extension on foam 0 min 2 positions   Prone UE W 3x8 B   Single knee to chest 0 x 30 sec   Ball squeeze in hooklying squeeze 0x10    PPT and TA with bridges 0x10 with ball squeeze   Prone T 3x10 B thumbs up   Prone shoulder extension 0x10 B   sidelying hip abduction 0x8 B   Sidelying (SL) hip adduction L LE 0x8   SL reverse clams with ball  0x8 B with TA   PPT with TA and SLR 0x8 B   Supine hamstring stretch with rope to assist 0x 10 reps of on/off stretch B   Trunk extension machine 46# 0x20; 66# 0x20; RPE 4   Supine hip rotation L With green theraband 3x10   Standing posterior pelvic tilt 0x10   Trunk flexion machine Attempted but only 10 reps as pain during was sharp    Child pose stretch 3x 30 sec hold       Jean Claude received the following manual therapy techniques: Myofacial release and Soft tissue Mobilization were applied to the:  lumbar paraspinals, gluteals minimum and medius, quadratus lumborum for 0 minutes. PT added DN today to help increase L paraspinal tone in the erectors and multifidus on the L (2 min of needle placement) to the lumbar multifidus, and paraspinal on the L at L2/3.    Pt received traction to the lumbar spine from 45 pounds to 85 pounds for 5 sec to 15 sec ramp/hold period for 25 minutes    See EMR under MEDIA for written consent provided 8/19/2020.     Palpation Assessment to determine the necessity for Functional Dry Needling.     Jean Claude participated in neuromuscular re-education activities to improve: Balance, Coordination, Proprioception and Posture for 0 minutes. The following activities were included:  Pt was unable to perform these today due to a Dr appt limiting his session:     Exercise 8/21/2020   PPT with Double knees to chest with shoulder from 90 to 0 moving towards extension with theraband to resist 0x3   Supine march with transverse abdominus (TA) and PPT activated 3x10 with cues on rib lowering as well B   1/2 table top L up 6x10 sec; 5x10 sec R       PPT in R sidelying 0x10   Standing mekhi lateral stretch Limited tolerance 0   Prone mekhi 0x10   Cervical retraction on grey physioball 0x8; then  add cervical rotation R/L 0x5   PT to add rotator cuff R UE    Scapular retract/depress R with L cervical rotation and flexion to stretch levator 0x 30 sec    Prone glute lift AAROM 0x8   qped TA with alt arms 0x8 B   hooklying ball squeeze with PPT and rib lowering Yellow ball 3x8    R sidelying L hip adduction 0x8 with AAROM to AROM and cues on core support     Pt received estim to the L paraspinals with DN placement with NMES on the low frequency and medium setting (3 min each) with 2 needles in the Lumbar muscles and 2 in the gluteal medius and minimus on the L side for  0 min each. Pt declined MH to follow so PT used soft tissue work after DN then therex and ice at the end for 0 minutes supervised.      Home Exercises Provided and Patient Education Provided     Education/Self-Care provided: (during therex )   Patient educated on biomechanical justification for therapeutic exercise and importance of compliance with HEP in order to improve overall impairments and QOL    Patient educated on postural awareness and the use of a lumbar roll when in a seated position to reduce stress and maintain optimal alignment of the spine.    Patient educated on the importance of improved core and upper and lower extremity strength in order to improve alignment of the spine and upper and lower extremities with static positions and dynamic movement.    Patient educated on the importance of strong core and lower extremity musculature in order to improve both static and dynamic balance, improve gait mechanics, reduce fall risk and improve household and community mobility.       Written Home Exercises Provided: Patient instructed to cont prior HEP.  Exercises were reviewed and Jean Claude was able to demonstrate them prior to the end of the session.  Jean Claude demonstrated good  understanding of the education provided.     See EMR under Patient Instructions for exercises provided prior visit.    ASSESSMENT   Pt arrived with pain in lumbar  extension, still limiting upright posture tolerances. PT added traction today to the lumbar spine with pt in a mildly flexed posture to reduce extension tension and help stretch the paraspinals. Pt had less pain and was able to stand upright and walk with less guarding than on arrival. PT to continue with traction on follow up visits to advance pt on therex to increase functional strength and reduce pain. Pt still need core work and cervical/scapular strength to improve overhead work and standing/lifting tolerances as a .    Jean Claude is progressing well towards his goals.   Pt prognosis is Excellent.     Pt will continue to benefit from skilled outpatient physical therapy to address the deficits listed in the problem list box on initial evaluation, provide pt/family education and to maximize pt's level of independence in the home and community environment.     Pt's spiritual, cultural and educational needs considered and pt agreeable to plan of care and goals.     Anticipated barriers to physical therapy: hx of prior PT with limited success and limited consistent compliance    Goals:   Short Term Goals: In 4 weeks 8/27/2020  1.I with HEP  2.Pt to increase lumbar ROM to 20 degrees with flexion. Progressing  3.Pt to increase hamstring AROM to 70 degrees . Progressing  4.Pt to increase B hip abduction and flexion strength by 1/2 grade.  Progressing  5.Pt to have pain less than 7/10 or better at all times. Progressing  6.Pt to improve score on the FOTO by 5%.  7. Pt to be educated on postural/body mechanics awareness.     Long Term Goals: In 8 weeks 9/24/2020  1.Patient to improve score on the FOTO to 32% or better.  2. Patient to demo increase in LE strength to 4/5  3. Patient to have decreased pain to 3/10 or better at worst.  4. Patient to demo increase cervical extension ROM to 40 degrees or better.  5. Patient to increase hip AROM to 100 degrees with flexion.  6.. Patient to perform daily activities including  squatting to pick an item up off the floor, reaching overhead and walking for community distances without limitation.  7. Pt to perform shoulder flexion to 155 degree or better.       PLAN   Continue Plan of Care (POC) and progress per patient tolerance.    Becky Light, PT, CIDN, SFMA

## 2020-08-28 ENCOUNTER — CLINICAL SUPPORT (OUTPATIENT)
Dept: REHABILITATION | Facility: HOSPITAL | Age: 50
End: 2020-08-28
Payer: MEDICAID

## 2020-08-28 DIAGNOSIS — R20.0 BILATERAL NUMBNESS AND TINGLING OF ARMS AND LEGS: ICD-10-CM

## 2020-08-28 DIAGNOSIS — Z74.09 DECREASED FUNCTIONAL MOBILITY AND ENDURANCE: ICD-10-CM

## 2020-08-28 DIAGNOSIS — M25.562 ACUTE PAIN OF BOTH KNEES: ICD-10-CM

## 2020-08-28 DIAGNOSIS — M54.2 NECK PAIN: ICD-10-CM

## 2020-08-28 DIAGNOSIS — R20.2 BILATERAL NUMBNESS AND TINGLING OF ARMS AND LEGS: ICD-10-CM

## 2020-08-28 DIAGNOSIS — M25.561 ACUTE PAIN OF BOTH KNEES: ICD-10-CM

## 2020-08-28 DIAGNOSIS — R53.1 GENERAL WEAKNESS: ICD-10-CM

## 2020-08-28 PROCEDURE — 97012 MECHANICAL TRACTION THERAPY: CPT

## 2020-08-31 PROCEDURE — 95800 PR SLEEP STUDY, UNATTENDED, RECORD HEART RATE/O2 SAT/RESP ANAL/SLEEP TIME: ICD-10-PCS | Mod: 26,S$PBB,, | Performed by: INTERNAL MEDICINE

## 2020-08-31 PROCEDURE — 95800 SLP STDY UNATTENDED: CPT | Mod: 26,S$PBB,, | Performed by: INTERNAL MEDICINE

## 2020-08-31 RX ORDER — GLIMEPIRIDE 4 MG/1
TABLET ORAL
Qty: 60 TABLET | Refills: 0 | Status: SHIPPED | OUTPATIENT
Start: 2020-08-31 | End: 2020-09-03

## 2020-09-02 ENCOUNTER — PROCEDURE VISIT (OUTPATIENT)
Dept: SLEEP MEDICINE | Facility: CLINIC | Age: 50
End: 2020-09-02
Payer: MEDICAID

## 2020-09-02 ENCOUNTER — CLINICAL SUPPORT (OUTPATIENT)
Dept: REHABILITATION | Facility: HOSPITAL | Age: 50
End: 2020-09-02
Payer: MEDICAID

## 2020-09-02 DIAGNOSIS — G47.33 OBSTRUCTIVE SLEEP APNEA SYNDROME: Primary | ICD-10-CM

## 2020-09-02 DIAGNOSIS — Z74.09 DECREASED FUNCTIONAL MOBILITY AND ENDURANCE: ICD-10-CM

## 2020-09-02 DIAGNOSIS — R53.1 GENERAL WEAKNESS: ICD-10-CM

## 2020-09-02 DIAGNOSIS — M25.562 ACUTE PAIN OF BOTH KNEES: ICD-10-CM

## 2020-09-02 DIAGNOSIS — M25.561 ACUTE PAIN OF BOTH KNEES: ICD-10-CM

## 2020-09-02 DIAGNOSIS — R20.2 BILATERAL NUMBNESS AND TINGLING OF ARMS AND LEGS: ICD-10-CM

## 2020-09-02 DIAGNOSIS — M54.2 NECK PAIN: ICD-10-CM

## 2020-09-02 DIAGNOSIS — R20.0 BILATERAL NUMBNESS AND TINGLING OF ARMS AND LEGS: ICD-10-CM

## 2020-09-02 PROCEDURE — 95800 SLP STDY UNATTENDED: CPT | Mod: PBBFAC | Performed by: INTERNAL MEDICINE

## 2020-09-02 PROCEDURE — 97110 THERAPEUTIC EXERCISES: CPT

## 2020-09-02 PROCEDURE — 97112 NEUROMUSCULAR REEDUCATION: CPT

## 2020-09-02 NOTE — Clinical Note
PHYSICIAN INTERPRETATION AND COMMENTS: Findings are consistent with mild/borderline obstructive sleep apnea  (SOPHIE). AHI was 6.0/hr with Spo2 antonino 88.6%. There is insufficient supine study time to assess the severity of positional  obstructive sleep apnea (SOPHIE). Consider CPAP titration or autoPAP 4-20 cm wp, other treatment modalities should be discussed  during consultation referal to sleep disorders clinic. Other therapies may include ENT procedures were appropriate, significant  weight loss.Inspire hypoglossal nerve stimulator and nasal PROVENT or mandibular advancement device may also be  considered.Close follow up to ensure resolution of symptoms.  CLINICAL HISTORY: 50 year old male presented with: 18.5 inch neck, BMI of 37, an [place] sleepiness score of 6, history of  hypertension, diabetes and symptoms of nocturnal snoring and witnessed apneas. Based on the clinical history, the patient has a high  pre-test probability of having severe SOPHIE. STOP-BANG Score = 6

## 2020-09-02 NOTE — PROGRESS NOTES
Physical Therapy Daily Treatment Note and Progress Note     Name: Jean Claude Ocasio  Clinic Number: [id number]    Therapy Diagnosis:   Encounter Diagnoses   Name Primary?    Neck pain     Acute pain of both knees     General weakness     Decreased functional mobility and endurance     Bilateral numbness and tingling of arms and legs      Physician: GUTIERREZ Crook MD    Visit Date: 9/2/2020    Physician Orders: PT Eval and Treat   Medical Diagnosis from Referral:   M54.5,G89.29 (ICD-10-CM) - Chronic bilateral low back pain without sciatica   M47.816 (ICD-10-CM) - Spondylosis of lumbar region without myelopathy or radiculopathy   Evaluation Date: 7/30/2020  Authorization Period Expiration: 12/31/2020  Plan of Care Expiration: 9/24/2020  Last Progress Note:9/2/2020  Last Foto: 7/29/2020    Visit #/Visits authorized: 10/20    Precautions: Standard and Diabetes, HTN, Vitiligo (autoimmune)    Time In: 4:50 pm  Time Out: 5:50 pm  Total Billable Time: 55 minutes    SUBJECTIVE     Today, pt reports: Pt states traction provides relief for only one day.     He was compliant with home exercise program.  Response to previous treatment: back pain still elevated on the L  Functional change: numbness returned some this week.   Pre-Treatment Pain: 6/10 left low back the most  Post-Treatment Pain: 3/10  Location: low back, R shoulder and neck; knees  TREATMENT     Cervical spine AROM in Degrees at eval/today  Flexion           60 /70  Extension       20/40  R side flex      30/35     L side flex      34/40  R rotation       40/70  L rotation       30/65    Lower traps strength was at 2/5 at best on initial eval but 3/5 on L and 2+/5 on R now. Pt  abdominal strength: rectus abdominus, obliques and transverse abdominus 2/5 improved to 2+/5                               Lumbar spine AROM:       Degrees at eval/today Pain Present (Y/N)   Flexion 10/20 n   R side bend 5/15 n   L side bend 5/10 y   Extend 5/15 y      Hip  AROM:       Degrees (R/L) eval;today Pain Present (Y/N)   Hip flex 90/90 improved to 110 Bbut hamstrings improved from 50 B to 70 B today y   Hip Abd 10/10; 20/15    Hip ER (sitting) 30/20;40/30    Hip IR (sitting) 20/20;20/20    Hip Ext (prone) 0/0;5/0             Strength:                          at eval        R          L Today R L              Hip flexors L2                          3/5       3/5  4 4  Quadriceps L234                    3+/5     3+/5      4+ 4+              Hamstrings S1                        3+/5     3/5  4 4              Plantar flexion S1                    2/5       2/5        2+ 2+              Dorsiflexion L4                        4/5       4/5  4 4              Internal rotation                       3/5       3/5  3+ 3+              External rotation                      3/5       3/5  3+ 3+              Hip abduction                          3/5       3/5  3+ 3              Gluteus Medius L45S1           2+/5     2+/5  3+ 3              Gluteus Efrain                    2+/5     2+/5  3+ 3              Great toe extension                4/5       4/5  4+ 4+                        Jean Claude received therapeutic exercises to develop strength, endurance, ROM and flexibility for 40 minutes including:    Exercise 9/2/2020   Cervical retraction in supine on Physioball 4x10 then add rotation 0x10   Posterior pelvic tilt (PPT)  2x10   T spine extension on foam 0 min 2 positions   Prone UE W 0x8 B   Single knee to chest 4 x 30 sec   Ball squeeze in hooklying squeeze 4x10   PPT and TA with bridges 0x10 with ball squeeze   Prone T 3x10 B thumbs up   Prone shoulder extension 0x10 B   sidelying hip abduction 0x8 B   Sidelying (SL) hip adduction L LE 0x8   SL reverse clams with ball  3x8 B with TA   PPT with TA and SLR 2x8 B   Supine hamstring stretch with rope to assist 0x 10 reps of on/off stretch B   Trunk extension machine 46# 0x20; 66# 0x20; RPE 4   Supine hip rotation L With green theraband  3x10   Standing posterior pelvic tilt 2x10   Trunk flexion machine Attempted but only 10 reps as pain during was sharp    Child pose stretch 3x 30 sec hold   Rib lowering with Transverse abdominus activation 3x10   Piriformis and hamstring stretch 3x10   Transverse abdominus activation 3x10 lower TA       Jean Claude did not receive the following manual therapy techniques today: Myofacial release and Soft tissue Mobilization were applied to the:  lumbar paraspinals, gluteals minimum and medius, quadratus lumborum for 0 minutes. PT added DN today to help increase L paraspinal tone in the erectors and multifidus on the L (2 min of needle placement) to the lumbar multifidus, and paraspinal on the L at L2/3.    Pt did not receive traction to the lumbar spine from 85 pounds to 125 pounds for 5 sec to 30 sec ramp/hold period for 30 minutes    See EMR under MEDIA for written consent provided 8/19/2020.     Palpation Assessment to determine the necessity for Functional Dry Needling.     Jean Claude participated in neuromuscular re-education activities to improve: Balance, Coordination, Proprioception and Posture for 15 minutes. The following activities were included:  Pt was unable to perform these today due to a Dr appt limiting his session:     Exercise 9/2/2020   PPT with Double knees to chest with shoulder from 90 to 0 moving towards extension with theraband to resist 0x3   Supine march with transverse abdominus (TA) and PPT activated 3x10 with cues on rib lowering as well B   1/2 table top L up 6x10 sec; 5x10 sec R with exhale focus       PPT in R sidelying 0x10   Standing mekhi lateral stretch Limited tolerance 0   Prone mekhi 0x10   Cervical retraction on grey physioball 0x8; then add cervical rotation R/L 0x5   PT to add rotator cuff R UE    Scapular retract/depress R with L cervical rotation and flexion to stretch levator 0x 30 sec    Prone glute lift AAROM 0x8   qped TA with alt arms 0x8 B   hooklying ball squeeze with  PPT and rib lowering with feet on wall (hips at 90) Yellow ball 3x8    R sidelying L hip adduction 0x8 with AAROM to AROM and cues on core support   Plank at plinth 3x10 then 2x10   1/2 kneeling PPT 2x8 with shoulder flexion as able     Pt received estim to the L paraspinals with DN placement with NMES on the low frequency and medium setting (3 min each) with 2 needles in the Lumbar muscles and 2 in the gluteal medius and minimus on the L side for  0 min each. Pt declined MH to follow so PT used soft tissue work after DN then therex and ice at the end for 0 minutes supervised.      Home Exercises Provided and Patient Education Provided     Education/Self-Care provided: (during therex )   Patient educated on biomechanical justification for therapeutic exercise and importance of compliance with HEP in order to improve overall impairments and QOL    Patient educated on postural awareness and the use of a lumbar roll when in a seated position to reduce stress and maintain optimal alignment of the spine.    Patient educated on the importance of improved core and upper and lower extremity strength in order to improve alignment of the spine and upper and lower extremities with static positions and dynamic movement.    Patient educated on the importance of strong core and lower extremity musculature in order to improve both static and dynamic balance, improve gait mechanics, reduce fall risk and improve household and community mobility.       Written Home Exercises Provided: Patient instructed to cont prior HEP.  Exercises were reviewed and Jean Claude was able to demonstrate them prior to the end of the session.  Jean Claude demonstrated good  understanding of the education provided.     See EMR under Patient Instructions for exercises provided prior visit.    ASSESSMENT   Pt arrived with pain in attempting to cervical retract to neutral as it increases L low back pain, still. PT had pt stand with his back to the wall and pelvis  in a mild posterior pelvic tilt with core activation and he still couldn't move toward cervical neutral due to pain with extension in the thoracic and lumbar spine even when in a neutral position. PT worked on tasks to help stretch the hip flexors that are tight as he is unable to get into pelvis neutral due to an anterior pelvic tilt of 15 degrees from tight hip flexors and a weak core/gluteal complex. PT continued working on tasks to increase core recruitment but pt still had pain rising to stand into full extension although it improved by 20% compared to his initial arrival. Pt is still having intermittent R anterior thigh symptoms which had been under control by the first 2-3 visits initially, so PT will return to his original program to see if we can reduce his symptoms better before adding more core work. PT to also consider more traction to unload the spine. Pt has some symptoms on the R UE that won't clear until he can resolve cervical extension ROM loss which at this time is limited by back pain, more than his mild thoracic extension limitations. PT to continue working on T spine extension and lumbar ROM to advance cervical tolerances. Pt still need core work and cervical/scapular strength to improve overhead work and standing/lifting tolerances as a .    Jean Claude is progressing well towards his goals.   Pt prognosis is Excellent.     Pt will continue to benefit from skilled outpatient physical therapy to address the deficits listed in the problem list box on initial evaluation, provide pt/family education and to maximize pt's level of independence in the home and community environment.     Pt's spiritual, cultural and educational needs considered and pt agreeable to plan of care and goals.     Anticipated barriers to physical therapy: hx of prior PT with limited success and limited consistent compliance    Goals:   Short Term Goals: In 4 weeks 8/27/2020  1.I with HEP Not met  2.Pt to increase lumbar  ROM to 20 degrees with flexion. Met  3.Pt to increase hamstring AROM to 70 degrees . Met  4.Pt to increase B hip abduction and flexion strength by 1/2 grade.  Progressing  5.Pt to have pain less than 7/10 or better at all times. Progressing  6.Pt to improve score on the FOTO by 5%. Not met  7. Pt to be educated on postural/body mechanics awareness. Not met     Long Term Goals: In 8 weeks 9/24/2020  1.Patient to improve score on the FOTO to 32% or better.   2. Patient to demo increase in LE strength to 4/5. Progressing  3. Patient to have decreased pain to 3/10 or better at worst.  4. Patient to demo increase cervical extension ROM to 40 degrees or better. Met so upgraded to 70 degrees  5. Patient to increase hip AROM to 100 degrees with flexion. Met  6.. Patient to perform daily activities including squatting to pick an item up off the floor, reaching overhead and walking for community distances without limitation. Progressing  7. Pt to perform shoulder flexion to 155 degree or better.       PLAN   Continue Plan of Care (POC) and progress per patient tolerance.    Becky Light, PT, CIDN, SFMA

## 2020-09-03 ENCOUNTER — TELEPHONE (OUTPATIENT)
Dept: PULMONOLOGY | Facility: CLINIC | Age: 50
End: 2020-09-03

## 2020-09-03 ENCOUNTER — CLINICAL SUPPORT (OUTPATIENT)
Dept: REHABILITATION | Facility: HOSPITAL | Age: 50
End: 2020-09-03
Payer: MEDICAID

## 2020-09-03 DIAGNOSIS — R20.2 BILATERAL NUMBNESS AND TINGLING OF ARMS AND LEGS: ICD-10-CM

## 2020-09-03 DIAGNOSIS — R53.1 GENERAL WEAKNESS: ICD-10-CM

## 2020-09-03 DIAGNOSIS — M25.561 ACUTE PAIN OF BOTH KNEES: ICD-10-CM

## 2020-09-03 DIAGNOSIS — Z74.09 DECREASED FUNCTIONAL MOBILITY AND ENDURANCE: ICD-10-CM

## 2020-09-03 DIAGNOSIS — R20.0 BILATERAL NUMBNESS AND TINGLING OF ARMS AND LEGS: ICD-10-CM

## 2020-09-03 DIAGNOSIS — M54.2 NECK PAIN: ICD-10-CM

## 2020-09-03 DIAGNOSIS — M25.562 ACUTE PAIN OF BOTH KNEES: ICD-10-CM

## 2020-09-03 PROCEDURE — 97110 THERAPEUTIC EXERCISES: CPT

## 2020-09-03 PROCEDURE — 97140 MANUAL THERAPY 1/> REGIONS: CPT

## 2020-09-03 NOTE — PROCEDURES
Home Sleep Studies    Date/Time: 9/2/2020 8:00 AM  Performed by: Josh Loza MD  Authorized by: GUTIERREZ Crook MD       PHYSICIAN INTERPRETATION AND COMMENTS: Findings are consistent with mild/borderline obstructive sleep apnea  (SOPHIE). AHI was 6.0/hr with Spo2 antonino 88.6%. There is insufficient supine study time to assess the severity of positional  obstructive sleep apnea (SOPHIE). Consider CPAP titration or autoPAP 4-20 cm wp, other treatment modalities should be discussed  during consultation referal to sleep disorders clinic. Other therapies may include ENT procedures were appropriate, significant  weight loss.Inspire hypoglossal nerve stimulator and nasal PROVENT or mandibular advancement device may also be  considered.Close follow up to ensure resolution of symptoms.  CLINICAL HISTORY: 50 year old male presented with: 18.5 inch neck, BMI of 37, an [place] sleepiness score of 6, history of  hypertension, diabetes and symptoms of nocturnal snoring and witnessed apneas. Based on the clinical history, the patient has a high  pre-test probability of having severe SOPHIE. STOP-BANG Score = 6

## 2020-09-03 NOTE — PROGRESS NOTES
Physical Therapy Daily Treatment Note     Name: Jean Claude JORDAN [facility]  Clinic Number: [id number]    Therapy Diagnosis:   Encounter Diagnoses   Name Primary?    Neck pain     Acute pain of both knees     General weakness     Decreased functional mobility and endurance     Bilateral numbness and tingling of arms and legs      Physician: GUTIERREZ Crook MD    Visit Date: 9/3/2020    Physician Orders: PT Eval and Treat   Medical Diagnosis from Referral:   M54.5,G89.29 (ICD-10-CM) - Chronic bilateral low back pain without sciatica   M47.816 (ICD-10-CM) - Spondylosis of lumbar region without myelopathy or radiculopathy   Evaluation Date: 7/30/2020  Authorization Period Expiration: 12/31/2020  Plan of Care Expiration: 9/24/2020  Last Progress Note:9/2/2020  Last Foto: 7/29/2020    Visit #/Visits authorized: 11/20    Precautions: Standard and Diabetes, HTN, Vitiligo (autoimmune)    Time In:  2:30 pm  Time Out: 3:30 pm  Total Billable Time: 60 minutes    SUBJECTIVE     Today, pt reports: Pt states he can only make it through 11 am at work. R sidelying with his legs up and shoulder up helps ease pain the most so it is in a side shift position. PT reassessed pt mechanically today to see what his preferences are based on this.     He was compliant with home exercise program.  Response to previous treatment: back pain still elevated on the L  Functional change: numbness returned some this week.   Pre-Treatment Pain: 5/10 left low back the most  Post-Treatment Pain: 3/10  Location: low back, R shoulder and neck; knees  TREATMENT   Jean Claude received therapeutic exercises to develop strength, endurance, ROM and flexibility for 45 minutes including:  PT attempted to in side shift with rotation, flexion and extension based with neutral being preferred with the L shoulder at the wall for 15 minutes followed by manual therapy for the Cervical and thoracic spine as noted below.  Pt performed supine march R 4x10 L 1x10 with core  activated  L SKC 3x 30 sec  PT then continued with therex including SLR on the R (with the L foot on a stool in supine) then supine march 3x10 each with core facilitated.  cervical retraction in supine on pillow 2x10  Cervical therex after cervical and T spine mobilizations:  Cervical retraction with extension off the plinth 3x10  Cervical extension with EROM rotation 3x10  Prone W 2x10  Cervical retraction in prone 3x10     Mr Ocasio received manual therapy to the cervical and thoracic spine including Grade IV mobs to C7/T1/T2 and grade II-III mobs to T3-9 PA's (15 minutes) with pain on cervical extension and retraction at the R shoulder and base of the neck resolved after Grade IV mobs allowing for freedom of therex movement in the neck with retraction rather than sharp pain at EROM.     Deferred today:    Exercise 9/3/2020   Cervical retraction in supine on Physioball 4x10 then add rotation 0x10   Posterior pelvic tilt (PPT)  2x10   T spine extension on foam 0 min 2 positions   Prone UE W 0x8 B   Single knee to chest 4 x 30 sec   Ball squeeze in hooklying squeeze 4x10   PPT and TA with bridges 0x10 with ball squeeze   Prone T 3x10 B thumbs up   Prone shoulder extension 0x10 B   sidelying hip abduction 0x8 B   Sidelying (SL) hip adduction L LE 0x8   SL reverse clams with ball  3x8 B with TA   PPT with TA and SLR 2x8 B   Supine hamstring stretch with rope to assist 0x 10 reps of on/off stretch B   Trunk extension machine 46# 0x20; 66# 0x20; RPE 4   Supine hip rotation L With green theraband 3x10   Standing posterior pelvic tilt 2x10   Trunk flexion machine Attempted but only 10 reps as pain during was sharp    Child pose stretch 3x 30 sec hold   Rib lowering with Transverse abdominus activation 3x10   Piriformis and hamstring stretch 3x10   Transverse abdominus activation 3x10 lower TA     Pt did not receive traction to the lumbar spine from 85 pounds to 125 pounds for 5 sec to 30 sec ramp/hold period for 30  minutes    See EMR under MEDIA for written consent provided 8/19/2020.     Palpation Assessment to determine the necessity for Functional Dry Needling.     Jean Claude did not participate in neuromuscular re-education activities today: to improve Balance, Coordination, Proprioception and Posture for 0 minutes. The following activities were included:      Exercise 9/3/2020   PPT with Double knees to chest with shoulder from 90 to 0 moving towards extension with theraband to resist 0x3   Supine march with transverse abdominus (TA) and PPT activated 3x10 with cues on rib lowering as well B   1/2 table top L up 6x10 sec; 5x10 sec R with exhale focus       PPT in R sidelying 0x10   Standing mekhi lateral stretch Limited tolerance 0   Prone mekhi 0x10   Cervical retraction on grey physioball 0x8; then add cervical rotation R/L 0x5   PT to add rotator cuff R UE    Scapular retract/depress R with L cervical rotation and flexion to stretch levator 0x 30 sec    Prone glute lift AAROM 0x8   qped TA with alt arms 0x8 B   hooklying ball squeeze with PPT and rib lowering with feet on wall (hips at 90) Yellow ball 3x8    R sidelying L hip adduction 0x8 with AAROM to AROM and cues on core support   Plank at plinth 3x10 then 2x10   1/2 kneeling PPT 2x8 with shoulder flexion as able     Pt received estim to the L paraspinals with DN placement with NMES on the low frequency and medium setting (3 min each) with 2 needles in the Lumbar muscles and 2 in the gluteal medius and minimus on the L side for  0 min each. Pt declined MH to follow so PT used soft tissue work after DN then therex and ice at the end for 0 minutes supervised.      Home Exercises Provided and Patient Education Provided     Education/Self-Care provided: (during therex )   Patient educated on biomechanical justification for therapeutic exercise and importance of compliance with HEP in order to improve overall impairments and QOL    Patient educated on postural  awareness and the use of a lumbar roll when in a seated position to reduce stress and maintain optimal alignment of the spine.    Patient educated on the importance of improved core and upper and lower extremity strength in order to improve alignment of the spine and upper and lower extremities with static positions and dynamic movement.    Patient educated on the importance of strong core and lower extremity musculature in order to improve both static and dynamic balance, improve gait mechanics, reduce fall risk and improve household and community mobility.       Written Home Exercises Provided: Patient instructed to cont prior HEP.  Exercises were reviewed and Jean Claude was able to demonstrate them prior to the end of the session.  Jean Claude demonstrated good  understanding of the education provided.     See EMR under Patient Instructions for exercises provided prior visit.    ASSESSMENT   PT reassessed pt mechanics using the Анна method. Prone extension was note tolerated over 20% ROM whether in figure 4, lateral shift or straight plain extension today without complaints of sharp pain and full flexion caused similar complaints over time. Pt arrived with improved cervical tolerances since the last session but full cervical extension attempts increase L low back pain still. Pt demonstrated that R side lying reduces pain the most so PT had pt work in prone in a lateral shift towards flexion then extension to see if it eased back pain but he had constant pinching on the L side. PT returned him to standing to see if the lateral shift in weight bearing was more tolerated. Pt had better tolerance in neutral but with only a mild shift as more increase low back pain even with flexion or extension or rotation added. PT continued to focus on improving core work as pt tolerated with the L hip in a flexed posture to keep the lumbar spine in more flexion while performing core work to reduce extension moment in the spine as it  increases low back pain. PT will continue to work on traction to unload the lumbar spine over the next few visits and advance mechanics as tolerated.     Jean Claude is progressing well towards his goals.   Pt prognosis is Excellent.     Pt will continue to benefit from skilled outpatient physical therapy to address the deficits listed in the problem list box on initial evaluation, provide pt/family education and to maximize pt's level of independence in the home and community environment.     Pt's spiritual, cultural and educational needs considered and pt agreeable to plan of care and goals.     Anticipated barriers to physical therapy: hx of prior PT with limited success and limited consistent compliance    Goals:   Short Term Goals: In 4 weeks 8/27/2020  1.I with HEP Not met  2.Pt to increase lumbar ROM to 20 degrees with flexion. Met  3.Pt to increase hamstring AROM to 70 degrees . Met  4.Pt to increase B hip abduction and flexion strength by 1/2 grade.  Progressing  5.Pt to have pain less than 7/10 or better at all times. Progressing  6.Pt to improve score on the FOTO by 5%. Not met  7. Pt to be educated on postural/body mechanics awareness. Not met     Long Term Goals: In 8 weeks 9/24/2020  1.Patient to improve score on the FOTO to 32% or better.   2. Patient to demo increase in LE strength to 4/5. Progressing  3. Patient to have decreased pain to 3/10 or better at worst.  4. Patient to demo increase cervical extension ROM to 40 degrees or better. Met so upgraded to 70 degrees  5. Patient to increase hip AROM to 100 degrees with flexion. Met  6.. Patient to perform daily activities including squatting to pick an item up off the floor, reaching overhead and walking for community distances without limitation. Progressing  7. Pt to perform shoulder flexion to 155 degree or better.       PLAN   Continue Plan of Care (POC) and progress per patient tolerance.    Becky Light, PT, CIDN, SFMA

## 2020-09-03 NOTE — TELEPHONE ENCOUNTER
Phoned pt, no answer. Left my chart message    ----- Message from Best Black sent at 9/3/2020 11:14 AM CDT -----  Pt need new pt f/u from home sleep study.    Thanks

## 2020-09-16 ENCOUNTER — CLINICAL SUPPORT (OUTPATIENT)
Dept: REHABILITATION | Facility: HOSPITAL | Age: 50
End: 2020-09-16
Payer: MEDICAID

## 2020-09-16 DIAGNOSIS — M47.816 SPONDYLOSIS OF LUMBAR REGION WITHOUT MYELOPATHY OR RADICULOPATHY: ICD-10-CM

## 2020-09-16 DIAGNOSIS — R53.1 GENERAL WEAKNESS: ICD-10-CM

## 2020-09-16 DIAGNOSIS — M54.50 CHRONIC BILATERAL LOW BACK PAIN WITHOUT SCIATICA: ICD-10-CM

## 2020-09-16 DIAGNOSIS — M54.2 NECK PAIN: Primary | ICD-10-CM

## 2020-09-16 DIAGNOSIS — G89.29 CHRONIC BILATERAL LOW BACK PAIN WITHOUT SCIATICA: ICD-10-CM

## 2020-09-16 PROCEDURE — 97110 THERAPEUTIC EXERCISES: CPT

## 2020-09-16 PROCEDURE — 97140 MANUAL THERAPY 1/> REGIONS: CPT

## 2020-09-16 NOTE — PROGRESS NOTES
"  Physical Therapy Daily Treatment Note     Name: Jean Claude Ocasio  Clinic Number: [id number]    Therapy Diagnosis:   Encounter Diagnoses   Name Primary?    Neck pain Yes    General weakness     Chronic bilateral low back pain without sciatica     Spondylosis of lumbar region without myelopathy or radiculopathy      Physician: GUTIERREZ Crook MD    Visit Date: 9/16/2020    Physician Orders: PT Eval and Treat   Medical Diagnosis from Referral:   M54.5,G89.29 (ICD-10-CM) - Chronic bilateral low back pain without sciatica   M47.816 (ICD-10-CM) - Spondylosis of lumbar region without myelopathy or radiculopathy   Evaluation Date: 7/30/2020  Authorization Period Expiration: 12/31/2020  Plan of Care Expiration: 9/24/2020  Last Progress Note:9/2/2020  Last Foto: 7/29/2020    Visit #/Visits authorized: 11/20    Precautions: Standard and Diabetes, HTN, Vitiligo (autoimmune)    Time In:  4:30 pm  Time Out: 5:25 pm  Total Billable Time: 50 minutes    SUBJECTIVE     Today, pt reports: continued symptoms that are limiting his ability to work and perform ADLs. States he has stopped working because he can't do it. Left lower LSp pain that varies in intensity but can be a sharp at times. He states it is especially bad when sitting or standing for prolonged periods.   Reports numbness in right thigh that comes and goes. Also numbness in right arm in a radial nerve distribution.       He was compliant with home exercise program.  Response to previous treatment: back pain still elevated on the L  Functional change: numbness returned some this week.   Pre-Treatment Pain: 5/10 left low back the most  Post-Treatment Pain: 3/10  Location: low back, R shoulder and neck; knees  TREATMENT   Pre Treatment:   ROM: Pain on ext > flex   Flex to knees     Ext 20%   Right side bend 6 " to knee joint line    Left sidebend  8"   To knee joint line     Positive left quadrant   Normal myotomes   negative but relevant left slump and SLR on left "       Jean Claude received therapeutic exercises to develop strength, endurance, ROM and flexibility for 35 minutes including:  Positional distraction x 5 mins   Neural glides in positional distraction   cervical retraction in supine on pillow 2x10  Palloff press 30# 3 x 15 each direction   Standing pulley row 30# on each 3 x 12   Radial nerve glides   Standing and seated flex/ rotation - assessing for comfort     Mr Ocasio received manual therapy to the cervical and thoracic spine (15 minutes)   Posterior occipital glides supine   Manual traction to CSp   Grade 2-4 lower CSp opening mobs - R>L   Right side glide mobs to lower CSp - supine and in side ly   Gapping and opening LSp mobs to left lower LSp in sidely positional distraction     Deferred today:    Exercise 9/16/2020   Cervical retraction in supine on Physioball 4x10 then add rotation 0x10   Posterior pelvic tilt (PPT)  2x10   T spine extension on foam 0 min 2 positions   Prone UE W 0x8 B   Single knee to chest 4 x 30 sec   Ball squeeze in hooklying squeeze 4x10   PPT and TA with bridges 0x10 with ball squeeze   Prone T 3x10 B thumbs up   Prone shoulder extension 0x10 B   sidelying hip abduction 0x8 B   Sidelying (SL) hip adduction L LE 0x8   SL reverse clams with ball  3x8 B with TA   PPT with TA and SLR 2x8 B   Supine hamstring stretch with rope to assist 0x 10 reps of on/off stretch B   Trunk extension machine 46# 0x20; 66# 0x20; RPE 4   Supine hip rotation L With green theraband 3x10   Standing posterior pelvic tilt 2x10   Trunk flexion machine Attempted but only 10 reps as pain during was sharp    Child pose stretch 3x 30 sec hold   Rib lowering with Transverse abdominus activation 3x10   Piriformis and hamstring stretch 3x10   Transverse abdominus activation 3x10 lower TA     See EMR under MEDIA for written consent provided 8/19/2020.     Jean Claude did not participate in neuromuscular re-education activities today: to improve Balance, Coordination,  Proprioception and Posture for 0 minutes. The following activities were included:      Exercise 9/16/2020   PPT with Double knees to chest with shoulder from 90 to 0 moving towards extension with theraband to resist 0x3   Supine march with transverse abdominus (TA) and PPT activated 3x10 with cues on rib lowering as well B   1/2 table top L up 6x10 sec; 5x10 sec R with exhale focus       PPT in R sidelying 0x10   Standing mekhi lateral stretch Limited tolerance 0   Prone mekhi 0x10   Cervical retraction on grey physioball 0x8; then add cervical rotation R/L 0x5   PT to add rotator cuff R UE    Scapular retract/depress R with L cervical rotation and flexion to stretch levator 0x 30 sec    Prone glute lift AAROM 0x8   qped TA with alt arms 0x8 B   hooklying ball squeeze with PPT and rib lowering with feet on wall (hips at 90) Yellow ball 3x8    R sidelying L hip adduction 0x8 with AAROM to AROM and cues on core support   Plank at plinth 3x10 then 2x10   1/2 kneeling PPT 2x8 with shoulder flexion as able   NO ESTIM TODAY / NO TDN   Pt received estim to the L paraspinals with DN placement with NMES on the low frequency and medium setting (3 min each) with 2 needles in the Lumbar muscles and 2 in the gluteal medius and minimus on the L side for  0 min each. Pt declined MH to follow so PT used soft tissue work after DN then therex and ice at the end for 0 minutes supervised.      Home Exercises Provided and Patient Education Provided     Education/Self-Care provided: (during therex )   Patient educated on biomechanical justification for therapeutic exercise and importance of compliance with HEP in order to improve overall impairments and QOL    Patient educated on postural awareness and the use of a lumbar roll when in a seated position to reduce stress and maintain optimal alignment of the spine.    Patient educated on the importance of improved core and upper and lower extremity strength in order to improve  alignment of the spine and upper and lower extremities with static positions and dynamic movement.    Patient educated on the importance of strong core and lower extremity musculature in order to improve both static and dynamic balance, improve gait mechanics, reduce fall risk and improve household and community mobility.       Written Home Exercises Provided: Patient instructed to cont prior HEP.  Exercises were reviewed and Jean Claude was able to demonstrate them prior to the end of the session.  Jean Claude demonstrated good  understanding of the education provided.     See EMR under Patient Instructions for exercises provided prior visit.    ASSESSMENT     Pt with left lower lumbar facet irritation - likely related to his retrolisthesis and spondylosis. Does better with opening of the left side and trial of positional distraction performed today with good response ( no increased pain) Worked on trunk strengthening in neutral postures.   Cervical motion and symptoms better when upper extremities held unloaded . Will benefit from improved strength. Symptoms in arm appear to be in radial nerve path .     Jean Claude is progressing well towards his goals.   Pt prognosis is Excellent.     Pt will continue to benefit from skilled outpatient physical therapy to address the deficits listed in the problem list box on initial evaluation, provide pt/family education and to maximize pt's level of independence in the home and community environment.     Pt's spiritual, cultural and educational needs considered and pt agreeable to plan of care and goals.     Anticipated barriers to physical therapy: hx of prior PT with limited success and limited consistent compliance    Goals:   Short Term Goals: In 4 weeks 8/27/2020  1.I with HEP Not met  2.Pt to increase lumbar ROM to 20 degrees with flexion. Met  3.Pt to increase hamstring AROM to 70 degrees . Met  4.Pt to increase B hip abduction and flexion strength by 1/2 grade.  Progressing  5.Pt to  have pain less than 7/10 or better at all times. Progressing  6.Pt to improve score on the FOTO by 5%. Not met  7. Pt to be educated on postural/body mechanics awareness. Not met     Long Term Goals: In 8 weeks 9/24/2020  1.Patient to improve score on the FOTO to 32% or better.   2. Patient to demo increase in LE strength to 4/5. Progressing  3. Patient to have decreased pain to 3/10 or better at worst.  4. Patient to demo increase cervical extension ROM to 40 degrees or better. Met so upgraded to 70 degrees  5. Patient to increase hip AROM to 100 degrees with flexion. Met  6.. Patient to perform daily activities including squatting to pick an item up off the floor, reaching overhead and walking for community distances without limitation. Progressing  7. Pt to perform shoulder flexion to 155 degree or better.       PLAN   Continue Plan of Care (POC) and progress per patient tolerance.    Gopal Crane, PT

## 2020-09-22 ENCOUNTER — OFFICE VISIT (OUTPATIENT)
Dept: ENDOCRINOLOGY | Facility: CLINIC | Age: 50
End: 2020-09-22
Payer: MEDICAID

## 2020-09-22 ENCOUNTER — CLINICAL SUPPORT (OUTPATIENT)
Dept: REHABILITATION | Facility: HOSPITAL | Age: 50
End: 2020-09-22
Payer: MEDICAID

## 2020-09-22 ENCOUNTER — LAB VISIT (OUTPATIENT)
Dept: LAB | Facility: HOSPITAL | Age: 50
End: 2020-09-22
Attending: FAMILY MEDICINE
Payer: MEDICAID

## 2020-09-22 ENCOUNTER — OFFICE VISIT (OUTPATIENT)
Dept: INTERNAL MEDICINE | Facility: CLINIC | Age: 50
End: 2020-09-22
Payer: MEDICAID

## 2020-09-22 VITALS
RESPIRATION RATE: 18 BRPM | HEART RATE: 78 BPM | BODY MASS INDEX: 38.22 KG/M2 | DIASTOLIC BLOOD PRESSURE: 89 MMHG | WEIGHT: 297.81 LBS | SYSTOLIC BLOOD PRESSURE: 146 MMHG | HEIGHT: 74 IN

## 2020-09-22 VITALS
BODY MASS INDEX: 38.2 KG/M2 | DIASTOLIC BLOOD PRESSURE: 60 MMHG | SYSTOLIC BLOOD PRESSURE: 130 MMHG | HEART RATE: 97 BPM | HEIGHT: 74 IN | TEMPERATURE: 97 F | OXYGEN SATURATION: 98 % | WEIGHT: 297.63 LBS

## 2020-09-22 DIAGNOSIS — G89.29 CHRONIC BILATERAL LOW BACK PAIN WITHOUT SCIATICA: ICD-10-CM

## 2020-09-22 DIAGNOSIS — R53.1 GENERAL WEAKNESS: ICD-10-CM

## 2020-09-22 DIAGNOSIS — Z74.09 DECREASED FUNCTIONAL MOBILITY AND ENDURANCE: ICD-10-CM

## 2020-09-22 DIAGNOSIS — R20.0 BILATERAL NUMBNESS AND TINGLING OF ARMS AND LEGS: Chronic | ICD-10-CM

## 2020-09-22 DIAGNOSIS — R20.2 BILATERAL NUMBNESS AND TINGLING OF ARMS AND LEGS: Chronic | ICD-10-CM

## 2020-09-22 DIAGNOSIS — R74.01 ELEVATED TRANSAMINASE LEVEL: ICD-10-CM

## 2020-09-22 DIAGNOSIS — E11.42 TYPE 2 DIABETES MELLITUS WITH DIABETIC POLYNEUROPATHY, WITHOUT LONG-TERM CURRENT USE OF INSULIN: Chronic | ICD-10-CM

## 2020-09-22 DIAGNOSIS — R73.09 ELEVATED HEMOGLOBIN A1C: ICD-10-CM

## 2020-09-22 DIAGNOSIS — Z86.19 HISTORY OF HELICOBACTER PYLORI INFECTION: ICD-10-CM

## 2020-09-22 DIAGNOSIS — M25.561 ACUTE PAIN OF BOTH KNEES: ICD-10-CM

## 2020-09-22 DIAGNOSIS — M54.2 NECK PAIN: ICD-10-CM

## 2020-09-22 DIAGNOSIS — M54.50 CHRONIC BILATERAL LOW BACK PAIN WITHOUT SCIATICA: ICD-10-CM

## 2020-09-22 DIAGNOSIS — E11.65 UNCONTROLLED TYPE 2 DIABETES MELLITUS WITH HYPERGLYCEMIA: Primary | ICD-10-CM

## 2020-09-22 DIAGNOSIS — I10 ESSENTIAL HYPERTENSION: Primary | Chronic | ICD-10-CM

## 2020-09-22 DIAGNOSIS — M25.562 ACUTE PAIN OF BOTH KNEES: ICD-10-CM

## 2020-09-22 DIAGNOSIS — E66.01 CLASS 2 SEVERE OBESITY DUE TO EXCESS CALORIES WITH SERIOUS COMORBIDITY AND BODY MASS INDEX (BMI) OF 38.0 TO 38.9 IN ADULT: ICD-10-CM

## 2020-09-22 LAB
ALBUMIN SERPL BCP-MCNC: 4 G/DL (ref 3.5–5.2)
ALP SERPL-CCNC: 63 U/L (ref 55–135)
ALT SERPL W/O P-5'-P-CCNC: 58 U/L (ref 10–44)
AST SERPL-CCNC: 46 U/L (ref 10–40)
BILIRUB DIRECT SERPL-MCNC: 0.2 MG/DL (ref 0.1–0.3)
BILIRUB SERPL-MCNC: 0.4 MG/DL (ref 0.1–1)
GGT SERPL-CCNC: 47 U/L (ref 8–55)
GLUCOSE SERPL-MCNC: 188 MG/DL (ref 70–110)
PROT SERPL-MCNC: 7.5 G/DL (ref 6–8.4)

## 2020-09-22 PROCEDURE — 99999 PR PBB SHADOW E&M-EST. PATIENT-LVL III: CPT | Mod: PBBFAC,,, | Performed by: FAMILY MEDICINE

## 2020-09-22 PROCEDURE — 99214 PR OFFICE/OUTPT VISIT, EST, LEVL IV, 30-39 MIN: ICD-10-PCS | Mod: S$PBB,,, | Performed by: FAMILY MEDICINE

## 2020-09-22 PROCEDURE — 99214 OFFICE O/P EST MOD 30 MIN: CPT | Mod: S$PBB,,, | Performed by: INTERNAL MEDICINE

## 2020-09-22 PROCEDURE — 36415 COLL VENOUS BLD VENIPUNCTURE: CPT

## 2020-09-22 PROCEDURE — 99214 PR OFFICE/OUTPT VISIT, EST, LEVL IV, 30-39 MIN: ICD-10-PCS | Mod: S$PBB,,, | Performed by: INTERNAL MEDICINE

## 2020-09-22 PROCEDURE — 83036 HEMOGLOBIN GLYCOSYLATED A1C: CPT

## 2020-09-22 PROCEDURE — 99213 OFFICE O/P EST LOW 20 MIN: CPT | Mod: PBBFAC,27 | Performed by: FAMILY MEDICINE

## 2020-09-22 PROCEDURE — 82977 ASSAY OF GGT: CPT

## 2020-09-22 PROCEDURE — 80076 HEPATIC FUNCTION PANEL: CPT

## 2020-09-22 PROCEDURE — 82962 GLUCOSE BLOOD TEST: CPT | Mod: PBBFAC | Performed by: INTERNAL MEDICINE

## 2020-09-22 PROCEDURE — 80074 ACUTE HEPATITIS PANEL: CPT

## 2020-09-22 PROCEDURE — 99999 PR PBB SHADOW E&M-EST. PATIENT-LVL III: ICD-10-PCS | Mod: PBBFAC,,, | Performed by: FAMILY MEDICINE

## 2020-09-22 PROCEDURE — 99999 PR PBB SHADOW E&M-EST. PATIENT-LVL III: ICD-10-PCS | Mod: PBBFAC,,, | Performed by: INTERNAL MEDICINE

## 2020-09-22 PROCEDURE — 99214 OFFICE O/P EST MOD 30 MIN: CPT | Mod: S$PBB,,, | Performed by: FAMILY MEDICINE

## 2020-09-22 PROCEDURE — 97110 THERAPEUTIC EXERCISES: CPT

## 2020-09-22 PROCEDURE — 99999 PR PBB SHADOW E&M-EST. PATIENT-LVL III: CPT | Mod: PBBFAC,,, | Performed by: INTERNAL MEDICINE

## 2020-09-22 PROCEDURE — 99213 OFFICE O/P EST LOW 20 MIN: CPT | Mod: PBBFAC | Performed by: INTERNAL MEDICINE

## 2020-09-22 RX ORDER — GLIMEPIRIDE 4 MG/1
TABLET ORAL
Qty: 60 TABLET | Refills: 3 | Status: SHIPPED | OUTPATIENT
Start: 2020-09-22 | End: 2021-01-12

## 2020-09-22 RX ORDER — LINAGLIPTIN 5 MG/1
5 TABLET, FILM COATED ORAL DAILY
Qty: 30 TABLET | Refills: 5 | Status: SHIPPED | OUTPATIENT
Start: 2020-09-22 | End: 2020-12-21

## 2020-09-22 RX ORDER — LIRAGLUTIDE 6 MG/ML
0.6 INJECTION SUBCUTANEOUS DAILY
Qty: 3 ML | Refills: 1 | Status: SHIPPED | OUTPATIENT
Start: 2020-09-22 | End: 2020-12-10

## 2020-09-22 RX ORDER — PEN NEEDLE, DIABETIC 30 GX3/16"
NEEDLE, DISPOSABLE MISCELLANEOUS
Qty: 100 EACH | Refills: 2 | Status: SHIPPED | OUTPATIENT
Start: 2020-09-22 | End: 2021-07-09 | Stop reason: SDUPTHER

## 2020-09-22 RX ORDER — METFORMIN HYDROCHLORIDE 1000 MG/1
TABLET ORAL
Qty: 180 TABLET | Refills: 0 | Status: SHIPPED | OUTPATIENT
Start: 2020-09-22 | End: 2020-12-21 | Stop reason: SDUPTHER

## 2020-09-22 NOTE — PROGRESS NOTES
Self referred   Patient ID: Jean Claude Ocasio is a 50 y.o. male.    Chief Complaint:    HPI  DM diagnosed: about 9 years ago  Off Januvia 50 mg  On Tradjenta qd  Metformin 1000 mg bid  Glimperide 4 mg BID ( patient increase the dose to twice a day , and he quit using Victoza)    ---------------------------------  The following is a copy from last visit note;  PATIENT QUIT TAKING INSULIN INJECTIONS FEW DAYS AFTER LAST VISIT BECAUSE  HE WAS NOT FEELING WELL AND HE FELT LIKE A DIFFERENT PERSON, AND EVEN HIS  WIFE ASKED HIM WHAT WAS WRONG WITH HIM.  HIS BLOOD SUGAR PROBABLY WAS  IN 80S SOMETIMES WHEN HE WAS ON INSULIN.  IT IS NOT CLEAR IF HE HAD HYPOGLYCEMIA  HE DECIDED TO STOP THE INSULIN AND TO TAKE ONLY METFORMIN AND  HE STARTED TAKING GLYBURIDE 4 MG TWICE A DAY PER PATIENT  IT IS NOT CLEAR IF HE MEANS GLIMEPIRIDE INSTEAD OF GLYBURIDE  HIS BLOOD SUGAR RECENTLY HAS BEEN 100-170  NO CBG LOG AVAILABLE  --------------------------------    CBgs  CBG was 150 in the morning then 10:00 a.m. before breakfast -250  Kidney problem: no  Pain or numbness in feet: no  Complaining of chronic back pain and inability to lift stuff and difficulty in bending  Some pain sometimes in legs and with fatigue, sometimes numbness in right forearm  Off work in the last several months during corona pandemic  Patient brought a form for disability to be filled    No complaints of  dysphagia, n/v, cp, sob, abd pain, rash, or edema.     No FH of pancreatitis, or thyroid cancer        Patient is a .  Social History     Socioeconomic History    Marital status:      Spouse name: Not on file    Number of children: Not on file    Years of education: Not on file    Highest education level: Not on file   Occupational History    Not on file   Social Needs    Financial resource strain: Not on file    Food insecurity     Worry: Not on file     Inability: Not on file    Transportation needs     Medical: Not on file      Non-medical: Not on file   Tobacco Use    Smoking status: Former Smoker   Substance and Sexual Activity    Alcohol use: Never     Frequency: Never    Drug use: Never    Sexual activity: Yes     Partners: Female   Lifestyle    Physical activity     Days per week: Not on file     Minutes per session: Not on file    Stress: Not on file   Relationships    Social connections     Talks on phone: Not on file     Gets together: Not on file     Attends Judaism service: Not on file     Active member of club or organization: Not on file     Attends meetings of clubs or organizations: Not on file     Relationship status: Not on file   Other Topics Concern    Not on file   Social History Narrative    Not on file       ROS:   + Diabetes   No complaints of chest pain shortness breath  No complaints of nausea or vomiting  Chronic lower back pain  Difficulty and pain when lifting something  No ulcers in feet    PE:  Vitals:    09/22/20 1042   BP: (!) 146/89   Pulse: 78   Resp: 18     Alert and oriented  No acute distress  + Obesity  Diffuse vitiligo in different areas of the body  Body mass index is 38.24 kg/m².  No Proptosis or conjunctivitis  No goitre by inspection  Thyroid gland is not palpable  Heart reg, no gallop  Lungs cta, no wheezing  No edema in lower legs  Speech normal  Behavior normal  No tremor    Lab Reviewed.    A/P  Type 2 diabetes mellitus with hyperglycemia  Uncontrolled diabetes  Elevated A1c  HTN  OFF INSULIN SEC TO SIDE EFFECTS   Tradjenta one tablet daily  Victoza 0.6 mg inj prescribed again today.  The main side effect and contraindications reviewed with the patient.   Glimperide once a day after restarting on Victoza   Metformin twice a day.    Hypoglycemia side effect from glimepiride discussed.     Chronic lower back pain  A form for info on disability is filled by me.    Orders Placed This Encounter   Procedures    POCT Glucose, Hand-Held Device       Follow-up visit in 3 months.      Patient  understands and agrees on the plan.

## 2020-09-22 NOTE — ASSESSMENT & PLAN NOTE
Persistent. He feels that it is disabling. He reports no fever, sweats, involuntary weight loss, loss of lower extremity strength or sensation, saddle anesthesia, or incontinence of urine or feces.

## 2020-09-22 NOTE — ASSESSMENT & PLAN NOTE
Asymptomatic. However, his 6 year old daughter recently tested positive for H. Pylori, and her doctor instructed that her parents need testing. He says they do breath testing at [facility].

## 2020-09-22 NOTE — PROGRESS NOTES
CHIEF COMPLAINT  Follow-up    This is my first time treating him here. All problems addressed today are NEW TO ME.  Jena Claude is requesting that I take over as his primary care provider.     DIAGNOSES, HISTORY, ASSESSMENT, AND PLAN  Assessment   1. Essential hypertension    2. Type 2 diabetes mellitus with diabetic polyneuropathy, without long-term current use of insulin    3. Elevated transaminase level    4. Class 2 severe obesity due to excess calories with serious comorbidity and body mass index (BMI) of 38.0 to 38.9 in adult    5. History of Helicobacter pylori infection    6. Chronic bilateral low back pain without sciatica    7. Bilateral numbness and tingling of arms and legs         Orders Placed This Encounter    US Abdomen Limited    Hemoglobin A1C    Hepatic function panel    HEPATITIS PANEL, ACUTE    Gamma GT       Except as noted herein, any chronic conditions are compensated/controlled and stable, and no other significant new complaints or concerns reported.     Plan   Problem List Items Addressed This Visit     Essential hypertension - Primary (Chronic)    RESOLVED: Bilateral numbness and tingling of arms and legs (Chronic)    Current Assessment & Plan     EMG W/ ULTRASOUND AND NERVE CONDUCTION TEST 4 Extremities [NEU64] (Order [id number])         Type 2 diabetes mellitus with diabetic polyneuropathy, without long-term current use of insulin (Chronic)    Relevant Orders    Hemoglobin A1C (Completed)    Chronic bilateral low back pain without sciatica    Overview     X-ray Lumbar Spine December, 2019: Dextroscoliosis of the lumbar spine is noted.  There is minimal grade 1 retrolisthesis of L2 on L3 and L3 on L4.  No fracture is seen.  Degenerative changes are noted with spurring of the endplates and lower lumbar facet arthropathy.  Mild disc space narrowing at L2-L3.         Current Assessment & Plan     Persistent. He feels that it is disabling. He reports no fever, sweats, involuntary weight loss,  "loss of lower extremity strength or sensation, saddle anesthesia, or incontinence of urine or feces.         History of Helicobacter pylori infection    Current Assessment & Plan     Asymptomatic. However, his 6 year old daughter recently tested positive for H. Pylori, and her doctor instructed that her parents need testing. He says they do breath testing at [facility].         Class 2 severe obesity due to excess calories with serious comorbidity and body mass index (BMI) of 38.0 to 38.9 in adult    Elevated transaminase level    Relevant Orders    Hepatic function panel (Completed)    HEPATITIS PANEL, ACUTE (Completed)    Gamma GT (Completed)    US Abdomen Limited (Completed)          Outpatient Medications Prior to Visit   Medication Sig Dispense Refill    aspirin (ADULT LOW DOSE ASPIRIN) 81 MG EC tablet 1 tablet(s) by mouth daily      baclofen (LIORESAL) 10 MG tablet Take 1 tablet (10 mg total) by mouth 3 (three) times daily as needed. 90 tablet 11    blood-glucose meter kit Use as instructed 1 each 0    glimepiride (AMARYL) 4 MG tablet 1-2 tab a day as needed 60 tablet 3    lancets 30 gauge Misc qid 150 each 5    linaGLIPtin (TRADJENTA) 5 mg Tab tablet Take 1 tablet (5 mg total) by mouth once daily. 30 tablet 5    liraglutide 0.6 mg/0.1 mL, 18 mg/3 mL, subq PNIJ (VICTOZA 2-PAUL) 0.6 mg/0.1 mL (18 mg/3 mL) PnIj pen Inject 0.6 mg into the skin once daily. 3 mL 1    lisinopril (PRINIVIL,ZESTRIL) 20 MG tablet Take 1 tablet (20 mg total) by mouth once daily. 90 tablet 4    loratadine (CLARITIN) 10 mg tablet 1 tablet      meloxicam (MOBIC) 15 MG tablet Take 1 tablet (15 mg total) by mouth once daily. 30 tablet 0    metFORMIN (GLUCOPHAGE) 1000 MG tablet Take 1 tablet by mouth twice daily 180 tablet 0    pen needle, diabetic 31 gauge x 5/16" Ndle Qd for Victoza 100 each 2    simvastatin (ZOCOR) 20 MG tablet 1 tablet(s) by mouth daily      ACCU-CHEK GUIDE TEST STRIPS Strp USE 1 TEST STRIP TO TEST " "BLOOD SUGAR 4 TIMES DAILY. 150 each 0     No facility-administered medications prior to visit.         There are no discontinued medications.          Subjective   Review of Systems   Constitutional: Negative for chills and fever.   Respiratory: Negative for chest tightness and shortness of breath.    Endocrine: Negative for polydipsia and polyuria.        Objective   Vitals:    09/22/20 1206   BP: 130/60   BP Location: Left arm   Patient Position: Sitting   BP Method: Large (Manual)   Pulse: 97   Temp: 97.4 °F (36.3 °C)   TempSrc: Tympanic   SpO2: 98%   Weight: 135 kg (297 lb 9.9 oz)   Height: 6' 2" (1.88 m)     Physical Exam  Vitals signs reviewed.   Constitutional:       General: He is not in acute distress.     Appearance: Normal appearance. He is not ill-appearing or diaphoretic.   Eyes:      General: No scleral icterus.     Conjunctiva/sclera: Conjunctivae normal.   Neck:      Musculoskeletal: No muscular tenderness.      Vascular: No carotid bruit.   Cardiovascular:      Rate and Rhythm: Normal rate and regular rhythm.      Heart sounds: Normal heart sounds.   Pulmonary:      Effort: Pulmonary effort is normal. No respiratory distress.      Breath sounds: Normal breath sounds. No wheezing or rhonchi.   Abdominal:      General: Bowel sounds are normal. There is no distension.      Palpations: Abdomen is soft. There is no mass.      Tenderness: There is no abdominal tenderness.   Lymphadenopathy:      Cervical: No cervical adenopathy.   Skin:     General: Skin is warm and dry.      Coloration: Skin is not jaundiced.   Neurological:      General: No focal deficit present.      Mental Status: He is alert and oriented to person, place, and time.   Psychiatric:         Mood and Affect: Mood normal.         Behavior: Behavior normal.         Judgment: Judgment normal.           Documentation entered by me for this encounter may have been done in part using speech-recognition technology. Although I have made an " "effort to ensure accuracy, "sound like" errors may exist and should be interpreted in context. -GUTIERREZ Crook MD.    "

## 2020-09-22 NOTE — LETTER
PHYSICIAN ORDERS    PATIENT IDENTIFYING INFORMATION:  Jean Claude Ocasio  [address]  [facility] [address] YOB: 1970  OUR MRN: [id number]   Home Phone [phone]   Work Phone Not on file.   Mobile [phone]        ORDER DATE: 09/22/2020    ORDERS  1. H. Pylori Urea Breath Testing    SUPPORTING DIAGNOSES  History of Helicobacter pylori infection (ICD-10 Z86.19)         GUTIERREZ Crook MD, NPI: [id number]  ------------------------------------------------------------------------------------------------  FAX AND MAIL RESULTS TO:    GUTIERREZ Crook MD  [facility] [address] [place] [address]  PH: [phone]  FX: [phone]

## 2020-09-22 NOTE — PROGRESS NOTES
Physical Therapy Daily Treatment Note     [facility]  Clinic Number: [id number]    Therapy Diagnosis:   Encounter Diagnoses   Name Primary?    Bilateral numbness and tingling of arms and legs     Type 2 diabetes mellitus with diabetic polyneuropathy, without long-term current use of insulin     Neck pain     Acute pain of both knees     General weakness     Decreased functional mobility and endurance      Physician: GUTIERREZ Crook MD    Visit Date: 9/22/2020    Physician Orders: PT Eval and Treat   Medical Diagnosis from Referral:   M54.5,G89.29 (ICD-10-CM) - Chronic bilateral low back pain without sciatica   M47.816 (ICD-10-CM) - Spondylosis of lumbar region without myelopathy or radiculopathy   Evaluation Date: 7/30/2020  Authorization Period Expiration: 12/31/2020  Plan of Care Expiration: 9/24/2020  Last Progress Note:9/2/2020  Last Foto: 7/29/2020    Visit #/Visits authorized: 11/20    Precautions: Standard and Diabetes, HTN, Vitiligo (autoimmune)    Time In:  9:10 am  Time Out: 10:10 pm  Total Billable Time: 55 minutes    SUBJECTIVE     Today, pt reports: Pt reports he did his HEP a little but he got a headache on his neck exercises. Pt lost his HEP.    He was compliant with home exercise program.  Response to previous treatment: back pain still elevated on the L  Functional change: numbness returned some this week.   Pre-Treatment Pain: 5/10 left low back the most  Post-Treatment Pain: 3/10  Location: low back, R shoulder and neck; knees  TREATMENT     Jean Claude received therapeutic exercises to develop strength, endurance, ROM and flexibility for 45 minutes including:  Side lying hip abduction with roll under ribs 3x10 B with Transverse abdominus (TA) hold  Side lying shoulder abduction stretch 3 min each side with towel roll under ribs  For positional distraction  Supine march with rib lowering 3x10 each leg  1/2 table hold with ribs lowered 3x10 sec hold  Isometric ball press in hook  lying (arms press onto physioball through to legs) with mild crunch 3x10  Assisted quadraped with physioball under stomach with alternating arm flexion 3x 10 then alternating leg 2x5  hooklying leg lift and hold (into full knee extension with hip at 80 flexion) 5 sec hold (for pre hollow man work) 2x 5 reps  Prone mekhi extension 3x5  Pre side plank isometric push 2x5 (can't lift hip off bed yet)  Prone hip extension 1x10  Neural glides in positional distraction 2x5 B in UEs and LEs    Deferred  cervical retraction in supine on pillow 2x10  Palloff press 30# 3 x 15 each direction   Standing pulley row 30# on each 3 x 12   Radial nerve glides   Standing and seated flex/ rotation - assessing for comfort     Mr Ocasio received manual therapy to the cervical and thoracic spine (0 minutes)   Posterior occipital glides supine   Manual traction to CSp   Grade 2-4 lower CSp opening mobs - R>L   Right side glide mobs to lower CSp - supine and in side ly   Gapping and opening LSp mobs to left lower LSp in sidely positional distraction     Deferred today:    Exercise 9/22/2020   Cervical retraction in supine on Physioball 4x10 then add rotation 0x10   Posterior pelvic tilt (PPT)  2x10   T spine extension on foam 0 min 2 positions   Prone UE W 0x8 B   Single knee to chest 4 x 30 sec   Ball squeeze in hooklying squeeze 4x10   PPT and TA with bridges 0x10 with ball squeeze   Prone T 3x10 B thumbs up   Prone shoulder extension 0x10 B   sidelying hip abduction 0x8 B   Sidelying (SL) hip adduction L LE 0x8   SL reverse clams with ball  3x8 B with TA   PPT with TA and SLR 2x8 B   Supine hamstring stretch with rope to assist 0x 10 reps of on/off stretch B   Trunk extension machine 46# 0x20; 66# 0x20; RPE 4   Supine hip rotation L With green theraband 3x10   Standing posterior pelvic tilt 2x10   Trunk flexion machine Attempted but only 10 reps as pain during was sharp    Child pose stretch 3x 30 sec hold   Rib lowering with  Transverse abdominus activation 3x10   Piriformis and hamstring stretch 3x10   Transverse abdominus activation 3x10 lower TA     See EMR under MEDIA for written consent provided 8/19/2020.     Jean Claude did not participate in neuromuscular re-education activities today: to improve Balance, Coordination, Proprioception and Posture for 0 minutes. The following activities were included:      Exercise 9/22/2020   PPT with Double knees to chest with shoulder from 90 to 0 moving towards extension with theraband to resist 0x3   Supine march with transverse abdominus (TA) and PPT activated 3x10 with cues on rib lowering as well B   1/2 table top L up 6x10 sec; 5x10 sec R with exhale focus       PPT in R sidelying 0x10   Standing mekhi lateral stretch Limited tolerance 0   Prone mekhi 0x10   Cervical retraction on grey physioball 0x8; then add cervical rotation R/L 0x5   PT to add rotator cuff R UE    Scapular retract/depress R with L cervical rotation and flexion to stretch levator 0x 30 sec    Prone glute lift AAROM 0x8   qped TA with alt arms 0x8 B   hooklying ball squeeze with PPT and rib lowering with feet on wall (hips at 90) Yellow ball 3x8    R sidelying L hip adduction 0x8 with AAROM to AROM and cues on core support   Plank at plinth 3x10 then 2x10   1/2 kneeling PPT 2x8 with shoulder flexion as able   NO ESTIM TODAY / NO TDN   Pt received estim to the L paraspinals with DN placement with NMES on the low frequency and medium setting (3 min each) with 2 needles in the Lumbar muscles and 2 in the gluteal medius and minimus on the L side for  0 min each. Pt declined MH to follow so PT used soft tissue work after DN then therex and ice at the end for 0 minutes supervised.      Jean Claude participated in Cervical traction activities today at a low weight of 14 pounds and max of 22 pounds for 5 sec on and 30 seconds off for 10 min    Home Exercises Provided and Patient Education Provided     Education/Self-Care provided:  (during therex )   Patient educated on biomechanical justification for therapeutic exercise and importance of compliance with HEP in order to improve overall impairments and QOL    Patient educated on postural awareness and the use of a lumbar roll when in a seated position to reduce stress and maintain optimal alignment of the spine.    Patient educated on the importance of improved core and upper and lower extremity strength in order to improve alignment of the spine and upper and lower extremities with static positions and dynamic movement.    Patient educated on the importance of strong core and lower extremity musculature in order to improve both static and dynamic balance, improve gait mechanics, reduce fall risk and improve household and community mobility.       Written Home Exercises Provided: Patient instructed to cont prior HEP.  Exercises were reviewed and Jean Claude was able to demonstrate them prior to the end of the session.  Jean Claude demonstrated good  understanding of the education provided.     See EMR under Patient Instructions for exercises provided prior visit.    ASSESSMENT   PT continued working with pt on tasks to open the lumbar spine and increase deep neck flexors to improve standing posture. PT advanced pt on core work and explained that we have to work consistently through therex to improve core support meaning when he is tired at home and having pain, that is the time to do his HEP to help provide core support to the back and reduce pain as well as to help build endurance as when his stabilizers deactivate, he needs to reactivate them to help support the spine in a more flexed posture to reduce lumbar extension motions that seem to increase his pain. Pt liked cervical traction today. PT to continue with distraction and strength work.  Will benefit from improved strength. Symptoms in arm appear to be in radial nerve path .     Jean Claude is progressing well towards his goals.   Pt prognosis is  Excellent.     Pt will continue to benefit from skilled outpatient physical therapy to address the deficits listed in the problem list box on initial evaluation, provide pt/family education and to maximize pt's level of independence in the home and community environment.     Pt's spiritual, cultural and educational needs considered and pt agreeable to plan of care and goals.     Anticipated barriers to physical therapy: hx of prior PT with limited success and limited consistent compliance    Goals:   Short Term Goals: In 4 weeks 8/27/2020  1.I with HEP Not met  2.Pt to increase lumbar ROM to 20 degrees with flexion. Met  3.Pt to increase hamstring AROM to 70 degrees . Met  4.Pt to increase B hip abduction and flexion strength by 1/2 grade.  Progressing  5.Pt to have pain less than 7/10 or better at all times. Progressing  6.Pt to improve score on the FOTO by 5%. Not met  7. Pt to be educated on postural/body mechanics awareness. Not met     Long Term Goals: In 8 weeks 9/24/2020  1.Patient to improve score on the FOTO to 32% or better.   2. Patient to demo increase in LE strength to 4/5. Progressing  3. Patient to have decreased pain to 3/10 or better at worst.  4. Patient to demo increase cervical extension ROM to 40 degrees or better. Met so upgraded to 70 degrees  5. Patient to increase hip AROM to 100 degrees with flexion. Met  6.. Patient to perform daily activities including squatting to pick an item up off the floor, reaching overhead and walking for community distances without limitation. Progressing  7. Pt to perform shoulder flexion to 155 degree or better.       PLAN   Continue Plan of Care (POC) and progress per patient tolerance.    Becky Light, PT

## 2020-09-23 LAB
HAV IGM SERPL QL IA: NEGATIVE
HBV CORE IGM SERPL QL IA: NEGATIVE
HBV SURFACE AG SERPL QL IA: NEGATIVE
HCV AB SERPL QL IA: NEGATIVE

## 2020-09-24 LAB
ESTIMATED AVG GLUCOSE: 157 MG/DL (ref 68–131)
HBA1C MFR BLD HPLC: 7.1 % (ref 4–5.6)

## 2020-09-29 ENCOUNTER — TELEPHONE (OUTPATIENT)
Dept: RADIOLOGY | Facility: HOSPITAL | Age: 50
End: 2020-09-29

## 2020-09-30 ENCOUNTER — HOSPITAL ENCOUNTER (OUTPATIENT)
Dept: RADIOLOGY | Facility: HOSPITAL | Age: 50
Discharge: HOME | End: 2020-09-30
Attending: FAMILY MEDICINE
Payer: MEDICAID

## 2020-09-30 DIAGNOSIS — R74.01 ELEVATED TRANSAMINASE LEVEL: ICD-10-CM

## 2020-09-30 PROCEDURE — 76705 ECHO EXAM OF ABDOMEN: CPT | Mod: TC

## 2020-09-30 PROCEDURE — 76705 US ABDOMEN LIMITED: ICD-10-PCS | Mod: 26,,, | Performed by: RADIOLOGY

## 2020-09-30 PROCEDURE — 76705 ECHO EXAM OF ABDOMEN: CPT | Mod: 26,,, | Performed by: RADIOLOGY

## 2020-10-04 ENCOUNTER — PATIENT MESSAGE (OUTPATIENT)
Dept: INTERNAL MEDICINE | Facility: CLINIC | Age: 50
End: 2020-10-04

## 2020-10-05 NOTE — PROGRESS NOTES
"Hi, Jean Claude.    These test results show that your liver and spleen are enlarged.  We'll discuss your test results in more detail, what they mean, and what -- if anything -- needs to be done at your next appointment. I look forward to seeing you then.    Thanks for letting me care for you, and thanks for trusting [facility] with your healthcare needs.    Sincerely,    GUTIERREZ Crook MD  P.S. Want to learn more about your test results and what they mean? It's as simple as 1, 2, 3.     (1) Log in to your [email or web address] account at [email or web address]     (2) From the "View test results" tab, click on the test you want to know more about.     (3) Click on the "About This Test" link."

## 2020-10-06 ENCOUNTER — DOCUMENTATION ONLY (OUTPATIENT)
Dept: REHABILITATION | Facility: HOSPITAL | Age: 50
End: 2020-10-06

## 2020-10-06 PROBLEM — M25.561 ACUTE PAIN OF BOTH KNEES: Status: RESOLVED | Noted: 2020-07-30 | Resolved: 2020-10-06

## 2020-10-06 PROBLEM — R20.2 BILATERAL NUMBNESS AND TINGLING OF ARMS AND LEGS: Chronic | Status: RESOLVED | Noted: 2020-07-27 | Resolved: 2020-10-06

## 2020-10-06 PROBLEM — M25.562 ACUTE PAIN OF BOTH KNEES: Status: RESOLVED | Noted: 2020-07-30 | Resolved: 2020-10-06

## 2020-10-06 PROBLEM — M54.2 NECK PAIN: Status: RESOLVED | Noted: 2020-07-30 | Resolved: 2020-10-06

## 2020-10-06 PROBLEM — R53.1 GENERAL WEAKNESS: Status: RESOLVED | Noted: 2020-07-30 | Resolved: 2020-10-06

## 2020-10-06 PROBLEM — Z74.09 DECREASED FUNCTIONAL MOBILITY AND ENDURANCE: Status: RESOLVED | Noted: 2020-07-30 | Resolved: 2020-10-06

## 2020-10-06 PROBLEM — R20.0 BILATERAL NUMBNESS AND TINGLING OF ARMS AND LEGS: Chronic | Status: RESOLVED | Noted: 2020-07-27 | Resolved: 2020-10-06

## 2020-10-06 NOTE — PROGRESS NOTES
Outpatient Therapy Discharge Summary     Name: Jean Claude Ocasio  Clinic Number: [id number]     Therapy Diagnosis:        Encounter Diagnoses   Name Primary?    Bilateral numbness and tingling of arms and legs      Type 2 diabetes mellitus with diabetic polyneuropathy, without long-term current use of insulin      Neck pain      Acute pain of both knees      General weakness      Decreased functional mobility and endurance        Physician: GUTIERREZ Crook MD   Physician Orders: PT Eval and Treat   Medical Diagnosis from Referral:   M54.5,G89.29 (ICD-10-CM) - Chronic bilateral low back pain without sciatica   M47.816 (ICD-10-CM) - Spondylosis of lumbar region without myelopathy or radiculopathy   Evaluation Date: 7/30/2020  Authorization Period Expiration: 12/31/2020  Plan of Care Expiration: 9/24/2020    Visit #/Visits authorized: 11/20     Precautions: Standard and Diabetes, HTN, Vitiligo (autoimmune)     Date of Last visit: 9/22/2020  Total Visits Received: 11  Cancelled Visits: 7  No Show Visits: 2      CMS Impairment/Limitation/Restriction for FOTO unable to assess Survey    Therapist reviewed FOTO scores for Jean Claude Ocasio on 10/6/2020.   FOTO documents entered into [employer] - see Media section.    Limitation Score: unable to assess%       Discharge reason: Other:  Pt spouse is having open heart surgery so he is unable to attend PT and may be unable for some time due to needing to assist his children and spouse. Pt goals not assessed as he is unable to return at this time. PT referred him to return to his Dr when he is ready to try PT again.      Plan   This patient is discharged from Physical Therapy    Becky Light, PT

## 2020-10-07 ENCOUNTER — LAB VISIT (OUTPATIENT)
Dept: LAB | Facility: HOSPITAL | Age: 50
End: 2020-10-07
Attending: FAMILY MEDICINE
Payer: MEDICAID

## 2020-10-07 ENCOUNTER — OFFICE VISIT (OUTPATIENT)
Dept: INTERNAL MEDICINE | Facility: CLINIC | Age: 50
End: 2020-10-07
Payer: MEDICAID

## 2020-10-07 VITALS
TEMPERATURE: 98 F | BODY MASS INDEX: 38.25 KG/M2 | OXYGEN SATURATION: 97 % | WEIGHT: 298.06 LBS | SYSTOLIC BLOOD PRESSURE: 120 MMHG | DIASTOLIC BLOOD PRESSURE: 88 MMHG | HEIGHT: 74 IN | HEART RATE: 90 BPM

## 2020-10-07 DIAGNOSIS — Z23 NEED FOR INFLUENZA VACCINATION: ICD-10-CM

## 2020-10-07 DIAGNOSIS — D73.89 LESION OF SPLEEN: ICD-10-CM

## 2020-10-07 DIAGNOSIS — R16.2 HEPATOSPLENOMEGALY: Chronic | ICD-10-CM

## 2020-10-07 DIAGNOSIS — M54.50 CHRONIC BILATERAL LOW BACK PAIN WITHOUT SCIATICA: ICD-10-CM

## 2020-10-07 DIAGNOSIS — Z23 NEED FOR DIPHTHERIA-TETANUS-PERTUSSIS (TDAP) VACCINE: ICD-10-CM

## 2020-10-07 DIAGNOSIS — G89.29 CHRONIC BILATERAL LOW BACK PAIN WITHOUT SCIATICA: ICD-10-CM

## 2020-10-07 DIAGNOSIS — G47.33 OBSTRUCTIVE SLEEP APNEA SYNDROME: ICD-10-CM

## 2020-10-07 DIAGNOSIS — K76.0 NAFLD (NONALCOHOLIC FATTY LIVER DISEASE): Chronic | ICD-10-CM

## 2020-10-07 DIAGNOSIS — E66.01 CLASS 2 SEVERE OBESITY DUE TO EXCESS CALORIES WITH SERIOUS COMORBIDITY AND BODY MASS INDEX (BMI) OF 38.0 TO 38.9 IN ADULT: ICD-10-CM

## 2020-10-07 DIAGNOSIS — I10 ESSENTIAL HYPERTENSION: Chronic | ICD-10-CM

## 2020-10-07 DIAGNOSIS — Z00.01 ENCOUNTER FOR WELL ADULT EXAM WITH ABNORMAL FINDINGS: Primary | ICD-10-CM

## 2020-10-07 DIAGNOSIS — E11.69 TYPE 2 DIABETES MELLITUS WITH OTHER SPECIFIED COMPLICATION, WITHOUT LONG-TERM CURRENT USE OF INSULIN: Chronic | ICD-10-CM

## 2020-10-07 DIAGNOSIS — E11.69 DYSLIPIDEMIA ASSOCIATED WITH TYPE 2 DIABETES MELLITUS: ICD-10-CM

## 2020-10-07 DIAGNOSIS — Z28.21 IMMUNIZATION NOT CARRIED OUT BECAUSE OF PATIENT REFUSAL: ICD-10-CM

## 2020-10-07 DIAGNOSIS — E78.5 DYSLIPIDEMIA ASSOCIATED WITH TYPE 2 DIABETES MELLITUS: ICD-10-CM

## 2020-10-07 DIAGNOSIS — M47.816 SPONDYLOSIS OF LUMBAR REGION WITHOUT MYELOPATHY OR RADICULOPATHY: ICD-10-CM

## 2020-10-07 DIAGNOSIS — Z23 NEED FOR 23-POLYVALENT PNEUMOCOCCAL POLYSACCHARIDE VACCINE: ICD-10-CM

## 2020-10-07 DIAGNOSIS — E11.42 TYPE 2 DIABETES MELLITUS WITH DIABETIC POLYNEUROPATHY, WITHOUT LONG-TERM CURRENT USE OF INSULIN: Chronic | ICD-10-CM

## 2020-10-07 PROBLEM — M72.2 PLANTAR FASCIITIS: Status: RESOLVED | Noted: 2019-12-02 | Resolved: 2020-10-07

## 2020-10-07 PROBLEM — M79.672 PAIN OF LEFT HEEL: Status: RESOLVED | Noted: 2019-10-24 | Resolved: 2020-10-07

## 2020-10-07 PROBLEM — E66.9 OBESITY (BMI 30-39.9): Status: RESOLVED | Noted: 2019-09-26 | Resolved: 2020-10-07

## 2020-10-07 LAB
ALBUMIN SERPL BCP-MCNC: 4 G/DL (ref 3.5–5.2)
ALP SERPL-CCNC: 65 U/L (ref 55–135)
ALT SERPL W/O P-5'-P-CCNC: 55 U/L (ref 10–44)
ANION GAP SERPL CALC-SCNC: 9 MMOL/L (ref 8–16)
AST SERPL-CCNC: 41 U/L (ref 10–40)
BILIRUB SERPL-MCNC: 0.4 MG/DL (ref 0.1–1)
BUN SERPL-MCNC: 11 MG/DL (ref 6–20)
CALCIUM SERPL-MCNC: 9.4 MG/DL (ref 8.7–10.5)
CHLORIDE SERPL-SCNC: 101 MMOL/L (ref 95–110)
CO2 SERPL-SCNC: 25 MMOL/L (ref 23–29)
CREAT SERPL-MCNC: 0.8 MG/DL (ref 0.5–1.4)
EST. GFR  (AFRICAN AMERICAN): >60 ML/MIN/1.73 M^2
EST. GFR  (NON AFRICAN AMERICAN): >60 ML/MIN/1.73 M^2
GLUCOSE SERPL-MCNC: 197 MG/DL (ref 70–110)
POTASSIUM SERPL-SCNC: 4.1 MMOL/L (ref 3.5–5.1)
PROT SERPL-MCNC: 7.8 G/DL (ref 6–8.4)
SODIUM SERPL-SCNC: 135 MMOL/L (ref 136–145)

## 2020-10-07 PROCEDURE — 99396 PR PREVENTIVE VISIT,EST,40-64: ICD-10-PCS | Mod: S$PBB,,, | Performed by: FAMILY MEDICINE

## 2020-10-07 PROCEDURE — 99215 OFFICE O/P EST HI 40 MIN: CPT | Mod: PBBFAC | Performed by: FAMILY MEDICINE

## 2020-10-07 PROCEDURE — 99999 PR PBB SHADOW E&M-EST. PATIENT-LVL V: CPT | Mod: PBBFAC,,, | Performed by: FAMILY MEDICINE

## 2020-10-07 PROCEDURE — 99396 PREV VISIT EST AGE 40-64: CPT | Mod: S$PBB,,, | Performed by: FAMILY MEDICINE

## 2020-10-07 PROCEDURE — 99999 PR PBB SHADOW E&M-EST. PATIENT-LVL V: ICD-10-PCS | Mod: PBBFAC,,, | Performed by: FAMILY MEDICINE

## 2020-10-07 PROCEDURE — 36415 COLL VENOUS BLD VENIPUNCTURE: CPT

## 2020-10-07 PROCEDURE — 80053 COMPREHEN METABOLIC PANEL: CPT

## 2020-10-07 RX ORDER — ATORVASTATIN CALCIUM 20 MG/1
20 TABLET, FILM COATED ORAL NIGHTLY
Qty: 90 TABLET | Refills: 4 | Status: SHIPPED | OUTPATIENT
Start: 2020-10-07 | End: 2023-08-01 | Stop reason: SDUPTHER

## 2020-10-07 NOTE — ASSESSMENT & PLAN NOTE
Wt Readings from Last 6 Encounters:   10/07/20 135.2 kg (298 lb 1 oz)   09/22/20 135 kg (297 lb 9.9 oz)   09/22/20 135.1 kg (297 lb 13.5 oz)   07/27/20 135.8 kg (299 lb 6.2 oz)   07/18/20 131.5 kg (290 lb)   01/21/20 (!) 136.2 kg (300 lb 4.3 oz)     BMI Readings from Last 2 Encounters:   10/07/20 38.27 kg/m²   09/22/20 38.21 kg/m²   Stable. Therapeutic lifestyle changes encouraged.

## 2020-10-07 NOTE — LETTER
"NAME: Jean Claude Ocasio   : 1970  Today's Date: 10/07/2020      Supplemental information for "[employer] [facility] [employer] Statement - Physical", completed at the request of Jean Claude Ocasio and given to the patient for his redisclosure or distribution.    PROBLEM LIST  He has Essential hypertension; Type 2 diabetes mellitus with other specified complication; Chronic bilateral low back pain without sciatica; Type 2 diabetes mellitus with diabetic polyneuropathy, without long-term current use of insulin; Obstructive sleep apnea syndrome; Spondylosis of lumbar region without myelopathy or radiculopathy; History of Helicobacter pylori infection; Class 2 severe obesity due to excess calories with serious comorbidity and body mass index (BMI) of 38.0 to 38.9 in adult; NAFLD (nonalcoholic fatty liver disease); Hepatosplenomegaly; Lesion of spleen; Dyslipidemia associated with type 2 diabetes mellitus; and Immunizations not carried out because of patient refusal on their problem list.    CURRENT MEDICATIONS  He has a current medication list which includes the following prescription(s): accu-chek guide test strips, aspirin, baclofen, blood-glucose meter, glimepiride, lancets, tradjenta, victoza 2-nola, lisinopril, loratadine, meloxicam, metformin, pen needle, diabetic, and atorvastatin.     PAST MEDICAL HISTORY  He  has a past medical history of Arthritis, Chronic bilateral low back pain without sciatica, Class 2 severe obesity due to excess calories with serious comorbidity and body mass index (BMI) of 38.0 to 38.9 in adult, Diabetes mellitus, type 2, Essential hypertension, Hepatosplenomegaly, Hyperlipidemia, Hypertension, Lesion of spleen, NAFLD (nonalcoholic fatty liver disease), Obstructive sleep apnea syndrome, Spondylosis of lumbar region without myelopathy or radiculopathy, Type 2 diabetes mellitus with diabetic polyneuropathy, without long-term current use of insulin, Type 2 diabetes mellitus " with other specified complication, and Uncontrolled type 2 diabetes mellitus with hyperglycemia.      NAME: Jean Claude Ocasio   : 1970  Today's Date:      10/07/2020        Patient Responses to Brief Disability Questionnaire    1. Have your health problems limited you in any of the following activities:  a. The kinds or amount of vigorous activity you can do, like lifting heavy objects, running, or sports?  RESPONSE: Yes, moderately or definitely.  b. Walking long distances (that is 1-2 Km)?  RESPONSE: Yes, moderately or definitely.  c. The kinds or amount of moderate activity you can do, like moving a table, or carrying groceries?  RESPONSE: Yes, moderately or definitely.  d. Climbing stairs or walking uphill?  RESPONSE: Yes, moderately or definitely.  e. Bending, lifting or stooping?  RESPONSE: Yes, sometimes or a little.  f. Eating, dressing, bathing or using the toilet?  RESPONSE: Yes, sometimes or a little.    2. Have you had to cut down or stop any activity you used to do, such as a hobby, because of your illness?  RESPONSE: Yes, sometimes or a little.    3. Have you been unable to do something that your family (or household) expected from you as part of your daily routine?  RESPONSE: Yes, sometimes or a little.    4. Have your personal problems decreased your motivation to work?  RESPONSE: Yes, moderately or definitely.    5. Have your personal problems decreased your personal efficiency at home, school or work?  RESPONSE: Yes, moderately or definitely.    6. Has there been a deterioration in your social relations with friends, workmates, or other people?  RESPONSE: Yes, sometimes or a little.    7. During the past week how many days in total were you unable to carry out your usual daily activities fully?  RESPONSE: 7    8. During the past week how many days in total did you stay in bed all or most of the day because of your illness?  RESPONSE: 7

## 2020-10-07 NOTE — ASSESSMENT & PLAN NOTE
Chronic, stable. He reports no fever, sweats, involuntary weight loss, loss of lower extremity strength or sensation, saddle anesthesia, or incontinence of urine or feces.

## 2020-10-07 NOTE — PROGRESS NOTES
"NAME: Jean Claude Ocasio   : 1970  Today's Date: 10/07/2020      Supplemental information for "[facility] [employer] [facility] [employer] Statement - Physical", completed at the request of Jean Claude Ocasio and given to the patient for his redisclosure or distribution.    PROBLEM LIST  He has Essential hypertension; Type 2 diabetes mellitus with other specified complication; Chronic bilateral low back pain without sciatica; Type 2 diabetes mellitus with diabetic polyneuropathy, without long-term current use of insulin; Obstructive sleep apnea syndrome; Spondylosis of lumbar region without myelopathy or radiculopathy; History of Helicobacter pylori infection; Class 2 severe obesity due to excess calories with serious comorbidity and body mass index (BMI) of 38.0 to 38.9 in adult; NAFLD (nonalcoholic fatty liver disease); Hepatosplenomegaly; Lesion of spleen; Dyslipidemia associated with type 2 diabetes mellitus; and Immunizations not carried out because of patient refusal on their problem list.    CURRENT MEDICATIONS  He has a current medication list which includes the following prescription(s): accu-chek guide test strips, aspirin, baclofen, blood-glucose meter, glimepiride, lancets, tradjenta, victoza 2-nola, lisinopril, loratadine, meloxicam, metformin, pen needle, diabetic, and atorvastatin.     PAST MEDICAL HISTORY  He  has a past medical history of Arthritis, Chronic bilateral low back pain without sciatica, Class 2 severe obesity due to excess calories with serious comorbidity and body mass index (BMI) of 38.0 to 38.9 in adult, Diabetes mellitus, type 2, Essential hypertension, Hepatosplenomegaly, Hyperlipidemia, Hypertension, Lesion of spleen, NAFLD (nonalcoholic fatty liver disease), Obstructive sleep apnea syndrome, Spondylosis of lumbar region without myelopathy or radiculopathy, Type 2 diabetes mellitus with diabetic polyneuropathy, without long-term current use of insulin, Type 2 diabetes mellitus " with other specified complication, and Uncontrolled type 2 diabetes mellitus with hyperglycemia.    Patient Responses to Brief Disability Questionnaire    1. Have your health problems limited you in any of the following activities:  a. The kinds or amount of vigorous activity you can do, like lifting heavy objects, running, or sports?  RESPONSE: Yes, moderately or definitely.  b. Walking long distances (that is 1-2 Km)?  RESPONSE: Yes, moderately or definitely.  c. The kinds or amount of moderate activity you can do, like moving a table, or carrying groceries?  RESPONSE: Yes, moderately or definitely.  d. Climbing stairs or walking uphill?  RESPONSE: Yes, moderately or definitely.  e. Bending, lifting or stooping?  RESPONSE: Yes, sometimes or a little.  f. Eating, dressing, bathing or using the toilet?  RESPONSE: Yes, sometimes or a little.    2. Have you had to cut down or stop any activity you used to do, such as a hobby, because of your illness?  RESPONSE: Yes, sometimes or a little.    3. Have you been unable to do something that your family (or household) expected from you as part of your daily routine?  RESPONSE: Yes, sometimes or a little.    4. Have your personal problems decreased your motivation to work?  RESPONSE: Yes, moderately or definitely.    5. Have your personal problems decreased your personal efficiency at home, school or work?  RESPONSE: Yes, moderately or definitely.    6. Has there been a deterioration in your social relations with friends, workmates, or other people?  RESPONSE: Yes, sometimes or a little.    7. During the past week how many days in total were you unable to carry out your usual daily activities fully?  RESPONSE: 7    8. During the past week how many days in total did you stay in bed all or most of the day because of your illness?  RESPONSE: 7

## 2020-10-07 NOTE — PROGRESS NOTES
WELLNESS VISIT (PREVENTIVE SERVICES)    CHIEF COMPLAINT  Annual Wellness Exam    HEALTH MAINTENANCE INTERVENTIONS - UP TO DATE  Health Maintenance Topics with due status: Not Due       Topic Last Completion Date    Lipid Panel 12/17/2019    Eye Exam 02/01/2020    Foot Exam 07/27/2020    Hemoglobin A1c 09/22/2020    Low Dose Statin 10/07/2020       HEALTH MAINTENANCE INTERVENTIONS - DUE OR DUE SOON  Health Maintenance Due   Topic Date Due    TETANUS VACCINE  06/20/1989    Pneumococcal Vaccine (Medium Risk) (1 of 1 - PPSV23) 06/20/1990    Influenza Vaccine (1) 08/01/2020       Except as noted herein, any chronic conditions are compensated/controlled and stable, and no other significant new complaints or concerns reported.     DIAGNOSES, HISTORY, ASSESSMENT, AND PLAN  Assessment   1. Encounter for well adult exam with abnormal findings    2. NAFLD (nonalcoholic fatty liver disease)    3. Hepatosplenomegaly    4. Lesion of spleen    5. Spondylosis of lumbar region without myelopathy or radiculopathy    6. Need for diphtheria-tetanus-pertussis (Tdap) vaccine    7. Need for influenza vaccination    8. Need for 23-polyvalent pneumococcal polysaccharide vaccine    9. Class 2 severe obesity due to excess calories with serious comorbidity and body mass index (BMI) of 38.0 to 38.9 in adult    10. Chronic bilateral low back pain without sciatica    11. Essential hypertension    12. Type 2 diabetes mellitus with diabetic polyneuropathy, without long-term current use of insulin    13. Type 2 diabetes mellitus with other specified complication, without long-term current use of insulin    14. Obstructive sleep apnea syndrome         Orders Placed This Encounter    CT Abdomen With Contrast    Comprehensive Metabolic Panel    Ambulatory referral/consult to Gastroenterology       Plan   Problem List Items Addressed This Visit     Essential hypertension (Chronic)    Type 2 diabetes mellitus with other specified complication  (Chronic)    Chronic bilateral low back pain without sciatica    Overview     X-ray Lumbar Spine December, 2019: Dextroscoliosis of the lumbar spine is noted.  There is minimal grade 1 retrolisthesis of L2 on L3 and L3 on L4.  No fracture is seen.  Degenerative changes are noted with spurring of the endplates and lower lumbar facet arthropathy.  Mild disc space narrowing at L2-L3.         Current Assessment & Plan     Chronic, stable. He reports no fever, sweats, involuntary weight loss, loss of lower extremity strength or sensation, saddle anesthesia, or incontinence of urine or feces.         Type 2 diabetes mellitus with diabetic polyneuropathy, without long-term current use of insulin (Chronic)    Obstructive sleep apnea syndrome    Spondylosis of lumbar region without myelopathy or radiculopathy    Current Assessment & Plan     Has been doing physical therapy 2 times weekly for last 5-6 weeks with minimal improvement.         Class 2 severe obesity due to excess calories with serious comorbidity and body mass index (BMI) of 38.0 to 38.9 in adult    Current Assessment & Plan     Wt Readings from Last 6 Encounters:   10/07/20 135.2 kg (298 lb 1 oz)   09/22/20 135 kg (297 lb 9.9 oz)   09/22/20 135.1 kg (297 lb 13.5 oz)   07/27/20 135.8 kg (299 lb 6.2 oz)   07/18/20 131.5 kg (290 lb)   01/21/20 (!) 136.2 kg (300 lb 4.3 oz)     BMI Readings from Last 2 Encounters:   10/07/20 38.27 kg/m²   09/22/20 38.21 kg/m²   Stable. Therapeutic lifestyle changes encouraged.           NAFLD (nonalcoholic fatty liver disease) (Chronic)    Overview     US ABDOMEN LIMITED 09/30/2020  FINDINGS: Liver: Increased in size, measuring 21.3 cm. Homogeneous increased echotexture. No focal hepatic lesions.  Gallbladder: No calculi, wall thickening, or pericholecystic fluid. No sonographic Hdez's sign.  Biliary system: The common duct is not dilated, measuring 5 mm. No intrahepatic ductal dilatation.  Spleen: Increased in size and  echotexture, measuring 15.9 cm. There is heterogeneous area in the spleen which measures 3 x 2.2 x 2.1 cm with more central area of isoechogenicity with peripheral hypoechoic region.  Miscellaneous: No upper abdominal ascites. Antegrade flow in the main portal vein  Visualized proximal portions of the pancreas are within normal limits. Distal body and tail are partially obscured by overlying bowel gas.  IMPRESSION: Hepatic steatosis with hepatosplenomegaly. Indeterminate 3 cm heterogeneous mass in the spleen, potentially a hemangioma although other etiologies possible as well. Consider attention on follow-up versus further evaluation with CT or MR for characterization.           Current Assessment & Plan     Asymptomatic.          Relevant Orders    CT Abdomen With Contrast    Comprehensive Metabolic Panel    Ambulatory referral/consult to Gastroenterology    Hepatosplenomegaly (Chronic)    Overview     US ABDOMEN LIMITED 09/30/2020  FINDINGS: Liver: Increased in size, measuring 21.3 cm. Homogeneous increased echotexture. No focal hepatic lesions.  Gallbladder: No calculi, wall thickening, or pericholecystic fluid. No sonographic Hdez's sign.  Biliary system: The common duct is not dilated, measuring 5 mm. No intrahepatic ductal dilatation.  Spleen: Increased in size and echotexture, measuring 15.9 cm. There is heterogeneous area in the spleen which measures 3 x 2.2 x 2.1 cm with more central area of isoechogenicity with peripheral hypoechoic region.  Miscellaneous: No upper abdominal ascites. Antegrade flow in the main portal vein  Visualized proximal portions of the pancreas are within normal limits. Distal body and tail are partially obscured by overlying bowel gas.  IMPRESSION: Hepatic steatosis with hepatosplenomegaly. Indeterminate 3 cm heterogeneous mass in the spleen, potentially a hemangioma although other etiologies possible as well. Consider attention on follow-up versus further evaluation with CT or MR  for characterization.         Current Assessment & Plan     Asymptomatic.         Relevant Orders    CT Abdomen With Contrast    Comprehensive Metabolic Panel    Ambulatory referral/consult to Gastroenterology    Lesion of spleen    Overview     US ABDOMEN LIMITED 09/30/2020  Spleen: Increased in size and echotexture, measuring 15.9 cm. There is heterogeneous area in the spleen which measures 3 x 2.2 x 2.1 cm with more central area of isoechogenicity with peripheral hypoechoic region.  IMPRESSION: ...Indeterminate 3 cm heterogeneous mass in the spleen, potentially a hemangioma although other etiologies possible as well. Consider attention on follow-up versus further evaluation with CT or MR for characterization.             Current Assessment & Plan     Asymptomatic. We discussed differential diagnosis.         Relevant Orders    CT Abdomen With Contrast    Comprehensive Metabolic Panel      Other Visit Diagnoses     Encounter for well adult exam with abnormal findings    -  Primary    Need for diphtheria-tetanus-pertussis (Tdap) vaccine        Need for influenza vaccination        Need for 23-polyvalent pneumococcal polysaccharide vaccine              Outpatient Medications Prior to Visit   Medication Sig Dispense Refill    ACCU-CHEK GUIDE TEST STRIPS Strp USE 1 TEST STRIP TO TEST BLOOD SUGAR 4 TIMES DAILY. 150 each 0    aspirin (ADULT LOW DOSE ASPIRIN) 81 MG EC tablet 1 tablet(s) by mouth daily      baclofen (LIORESAL) 10 MG tablet Take 1 tablet (10 mg total) by mouth 3 (three) times daily as needed. 90 tablet 11    blood-glucose meter kit Use as instructed 1 each 0    glimepiride (AMARYL) 4 MG tablet 1-2 tab a day as needed 60 tablet 3    lancets 30 gauge Misc qid 150 each 5    linaGLIPtin (TRADJENTA) 5 mg Tab tablet Take 1 tablet (5 mg total) by mouth once daily. 30 tablet 5    liraglutide 0.6 mg/0.1 mL, 18 mg/3 mL, subq PNIJ (VICTOZA 2-PAUL) 0.6 mg/0.1 mL (18 mg/3 mL) PnIj pen Inject 0.6 mg into the skin  "once daily. 3 mL 1    lisinopril (PRINIVIL,ZESTRIL) 20 MG tablet Take 1 tablet (20 mg total) by mouth once daily. 90 tablet 4    loratadine (CLARITIN) 10 mg tablet 1 tablet      meloxicam (MOBIC) 15 MG tablet Take 1 tablet (15 mg total) by mouth once daily. 30 tablet 0    metFORMIN (GLUCOPHAGE) 1000 MG tablet Take 1 tablet by mouth twice daily 180 tablet 0    pen needle, diabetic 31 gauge x 5/16" Ndle Qd for Victoza 100 each 2    simvastatin (ZOCOR) 20 MG tablet 1 tablet(s) by mouth daily       No facility-administered medications prior to visit.         There are no discontinued medications.          Follow up in about 6 months (around 4/7/2021) for re-evaluate problem(s) discussed today.     Subjective   Review of Systems   Constitutional: Negative for chills, diaphoresis and fever.   Respiratory: Negative for chest tightness and shortness of breath.    Gastrointestinal: Negative for change in bowel habit, fecal incontinence and change in bowel habit.   Endocrine: Negative for polydipsia and polyuria.   Genitourinary: Negative for bladder incontinence, difficulty urinating, dysuria and hematuria.   Musculoskeletal: Positive for back pain.   Neurological:        No saddle anesthesia or loss of lower extremity strength or sensation.   Psychiatric/Behavioral: Negative for self-injury. Dysphoric mood:  but not significant enought that he wants any treatment (medication or counseling referral)        Objective   Vitals:    10/07/20 0800   BP: 120/88   BP Location: Left arm   Patient Position: Sitting   BP Method: Large (Manual)   Pulse: 90   Temp: 98.1 °F (36.7 °C)   TempSrc: Tympanic   SpO2: 97%   Weight: 135.2 kg (298 lb 1 oz)   Height: 6' 2" (1.88 m)     Physical Exam  Vitals signs reviewed.   Constitutional:       General: He is not in acute distress.     Appearance: Normal appearance. He is not ill-appearing or diaphoretic.   Eyes:      General: No scleral icterus.  Cardiovascular:      Rate and Rhythm: " "Normal rate and regular rhythm.   Pulmonary:      Effort: Pulmonary effort is normal.      Breath sounds: Normal breath sounds.   Skin:     General: Skin is warm and dry.   Neurological:      General: No focal deficit present.      Mental Status: He is alert and oriented to person, place, and time.   Psychiatric:         Mood and Affect: Mood normal.         Behavior: Behavior normal.         Judgment: Judgment normal.           PAST MEDICAL HISTORY  He has a past medical history of Arthritis, Chronic bilateral low back pain without sciatica, Class 2 severe obesity due to excess calories with serious comorbidity and body mass index (BMI) of 38.0 to 38.9 in adult, Diabetes mellitus, type 2, Essential hypertension, Hepatosplenomegaly, Hyperlipidemia, Hypertension, Lesion of spleen, NAFLD (nonalcoholic fatty liver disease), Obstructive sleep apnea syndrome, Spondylosis of lumbar region without myelopathy or radiculopathy, Type 2 diabetes mellitus with diabetic polyneuropathy, without long-term current use of insulin, Type 2 diabetes mellitus with other specified complication, and Uncontrolled type 2 diabetes mellitus with hyperglycemia.    SURGICAL HISTORY  He has no past surgical history on file.    FAMILY HISTORY  His family history includes Hypertension in his father.     ALLERGIES  Review of patient's allergies indicates:   Allergen Reactions    Codeine Rash    Hydrocodone-acetaminophen Rash       SOCIAL HISTORY   TOBACCO USE HISTORY: He reports that he has quit smoking. He does not have any smokeless tobacco history on file.   ALCOHOL USE HISTORY:  He reports no history of alcohol use.   RECREATIONAL DRUG USE HISTORY:  He reports no history of drug use.    Documentation entered by me for this encounter may have been done in part using speech-recognition technology. Although I have made an effort to ensure accuracy, "sound like" errors may exist and should be interpreted in context. -GUTIERREZ Crook MD.   "

## 2020-10-08 ENCOUNTER — TELEPHONE (OUTPATIENT)
Dept: RADIOLOGY | Facility: HOSPITAL | Age: 50
End: 2020-10-08

## 2020-10-09 ENCOUNTER — HOSPITAL ENCOUNTER (OUTPATIENT)
Dept: RADIOLOGY | Facility: HOSPITAL | Age: 50
Discharge: HOME | End: 2020-10-09
Attending: FAMILY MEDICINE
Payer: MEDICAID

## 2020-10-09 DIAGNOSIS — D73.89 LESION OF SPLEEN: ICD-10-CM

## 2020-10-09 DIAGNOSIS — R16.2 HEPATOSPLENOMEGALY: ICD-10-CM

## 2020-10-09 DIAGNOSIS — K76.0 NAFLD (NONALCOHOLIC FATTY LIVER DISEASE): ICD-10-CM

## 2020-10-09 PROCEDURE — 74160 CT ABDOMEN W/CONTRAST: CPT | Mod: 26,,, | Performed by: RADIOLOGY

## 2020-10-09 PROCEDURE — 74160 CT ABDOMEN WITH CONTRAST: ICD-10-PCS | Mod: 26,,, | Performed by: RADIOLOGY

## 2020-10-09 PROCEDURE — 74160 CT ABDOMEN W/CONTRAST: CPT | Mod: TC

## 2020-10-09 PROCEDURE — 25500020 PHARM REV CODE 255: Performed by: FAMILY MEDICINE

## 2020-10-09 RX ADMIN — IOHEXOL 1000 ML: 12 SOLUTION ORAL at 09:10

## 2020-10-09 RX ADMIN — IOHEXOL 100 ML: 350 INJECTION, SOLUTION INTRAVENOUS at 10:10

## 2020-10-12 ENCOUNTER — PATIENT MESSAGE (OUTPATIENT)
Dept: GASTROENTEROLOGY | Facility: CLINIC | Age: 50
End: 2020-10-12

## 2020-10-12 ENCOUNTER — PATIENT MESSAGE (OUTPATIENT)
Dept: INTERNAL MEDICINE | Facility: CLINIC | Age: 50
End: 2020-10-12

## 2020-10-12 DIAGNOSIS — R16.2 HEPATOSPLENOMEGALY: ICD-10-CM

## 2020-10-12 DIAGNOSIS — D73.89 LESION OF SPLEEN: Primary | ICD-10-CM

## 2020-10-12 NOTE — TELEPHONE ENCOUNTER
ACTION NEEDED: Please read Patient Portal Message (below), and then contact him to verify his receipt and understanding and schedule ultrasound in September, 2021. Thanks.    Orders Placed This Encounter   Procedures    US Abdomen Limited      --------------------------------------------------------------------------------     Jean Claude Kent.    I am happy to report that the CT scan of your abdomen did not show anything that looked like cancer. You need to have the ultrasound of your spleen repeated in 1 year. Someone from [facility] will be contacting you soon to help you schedule this.    Your liver is enlarged and has an excessive amount of fat within the liver tissue. This can cause liver problems (such as cirrhosis), which can also explain your enlarged spleen.    You have a gastroenterology (liver and digestive health) appointment Wednesday, November 4, 2020 at 7:20 AM with Lorrie Mclaughlin NP. She will properly evaluate you and get you the care you need.    Take care, and be well.    Sincerely,    GUTIERREZ Crook MD

## 2020-10-12 NOTE — PROGRESS NOTES
Jean Claude Kent.    I am happy to report that the CT scan of your abdomen did not show anything that looked like cancer. You need to have the ultrasound of your spleen repeated in 1 year. Someone from [facility] will be contacting you soon to help you schedule this.    Your liver is enlarged and has an excessive amount of fat within the liver tissue. This can cause liver problems (such as cirrhosis), which can also explain your enlarged spleen.    You have a gastroenterology (liver and digestive health) appointment Wednesday, November 4, 2020 at 7:20 AM with Lorrie Mclaughlin NP. She will properly evaluate you and get you the care you need.    Take care, and be well.    Sincerely,    GUTIERREZ Crook MD

## 2020-10-12 NOTE — PROGRESS NOTES
"Hi, Jean Claude.    These results are OK and do not require a change in treatment right now. We can discuss your test results in detail at your next appointment.    Thanks for letting me care for you, thanks for trusting us with your healthcare needs, and thanks for using [facility].    Sincerely,    GUTIERREZ Crook MD  --------------------------------------------------------------------------------   Want to learn more about your test results and what they mean?  (1) Log in to your [facility] account at [email or web address].  (2) From the "View test results" tab, click on the test you want to know more about.  (3) Click on the "About This Test" link."

## 2020-10-12 NOTE — TELEPHONE ENCOUNTER
Spoke with pt regarding scheduling a ultrasound for pt the patient is schedule for 09/21/2021.//LB

## 2020-11-04 ENCOUNTER — OFFICE VISIT (OUTPATIENT)
Dept: GASTROENTEROLOGY | Facility: CLINIC | Age: 50
End: 2020-11-04
Payer: MEDICAID

## 2020-11-04 VITALS
DIASTOLIC BLOOD PRESSURE: 78 MMHG | SYSTOLIC BLOOD PRESSURE: 140 MMHG | HEIGHT: 74 IN | WEIGHT: 295.44 LBS | HEART RATE: 80 BPM | OXYGEN SATURATION: 98 % | BODY MASS INDEX: 37.92 KG/M2

## 2020-11-04 DIAGNOSIS — K76.0 NAFLD (NONALCOHOLIC FATTY LIVER DISEASE): Primary | Chronic | ICD-10-CM

## 2020-11-04 DIAGNOSIS — R16.2 HEPATOSPLENOMEGALY: Chronic | ICD-10-CM

## 2020-11-04 DIAGNOSIS — D73.89 LESION OF SPLEEN: ICD-10-CM

## 2020-11-04 PROCEDURE — 99204 OFFICE O/P NEW MOD 45 MIN: CPT | Mod: S$PBB,,, | Performed by: NURSE PRACTITIONER

## 2020-11-04 PROCEDURE — 99999 PR PBB SHADOW E&M-EST. PATIENT-LVL V: CPT | Mod: PBBFAC,,, | Performed by: NURSE PRACTITIONER

## 2020-11-04 PROCEDURE — 99999 PR PBB SHADOW E&M-EST. PATIENT-LVL V: ICD-10-PCS | Mod: PBBFAC,,, | Performed by: NURSE PRACTITIONER

## 2020-11-04 PROCEDURE — 99204 PR OFFICE/OUTPT VISIT, NEW, LEVL IV, 45-59 MIN: ICD-10-PCS | Mod: S$PBB,,, | Performed by: NURSE PRACTITIONER

## 2020-11-04 PROCEDURE — 99215 OFFICE O/P EST HI 40 MIN: CPT | Mod: PBBFAC | Performed by: NURSE PRACTITIONER

## 2020-11-04 NOTE — LETTER
November 4, 2020      GUTIERREZ Crook MD  [address] [place] [address]           [facility]  [address] [facility] [place] [address]  Phone: [phone]  Fax: [phone]          Patient: Jean Claude Ocasio   MR Number: [id number]   YOB: 1970   Date of Visit: 11/4/2020       Dear Dr. GUTIERREZ Crook:    Thank you for referring Jean Claude Ocasio to me for evaluation. Attached you will find relevant portions of my assessment and plan of care.    If you have questions, please do not hesitate to call me. I look forward to following Jean Claude Ocasio along with you.    Sincerely,    Shruti Abreu, [facility]    Enclosure  CC:  No Recipients    If you would like to receive this communication electronically, please contact [email or web address] or [phone] to request more information on [email or web address] Link access.    For providers and/or their staff who would like to refer a patient to [facility], please contact us through our one-stop-shop provider referral line, [facility], at [phone].    If you feel you have received this communication in error or would no longer like to receive these types of communications, please e-mail [email or web address]

## 2020-11-04 NOTE — PROGRESS NOTES
Clinic Consult:  [facility] Consultation Note    Reason for Consult:  The primary encounter diagnosis was Lesion of spleen. Diagnoses of NAFLD (nonalcoholic fatty liver disease) and Hepatosplenomegaly were also pertinent to this visit.    PCP: NADIA Crook   [id number] [facility] / [place] [id number]    HPI:  This is a 50 y.o. male here for evaluation of the above  Pt was recently seen by PCP for wellness visit.  At that time, he was noted to have elevated LFTs.  Pt denies any previously known liver disease.   Per chart review, LFTs have been elevated since 2019.   He states that he then had an US which showed Hepatic steatosis with hepatosplenomegaly.  Indeterminate 3 cm heterogeneous mass in the spleen, potentially a hemangioma although other etiologies possible as well.    A CT was then completed which showed Splenomegaly with a probable hemangioma near the splenic hilum measuring approximately 2.7 cm which correlates with prior ultrasound findings.  Consider 6-12 month follow-up ultrasound to ensure stability. Findings suggesting diffuse fatty infiltration of the liver.    PMH includes HTN, HLD, DM and obesity.   Denies any IVDU.  No ETOH.   Denies any abdominal pain.  No nausea or vomiting.  No change in bowel pattern.  No melena or hematochezia. No weight loss.  No upper GI bleeding.  No ascites or BLE.  No overt confusion.      Review of Systems   Constitutional: Negative for chills, fever, malaise/fatigue and weight loss.   Respiratory: Negative for cough.    Cardiovascular: Negative for chest pain.   Gastrointestinal:        Per HPI   Musculoskeletal: Negative for myalgias.   Skin: Negative for itching and rash.   Neurological: Negative for headaches.   Psychiatric/Behavioral: The patient is not nervous/anxious.        Medical History:   Past Medical History:   Diagnosis Date    Arthritis     Chronic bilateral low back pain without sciatica 7/27/2020    X-ray Lumbar Spine December,  2019: Dextroscoliosis of the lumbar spine is noted.  There is minimal grade 1 retrolisthesis of L2 on L3 and L3 on L4.  No fracture is seen.  Degenerative changes are noted with spurring of the endplates and lower lumbar facet arthropathy.  Mild disc space narrowing at L2-L3.    Class 2 severe obesity due to excess calories with serious comorbidity and body mass index (BMI) of 38.0 to 38.9 in adult 9/22/2020    Diabetes mellitus, type 2     Essential hypertension 1/21/2020    Hepatosplenomegaly 10/7/2020    US ABDOMEN LIMITED 09/30/2020 FINDINGS: Liver: Increased in size, measuring 21.3 cm. Homogeneous increased echotexture. No focal hepatic lesions. Gallbladder: No calculi, wall thickening, or pericholecystic fluid. No sonographic Hdez's sign. Biliary system: The common duct is not dilated, measuring 5 mm. No intrahepatic ductal dilatation. Spleen: Increased in size and echotexture, measuring 15.9    Hyperlipidemia     Hypertension     Lesion of spleen 10/7/2020    US ABDOMEN LIMITED 09/30/2020 Spleen: Increased in size and echotexture, measuring 15.9 cm. There is heterogeneous area in the spleen which measures 3 x 2.2 x 2.1 cm with more central area of isoechogenicity with peripheral hypoechoic region. IMPRESSION: ...Indeterminate 3 cm heterogeneous mass in the spleen, potentially a hemangioma although other etiologies possible as well. Consider attention o    NAFLD (nonalcoholic fatty liver disease) 10/7/2020    US ABDOMEN LIMITED 09/30/2020 FINDINGS: Liver: Increased in size, measuring 21.3 cm. Homogeneous increased echotexture. No focal hepatic lesions. Gallbladder: No calculi, wall thickening, or pericholecystic fluid. No sonographic Hdez's sign. Biliary system: The common duct is not dilated, measuring 5 mm. No intrahepatic ductal dilatation. Spleen: Increased in size and echotexture, measuring 15.9    Obstructive sleep apnea syndrome 7/27/2020    Spondylosis of lumbar region without myelopathy  or radiculopathy 7/27/2020    Type 2 diabetes mellitus with diabetic polyneuropathy, without long-term current use of insulin 7/27/2020    Type 2 diabetes mellitus with other specified complication 4/21/2020    Uncontrolled type 2 diabetes mellitus with hyperglycemia 4/21/2020       Surgical History:  History reviewed. No pertinent surgical history.    Family History:   Family History   Problem Relation Age of Onset    Hypertension Father        Social History:   Social History     Tobacco Use    Smoking status: Former Smoker   Substance Use Topics    Alcohol use: Never     Frequency: Never    Drug use: Never       Allergies: Reviewed    Home Medications:   Current Outpatient Medications on File Prior to Visit   Medication Sig Dispense Refill    ACCU-CHEK GUIDE TEST STRIPS Strp USE 1 TEST STRIP TO TEST BLOOD SUGAR 4 TIMES DAILY. 150 each 0    aspirin (ADULT LOW DOSE ASPIRIN) 81 MG EC tablet 1 tablet(s) by mouth daily      atorvastatin (LIPITOR) 20 MG tablet Take 1 tablet (20 mg total) by mouth every evening. (FOR CHOLESTEROL AND HEART HEALTH) 90 tablet 4    baclofen (LIORESAL) 10 MG tablet Take 1 tablet (10 mg total) by mouth 3 (three) times daily as needed. 90 tablet 11    blood-glucose meter kit Use as instructed 1 each 0    glimepiride (AMARYL) 4 MG tablet 1-2 tab a day as needed 60 tablet 3    lancets 30 gauge Misc qid 150 each 5    linaGLIPtin (TRADJENTA) 5 mg Tab tablet Take 1 tablet (5 mg total) by mouth once daily. 30 tablet 5    liraglutide 0.6 mg/0.1 mL, 18 mg/3 mL, subq PNIJ (VICTOZA 2-PAUL) 0.6 mg/0.1 mL (18 mg/3 mL) PnIj pen Inject 0.6 mg into the skin once daily. 3 mL 1    lisinopril (PRINIVIL,ZESTRIL) 20 MG tablet Take 1 tablet (20 mg total) by mouth once daily. 90 tablet 4    loratadine (CLARITIN) 10 mg tablet 1 tablet      meloxicam (MOBIC) 15 MG tablet Take 1 tablet (15 mg total) by mouth once daily. 30 tablet 0    metFORMIN (GLUCOPHAGE) 1000 MG tablet Take 1 tablet by mouth  "twice daily 180 tablet 0    pen needle, diabetic 31 gauge x 5/16" Ndle Qd for Victoza 100 each 2     No current facility-administered medications on file prior to visit.        Physical Exam:  Vital Signs:  BP (!) 140/78   Pulse 80   Ht 6' 2" (1.88 m)   Wt 134 kg (295 lb 6.7 oz)   SpO2 98%   BMI 37.93 kg/m²   Body mass index is 37.93 kg/m².  Physical Exam  Vitals signs reviewed.   Constitutional:       Appearance: He is well-developed.   HENT:      Head: Normocephalic.   Eyes:      General: No scleral icterus.  Neck:      Musculoskeletal: Normal range of motion.   Cardiovascular:      Rate and Rhythm: Normal rate.   Pulmonary:      Effort: Pulmonary effort is normal.   Abdominal:      General: There is no distension.      Palpations: Abdomen is soft.   Musculoskeletal: Normal range of motion.   Skin:     General: Skin is dry.   Neurological:      Mental Status: He is alert and oriented to person, place, and time.         Labs: Pertinent labs reviewed.    Assessment:  1. Lesion of spleen    2. NAFLD (nonalcoholic fatty liver disease)    3. Hepatosplenomegaly         Recommendations:  - Educated patient on spectrum of fatty liver disease and potential for cirrhosis if MILTON present  - Advised weight loss (10%), strict glycemic control and lipid control (statins are ok if needed, prescribing doctor will need to monitor LFTs per routine)  - Mediterranean diet discussed  - will plan for additional labs to R/O other etiologies of the elevated LFTs.   - Plan for Fibroscan for staging.   - US in 6 months to ensure stability of splenic lesion.     6 months with pre-clinic labs and imaging.   Sooner depending on results of above.     Thank you so much for allowing me to participate in the care of GEETHA Dias    "

## 2020-11-10 RX ORDER — INSULIN PUMP SYRINGE, 3 ML
EACH MISCELLANEOUS
Qty: 1 EACH | Refills: 0 | Status: SHIPPED | OUTPATIENT
Start: 2020-11-10 | End: 2021-07-09 | Stop reason: SDUPTHER

## 2020-11-11 RX ORDER — BLOOD SUGAR DIAGNOSTIC
STRIP MISCELLANEOUS
Qty: 150 EACH | Refills: 6 | Status: SHIPPED | OUTPATIENT
Start: 2020-11-11 | End: 2021-07-09 | Stop reason: SDUPTHER

## 2020-11-12 ENCOUNTER — PROCEDURE VISIT (OUTPATIENT)
Dept: GASTROENTEROLOGY | Facility: CLINIC | Age: 50
End: 2020-11-12
Attending: NURSE PRACTITIONER
Payer: MEDICAID

## 2020-11-12 ENCOUNTER — LAB VISIT (OUTPATIENT)
Dept: LAB | Facility: HOSPITAL | Age: 50
End: 2020-11-12
Attending: NURSE PRACTITIONER
Payer: MEDICAID

## 2020-11-12 DIAGNOSIS — K76.0 NAFLD (NONALCOHOLIC FATTY LIVER DISEASE): Chronic | ICD-10-CM

## 2020-11-12 DIAGNOSIS — R16.2 HEPATOSPLENOMEGALY: Chronic | ICD-10-CM

## 2020-11-12 DIAGNOSIS — K76.0 NAFLD (NONALCOHOLIC FATTY LIVER DISEASE): Primary | ICD-10-CM

## 2020-11-12 LAB
A1AT SERPL-MCNC: 101 MG/DL (ref 100–190)
AFP SERPL-MCNC: 3.8 NG/ML (ref 0–8.4)
ALBUMIN SERPL BCP-MCNC: 3.8 G/DL (ref 3.5–5.2)
ALP SERPL-CCNC: 58 U/L (ref 55–135)
ALT SERPL W/O P-5'-P-CCNC: 71 U/L (ref 10–44)
ANION GAP SERPL CALC-SCNC: 9 MMOL/L (ref 8–16)
AST SERPL-CCNC: 47 U/L (ref 10–40)
BASOPHILS # BLD AUTO: 0.01 K/UL (ref 0–0.2)
BASOPHILS NFR BLD: 0.2 % (ref 0–1.9)
BILIRUB SERPL-MCNC: 0.4 MG/DL (ref 0.1–1)
BUN SERPL-MCNC: 12 MG/DL (ref 6–20)
CALCIUM SERPL-MCNC: 9.5 MG/DL (ref 8.7–10.5)
CERULOPLASMIN SERPL-MCNC: 19 MG/DL (ref 15–45)
CHLORIDE SERPL-SCNC: 101 MMOL/L (ref 95–110)
CO2 SERPL-SCNC: 27 MMOL/L (ref 23–29)
CREAT SERPL-MCNC: 0.9 MG/DL (ref 0.5–1.4)
DIFFERENTIAL METHOD: ABNORMAL
EOSINOPHIL # BLD AUTO: 0 K/UL (ref 0–0.5)
EOSINOPHIL NFR BLD: 0 % (ref 0–8)
ERYTHROCYTE [DISTWIDTH] IN BLOOD BY AUTOMATED COUNT: 12.8 % (ref 11.5–14.5)
EST. GFR  (AFRICAN AMERICAN): >60 ML/MIN/1.73 M^2
EST. GFR  (NON AFRICAN AMERICAN): >60 ML/MIN/1.73 M^2
FERRITIN SERPL-MCNC: 283 NG/ML (ref 20–300)
GLUCOSE SERPL-MCNC: 226 MG/DL (ref 70–110)
HCT VFR BLD AUTO: 41.6 % (ref 40–54)
HGB BLD-MCNC: 13.5 G/DL (ref 14–18)
IMM GRANULOCYTES # BLD AUTO: 0.02 K/UL (ref 0–0.04)
IMM GRANULOCYTES NFR BLD AUTO: 0.4 % (ref 0–0.5)
INR PPP: 1 (ref 0.8–1.2)
IRON SERPL-MCNC: 61 UG/DL (ref 45–160)
LYMPHOCYTES # BLD AUTO: 1.3 K/UL (ref 1–4.8)
LYMPHOCYTES NFR BLD: 23.3 % (ref 18–48)
MCH RBC QN AUTO: 29.7 PG (ref 27–31)
MCHC RBC AUTO-ENTMCNC: 32.5 G/DL (ref 32–36)
MCV RBC AUTO: 92 FL (ref 82–98)
MONOCYTES # BLD AUTO: 0.4 K/UL (ref 0.3–1)
MONOCYTES NFR BLD: 6.8 % (ref 4–15)
NEUTROPHILS # BLD AUTO: 3.8 K/UL (ref 1.8–7.7)
NEUTROPHILS NFR BLD: 69.3 % (ref 38–73)
NRBC BLD-RTO: 0 /100 WBC
PLATELET # BLD AUTO: 177 K/UL (ref 150–350)
PMV BLD AUTO: 11.4 FL (ref 9.2–12.9)
POTASSIUM SERPL-SCNC: 4.2 MMOL/L (ref 3.5–5.1)
PROT SERPL-MCNC: 7.3 G/DL (ref 6–8.4)
PROTHROMBIN TIME: 11.1 SEC (ref 9–12.5)
RBC # BLD AUTO: 4.54 M/UL (ref 4.6–6.2)
SATURATED IRON: 17 % (ref 20–50)
SODIUM SERPL-SCNC: 137 MMOL/L (ref 136–145)
TOTAL IRON BINDING CAPACITY: 367 UG/DL (ref 250–450)
TRANSFERRIN SERPL-MCNC: 248 MG/DL (ref 200–375)
WBC # BLD AUTO: 5.45 K/UL (ref 3.9–12.7)

## 2020-11-12 PROCEDURE — 86704 HEP B CORE ANTIBODY TOTAL: CPT

## 2020-11-12 PROCEDURE — 91200 PR LIVER ELASTOGRAPHY W/OUT IMAG W/INTERP & REPORT: ICD-10-PCS | Mod: 26,S$PBB,, | Performed by: NURSE PRACTITIONER

## 2020-11-12 PROCEDURE — 82105 ALPHA-FETOPROTEIN SERUM: CPT

## 2020-11-12 PROCEDURE — 91200 LIVER ELASTOGRAPHY: CPT | Mod: PBBFAC | Performed by: NURSE PRACTITIONER

## 2020-11-12 PROCEDURE — 82728 ASSAY OF FERRITIN: CPT

## 2020-11-12 PROCEDURE — 85025 COMPLETE CBC W/AUTO DIFF WBC: CPT

## 2020-11-12 PROCEDURE — 86039 ANTINUCLEAR ANTIBODIES (ANA): CPT

## 2020-11-12 PROCEDURE — 86256 FLUORESCENT ANTIBODY TITER: CPT | Mod: 91

## 2020-11-12 PROCEDURE — 80053 COMPREHEN METABOLIC PANEL: CPT

## 2020-11-12 PROCEDURE — 86038 ANTINUCLEAR ANTIBODIES: CPT

## 2020-11-12 PROCEDURE — 87340 HEPATITIS B SURFACE AG IA: CPT

## 2020-11-12 PROCEDURE — 86790 VIRUS ANTIBODY NOS: CPT

## 2020-11-12 PROCEDURE — 91200 LIVER ELASTOGRAPHY: CPT | Mod: 26,S$PBB,, | Performed by: NURSE PRACTITIONER

## 2020-11-12 PROCEDURE — 82103 ALPHA-1-ANTITRYPSIN TOTAL: CPT

## 2020-11-12 PROCEDURE — 86706 HEP B SURFACE ANTIBODY: CPT

## 2020-11-12 PROCEDURE — 85610 PROTHROMBIN TIME: CPT

## 2020-11-12 PROCEDURE — 86235 NUCLEAR ANTIGEN ANTIBODY: CPT | Mod: 59

## 2020-11-12 PROCEDURE — 83540 ASSAY OF IRON: CPT

## 2020-11-12 PROCEDURE — 36415 COLL VENOUS BLD VENIPUNCTURE: CPT

## 2020-11-12 PROCEDURE — 86235 NUCLEAR ANTIGEN ANTIBODY: CPT

## 2020-11-12 PROCEDURE — 82390 ASSAY OF CERULOPLASMIN: CPT

## 2020-11-13 LAB
ANA PATTERN 1: NORMAL
ANA SER QL IF: POSITIVE
ANA TITR SER IF: NORMAL {TITER}
HBV CORE AB SERPL QL IA: NEGATIVE
HBV SURFACE AB SER-ACNC: NEGATIVE M[IU]/ML
HBV SURFACE AG SERPL QL IA: NEGATIVE
HEPATITIS A ANTIBODY, IGG: POSITIVE

## 2020-11-16 ENCOUNTER — PATIENT MESSAGE (OUTPATIENT)
Dept: GASTROENTEROLOGY | Facility: CLINIC | Age: 50
End: 2020-11-16

## 2020-11-16 LAB
MITOCHONDRIA AB TITR SER IF: NORMAL {TITER}
SMOOTH MUSCLE AB TITR SER IF: NORMAL {TITER}

## 2020-11-17 LAB
ANTI SM ANTIBODY: 0.16 RATIO (ref 0–0.99)
ANTI SM/RNP ANTIBODY: 0.18 RATIO (ref 0–0.99)
ANTI-SM INTERPRETATION: NEGATIVE
ANTI-SM/RNP INTERPRETATION: NEGATIVE
ANTI-SSA ANTIBODY: 0.1 RATIO (ref 0–0.99)
ANTI-SSA INTERPRETATION: NEGATIVE
ANTI-SSB ANTIBODY: 0.06 RATIO (ref 0–0.99)
ANTI-SSB INTERPRETATION: NEGATIVE
DSDNA AB SER-ACNC: NORMAL [IU]/ML

## 2020-11-18 ENCOUNTER — PATIENT MESSAGE (OUTPATIENT)
Dept: GASTROENTEROLOGY | Facility: CLINIC | Age: 50
End: 2020-11-18

## 2020-11-27 ENCOUNTER — TELEPHONE (OUTPATIENT)
Dept: DIABETES | Facility: CLINIC | Age: 50
End: 2020-11-27

## 2020-11-27 NOTE — TELEPHONE ENCOUNTER
Has been taking medications and his BG is still no less than 300 for rest of day. Still in upper 200's he has only had one cup of coffee no sugar has had four bottles of water and has taken medication   Still not coming down             ----- Message from Bernie Mckay sent at 11/27/2020 12:27 PM CST -----  Regarding: Blood sugar levels  Contact: Patient  Please call patient concerning his blood sugar in the 300's with out eating breakfast,and he took his medication. Please call to advise at Ph .[phone] (home)

## 2020-12-10 RX ORDER — LIRAGLUTIDE 6 MG/ML
0.6 INJECTION SUBCUTANEOUS DAILY
Qty: 6 ML | Refills: 3 | Status: SHIPPED | OUTPATIENT
Start: 2020-12-10 | End: 2020-12-21 | Stop reason: SDUPTHER

## 2020-12-11 ENCOUNTER — PATIENT MESSAGE (OUTPATIENT)
Dept: ADMINISTRATIVE | Facility: OTHER | Age: 50
End: 2020-12-11

## 2020-12-11 ENCOUNTER — PATIENT MESSAGE (OUTPATIENT)
Dept: OTHER | Facility: OTHER | Age: 50
End: 2020-12-11

## 2020-12-21 ENCOUNTER — OFFICE VISIT (OUTPATIENT)
Dept: ENDOCRINOLOGY | Facility: CLINIC | Age: 50
End: 2020-12-21
Payer: MEDICAID

## 2020-12-21 VITALS
DIASTOLIC BLOOD PRESSURE: 93 MMHG | RESPIRATION RATE: 18 BRPM | BODY MASS INDEX: 38.48 KG/M2 | HEIGHT: 74 IN | SYSTOLIC BLOOD PRESSURE: 147 MMHG | WEIGHT: 299.81 LBS | HEART RATE: 76 BPM

## 2020-12-21 DIAGNOSIS — E11.65 UNCONTROLLED TYPE 2 DIABETES MELLITUS WITH HYPERGLYCEMIA: Primary | ICD-10-CM

## 2020-12-21 DIAGNOSIS — R73.09 ELEVATED HEMOGLOBIN A1C: ICD-10-CM

## 2020-12-21 DIAGNOSIS — I10 HYPERTENSION, UNSPECIFIED TYPE: ICD-10-CM

## 2020-12-21 PROCEDURE — 99999 PR PBB SHADOW E&M-EST. PATIENT-LVL III: CPT | Mod: PBBFAC,,, | Performed by: INTERNAL MEDICINE

## 2020-12-21 PROCEDURE — 99999 PR PBB SHADOW E&M-EST. PATIENT-LVL III: ICD-10-PCS | Mod: PBBFAC,,, | Performed by: INTERNAL MEDICINE

## 2020-12-21 PROCEDURE — 99214 PR OFFICE/OUTPT VISIT, EST, LEVL IV, 30-39 MIN: ICD-10-PCS | Mod: S$PBB,,, | Performed by: INTERNAL MEDICINE

## 2020-12-21 PROCEDURE — 99213 OFFICE O/P EST LOW 20 MIN: CPT | Mod: PBBFAC | Performed by: INTERNAL MEDICINE

## 2020-12-21 PROCEDURE — 99214 OFFICE O/P EST MOD 30 MIN: CPT | Mod: S$PBB,,, | Performed by: INTERNAL MEDICINE

## 2020-12-21 RX ORDER — LIRAGLUTIDE 6 MG/ML
1.2 INJECTION SUBCUTANEOUS DAILY
Qty: 6 ML | Refills: 4 | Status: SHIPPED | OUTPATIENT
Start: 2020-12-21 | End: 2020-12-21

## 2020-12-21 RX ORDER — METFORMIN HYDROCHLORIDE 1000 MG/1
TABLET ORAL
Qty: 180 TABLET | Refills: 1 | Status: SHIPPED | OUTPATIENT
Start: 2020-12-21 | End: 2021-11-05 | Stop reason: SDUPTHER

## 2020-12-21 RX ORDER — LIRAGLUTIDE 6 MG/ML
1.2 INJECTION SUBCUTANEOUS DAILY
Qty: 6 ML | Refills: 4 | Status: SHIPPED | OUTPATIENT
Start: 2020-12-21 | End: 2023-02-27

## 2020-12-21 RX ORDER — LIRAGLUTIDE 6 MG/ML
0.6 INJECTION SUBCUTANEOUS DAILY
Qty: 6 ML | Refills: 3 | Status: CANCELLED | OUTPATIENT
Start: 2020-12-21

## 2020-12-21 RX ORDER — METFORMIN HYDROCHLORIDE 1000 MG/1
TABLET ORAL
Qty: 180 TABLET | Refills: 1 | Status: SHIPPED | OUTPATIENT
Start: 2020-12-21 | End: 2020-12-21

## 2020-12-21 RX ORDER — LINAGLIPTIN 5 MG/1
5 TABLET, FILM COATED ORAL DAILY
Qty: 90 TABLET | Refills: 1 | Status: SHIPPED | OUTPATIENT
Start: 2020-12-21 | End: 2023-02-27

## 2020-12-21 RX ORDER — EMPAGLIFLOZIN 10 MG/1
10 TABLET, FILM COATED ORAL DAILY
Qty: 30 TABLET | Refills: 2 | Status: SHIPPED | OUTPATIENT
Start: 2020-12-21 | End: 2021-03-05 | Stop reason: SDUPTHER

## 2020-12-21 RX ORDER — EMPAGLIFLOZIN 10 MG/1
10 TABLET, FILM COATED ORAL DAILY
Qty: 30 TABLET | Refills: 3 | Status: SHIPPED | OUTPATIENT
Start: 2020-12-21 | End: 2020-12-21

## 2020-12-21 NOTE — PROGRESS NOTES
Diabetes follow up visit:    CBG readings have been:    Any low blood sugar/ hypoglycemia occurred since last visit? Patient been having highs and lows   Any changes on the diabetic medication since last visit? No     Is there a sensor? No   Downloaded?    Any significant changes in health occurred since last visit?  ( Hospitalization, surgery, etc) no     If a refill is requested for medication(s), pend the order(s)  victoza   Ibuprofen 800       Self referred   Patient ID: Jean Claude Ocasio is a 50 y.o. male.    Chief Complaint:    HPI  DM diagnosed: about 9 years ago  Off Januvia 50 mg  On Tradjenta qd  On Victoza 0.6 mg inj daily  Metformin 1000 mg bid  Glimperide 4 mg BID   History of intolerance to insulin ( Feeling changes, not feeling like himself after taking)    ---------------------------------  The following is a copy from last visit note;  PATIENT QUIT TAKING INSULIN INJECTIONS FEW DAYS AFTER LAST VISIT BECAUSE  HE WAS NOT FEELING WELL AND HE FELT LIKE A DIFFERENT PERSON, AND EVEN HIS  WIFE ASKED HIM WHAT WAS WRONG WITH HIM.  HIS BLOOD SUGAR PROBABLY WAS  IN 80S SOMETIMES WHEN HE WAS ON INSULIN.  IT IS NOT CLEAR IF HE HAD HYPOGLYCEMIA  HE DECIDED TO STOP THE INSULIN AND TO TAKE ONLY METFORMIN AND  HE STARTED TAKING GLYBURIDE 4 MG TWICE A DAY PER PATIENT  IT IS NOT CLEAR IF HE MEANS GLIMEPIRIDE INSTEAD OF GLYBURIDE  HIS BLOOD SUGAR RECENTLY HAS BEEN 100-170  NO CBG LOG AVAILABLE  --------------------------------    CBgs  Am usually 140-160 then CBgs goes up  Kidney problem: no  Pain or numbness in feet: no  ------------------   history of chronic back pain  No complaints of  dysphagia, n/v, cp, sob, abd pain, rash, or edema.     No FH of pancreatitis, or thyroid cancer        Patient is a .  Social History     Socioeconomic History    Marital status:      Spouse name: Not on file    Number of children: Not on file    Years of education: Not on file    Highest education level: Not  on file   Occupational History    Not on file   Social Needs    Financial resource strain: Not on file    Food insecurity     Worry: Not on file     Inability: Not on file    Transportation needs     Medical: Not on file     Non-medical: Not on file   Tobacco Use    Smoking status: Former Smoker   Substance and Sexual Activity    Alcohol use: Never     Frequency: Never    Drug use: Never    Sexual activity: Yes     Partners: Female   Lifestyle    Physical activity     Days per week: Not on file     Minutes per session: Not on file    Stress: Not on file   Relationships    Social connections     Talks on phone: Not on file     Gets together: Not on file     Attends Buddhist service: Not on file     Active member of club or organization: Not on file     Attends meetings of clubs or organizations: Not on file     Relationship status: Not on file   Other Topics Concern    Not on file   Social History Narrative    Not on file       ROS:   + Diabetes    no hypoglycemia occurred recently  No complaints of chest pain shortness breath  No complaints of nausea or vomiting  No ulcers in feet    PE:  Vitals:    12/21/20 0902   BP: (!) 147/93   Pulse: 76   Resp: 18     Alert and oriented  No acute distress  + Obesity  Vitiligo in different areas of the body  Body mass index is 38.5 kg/m².  No Proptosis or conjunctivitis  No goitre by inspection  Thyroid gland is not palpable  Heart reg, no gallop  Lungs cta, no wheezing  No edema in lower legs   no ulcers in feet  Speech normal  Behavior normal  No tremor    Lab Reviewed.    A/P  Type 2 diabetes mellitus with hyperglycemia  Uncontrolled diabetes  Elevated A1c  POCT Glucose 245  HTN  OFF INSULIN SEC TO SIDE EFFECTS   Tradjenta one tablet daily  Victoza DOSE  TO BE INCREASED TO 1.2 mg inj   Glimperide twice a day   Metformin twice a day.  Jardiance 10 mg tab daily.  Main effects including effect on blood pressure discussed.  The side effects   of infections, and  gangrene discussed.    Medications Ordered This Encounter   Medications    empagliflozin (JARDIANCE) 10 mg tablet     Sig: Take 1 tablet (10 mg total) by mouth once daily.     Dispense:  30 tablet     Refill:  2    liraglutide 0.6 mg/0.1 mL, 18 mg/3 mL, subq PNIJ (VICTOZA 2-PAUL) 0.6 mg/0.1 mL (18 mg/3 mL) PnIj pen     Sig: Inject 1.2 mg into the skin once daily.     Dispense:  6 mL     Refill:  4    metFORMIN (GLUCOPHAGE) 1000 MG tablet     Sig: Take 1 tablet by mouth twice daily     Dispense:  180 tablet     Refill:  1         Chronic lower back pain    Follow-up visit in 3 months.      Patient understands and agrees on the plan.

## 2021-01-22 ENCOUNTER — NURSE TRIAGE (OUTPATIENT)
Dept: ADMINISTRATIVE | Facility: CLINIC | Age: 51
End: 2021-01-22

## 2021-02-01 ENCOUNTER — TELEPHONE (OUTPATIENT)
Dept: INTERNAL MEDICINE | Facility: CLINIC | Age: 51
End: 2021-02-01

## 2021-03-04 ENCOUNTER — TELEPHONE (OUTPATIENT)
Dept: DIABETES | Facility: CLINIC | Age: 51
End: 2021-03-04

## 2021-03-05 ENCOUNTER — TELEPHONE (OUTPATIENT)
Dept: DIABETES | Facility: CLINIC | Age: 51
End: 2021-03-05

## 2021-03-05 ENCOUNTER — OFFICE VISIT (OUTPATIENT)
Dept: DIABETES | Facility: CLINIC | Age: 51
End: 2021-03-05
Payer: MEDICAID

## 2021-03-05 ENCOUNTER — LAB VISIT (OUTPATIENT)
Dept: LAB | Facility: HOSPITAL | Age: 51
End: 2021-03-05
Attending: PHYSICIAN ASSISTANT
Payer: MEDICAID

## 2021-03-05 VITALS
BODY MASS INDEX: 38.78 KG/M2 | DIASTOLIC BLOOD PRESSURE: 84 MMHG | HEART RATE: 79 BPM | WEIGHT: 302 LBS | SYSTOLIC BLOOD PRESSURE: 147 MMHG

## 2021-03-05 DIAGNOSIS — G47.33 OBSTRUCTIVE SLEEP APNEA SYNDROME: ICD-10-CM

## 2021-03-05 DIAGNOSIS — I10 ESSENTIAL HYPERTENSION: ICD-10-CM

## 2021-03-05 DIAGNOSIS — E11.65 TYPE 2 DIABETES MELLITUS WITH HYPERGLYCEMIA, WITHOUT LONG-TERM CURRENT USE OF INSULIN: ICD-10-CM

## 2021-03-05 DIAGNOSIS — E55.9 VITAMIN D DEFICIENCY: ICD-10-CM

## 2021-03-05 DIAGNOSIS — E11.69 DYSLIPIDEMIA ASSOCIATED WITH TYPE 2 DIABETES MELLITUS: ICD-10-CM

## 2021-03-05 DIAGNOSIS — E11.65 TYPE 2 DIABETES MELLITUS WITH HYPERGLYCEMIA, WITHOUT LONG-TERM CURRENT USE OF INSULIN: Primary | ICD-10-CM

## 2021-03-05 DIAGNOSIS — E66.01 CLASS 2 SEVERE OBESITY DUE TO EXCESS CALORIES WITH SERIOUS COMORBIDITY AND BODY MASS INDEX (BMI) OF 38.0 TO 38.9 IN ADULT: ICD-10-CM

## 2021-03-05 DIAGNOSIS — K76.0 NAFLD (NONALCOHOLIC FATTY LIVER DISEASE): ICD-10-CM

## 2021-03-05 DIAGNOSIS — E78.5 DYSLIPIDEMIA ASSOCIATED WITH TYPE 2 DIABETES MELLITUS: ICD-10-CM

## 2021-03-05 DIAGNOSIS — E11.42 DIABETIC POLYNEUROPATHY ASSOCIATED WITH TYPE 2 DIABETES MELLITUS: ICD-10-CM

## 2021-03-05 LAB
25(OH)D3+25(OH)D2 SERPL-MCNC: 27 NG/ML (ref 30–96)
BASOPHILS # BLD AUTO: 0 K/UL (ref 0–0.2)
BASOPHILS NFR BLD: 0 % (ref 0–1.9)
DIFFERENTIAL METHOD: ABNORMAL
EOSINOPHIL # BLD AUTO: 0 K/UL (ref 0–0.5)
EOSINOPHIL NFR BLD: 0 % (ref 0–8)
ERYTHROCYTE [DISTWIDTH] IN BLOOD BY AUTOMATED COUNT: 13.2 % (ref 11.5–14.5)
HCT VFR BLD AUTO: 42.2 % (ref 40–54)
HGB BLD-MCNC: 13.8 G/DL (ref 14–18)
IMM GRANULOCYTES # BLD AUTO: 0.02 K/UL (ref 0–0.04)
IMM GRANULOCYTES NFR BLD AUTO: 0.3 % (ref 0–0.5)
LYMPHOCYTES # BLD AUTO: 1.5 K/UL (ref 1–4.8)
LYMPHOCYTES NFR BLD: 25.3 % (ref 18–48)
MCH RBC QN AUTO: 28.5 PG (ref 27–31)
MCHC RBC AUTO-ENTMCNC: 32.7 G/DL (ref 32–36)
MCV RBC AUTO: 87 FL (ref 82–98)
MONOCYTES # BLD AUTO: 0.4 K/UL (ref 0.3–1)
MONOCYTES NFR BLD: 6.8 % (ref 4–15)
NEUTROPHILS # BLD AUTO: 4 K/UL (ref 1.8–7.7)
NEUTROPHILS NFR BLD: 67.6 % (ref 38–73)
NRBC BLD-RTO: 0 /100 WBC
PLATELET # BLD AUTO: 202 K/UL (ref 150–350)
PMV BLD AUTO: 11.8 FL (ref 9.2–12.9)
RBC # BLD AUTO: 4.84 M/UL (ref 4.6–6.2)
WBC # BLD AUTO: 5.89 K/UL (ref 3.9–12.7)

## 2021-03-05 PROCEDURE — 83036 HEMOGLOBIN GLYCOSYLATED A1C: CPT | Performed by: PHYSICIAN ASSISTANT

## 2021-03-05 PROCEDURE — 99999 PR PBB SHADOW E&M-EST. PATIENT-LVL III: ICD-10-PCS | Mod: PBBFAC,,, | Performed by: PHYSICIAN ASSISTANT

## 2021-03-05 PROCEDURE — 99215 PR OFFICE/OUTPT VISIT, EST, LEVL V, 40-54 MIN: ICD-10-PCS | Mod: S$PBB,,, | Performed by: PHYSICIAN ASSISTANT

## 2021-03-05 PROCEDURE — 84443 ASSAY THYROID STIM HORMONE: CPT | Performed by: PHYSICIAN ASSISTANT

## 2021-03-05 PROCEDURE — 85025 COMPLETE CBC W/AUTO DIFF WBC: CPT | Performed by: PHYSICIAN ASSISTANT

## 2021-03-05 PROCEDURE — 80061 LIPID PANEL: CPT | Performed by: PHYSICIAN ASSISTANT

## 2021-03-05 PROCEDURE — 82306 VITAMIN D 25 HYDROXY: CPT | Performed by: PHYSICIAN ASSISTANT

## 2021-03-05 PROCEDURE — 80053 COMPREHEN METABOLIC PANEL: CPT | Performed by: PHYSICIAN ASSISTANT

## 2021-03-05 PROCEDURE — 36415 COLL VENOUS BLD VENIPUNCTURE: CPT | Mod: PO | Performed by: PHYSICIAN ASSISTANT

## 2021-03-05 PROCEDURE — 99999 PR PBB SHADOW E&M-EST. PATIENT-LVL III: CPT | Mod: PBBFAC,,, | Performed by: PHYSICIAN ASSISTANT

## 2021-03-05 PROCEDURE — 99213 OFFICE O/P EST LOW 20 MIN: CPT | Mod: PBBFAC,PO | Performed by: PHYSICIAN ASSISTANT

## 2021-03-05 PROCEDURE — 99215 OFFICE O/P EST HI 40 MIN: CPT | Mod: S$PBB,,, | Performed by: PHYSICIAN ASSISTANT

## 2021-03-05 RX ORDER — GLIMEPIRIDE 4 MG/1
TABLET ORAL
Qty: 180 TABLET | Refills: 0 | Status: SHIPPED | OUTPATIENT
Start: 2021-03-05 | End: 2021-04-29

## 2021-03-05 RX ORDER — EMPAGLIFLOZIN 10 MG/1
10 TABLET, FILM COATED ORAL DAILY
Qty: 30 TABLET | Refills: 11 | Status: SHIPPED | OUTPATIENT
Start: 2021-03-05 | End: 2021-03-05

## 2021-03-05 RX ORDER — FLASH GLUCOSE SENSOR
1 KIT MISCELLANEOUS
Qty: 2 KIT | Refills: 11 | Status: SHIPPED | OUTPATIENT
Start: 2021-03-05 | End: 2021-07-13 | Stop reason: SDUPTHER

## 2021-03-05 RX ORDER — EMPAGLIFLOZIN 10 MG/1
10 TABLET, FILM COATED ORAL DAILY
Qty: 30 TABLET | Refills: 11 | Status: SHIPPED | OUTPATIENT
Start: 2021-03-05 | End: 2021-09-09 | Stop reason: SINTOL

## 2021-03-06 LAB
ALBUMIN SERPL BCP-MCNC: 3.9 G/DL (ref 3.5–5.2)
ALP SERPL-CCNC: 71 U/L (ref 55–135)
ALT SERPL W/O P-5'-P-CCNC: 49 U/L (ref 10–44)
ANION GAP SERPL CALC-SCNC: 8 MMOL/L (ref 8–16)
AST SERPL-CCNC: 42 U/L (ref 10–40)
BILIRUB SERPL-MCNC: 0.4 MG/DL (ref 0.1–1)
BUN SERPL-MCNC: 11 MG/DL (ref 6–20)
CALCIUM SERPL-MCNC: 9.7 MG/DL (ref 8.7–10.5)
CHLORIDE SERPL-SCNC: 100 MMOL/L (ref 95–110)
CHOLEST SERPL-MCNC: 125 MG/DL (ref 120–199)
CHOLEST/HDLC SERPL: 3.1 {RATIO} (ref 2–5)
CO2 SERPL-SCNC: 27 MMOL/L (ref 23–29)
CREAT SERPL-MCNC: 0.9 MG/DL (ref 0.5–1.4)
EST. GFR  (AFRICAN AMERICAN): >60 ML/MIN/1.73 M^2
EST. GFR  (NON AFRICAN AMERICAN): >60 ML/MIN/1.73 M^2
ESTIMATED AVG GLUCOSE: 206 MG/DL (ref 68–131)
GLUCOSE SERPL-MCNC: 285 MG/DL (ref 70–110)
HBA1C MFR BLD: 8.8 % (ref 4–5.6)
HDLC SERPL-MCNC: 40 MG/DL (ref 40–75)
HDLC SERPL: 32 % (ref 20–50)
LDLC SERPL CALC-MCNC: 65 MG/DL (ref 63–159)
NONHDLC SERPL-MCNC: 85 MG/DL
POTASSIUM SERPL-SCNC: 4.3 MMOL/L (ref 3.5–5.1)
PROT SERPL-MCNC: 7.5 G/DL (ref 6–8.4)
SODIUM SERPL-SCNC: 135 MMOL/L (ref 136–145)
TRIGL SERPL-MCNC: 100 MG/DL (ref 30–150)
TSH SERPL DL<=0.005 MIU/L-ACNC: 1.2 UIU/ML (ref 0.4–4)

## 2021-03-15 ENCOUNTER — PATIENT MESSAGE (OUTPATIENT)
Dept: DIABETES | Facility: CLINIC | Age: 51
End: 2021-03-15

## 2021-03-25 LAB
LEFT EYE DM RETINOPATHY: NEGATIVE
RIGHT EYE DM RETINOPATHY: NEGATIVE

## 2021-03-26 ENCOUNTER — CLINICAL SUPPORT (OUTPATIENT)
Dept: DIABETES | Facility: CLINIC | Age: 51
End: 2021-03-26
Payer: MEDICAID

## 2021-03-26 DIAGNOSIS — E11.65 TYPE 2 DIABETES MELLITUS WITH HYPERGLYCEMIA, WITHOUT LONG-TERM CURRENT USE OF INSULIN: Primary | ICD-10-CM

## 2021-03-27 ENCOUNTER — TELEPHONE (OUTPATIENT)
Dept: INTERNAL MEDICINE | Facility: CLINIC | Age: 51
End: 2021-03-27

## 2021-03-27 DIAGNOSIS — E11.69 TYPE 2 DIABETES MELLITUS WITH OTHER SPECIFIED COMPLICATION, WITHOUT LONG-TERM CURRENT USE OF INSULIN: Primary | ICD-10-CM

## 2021-04-26 ENCOUNTER — PATIENT MESSAGE (OUTPATIENT)
Dept: DIABETES | Facility: CLINIC | Age: 51
End: 2021-04-26

## 2021-04-26 ENCOUNTER — TELEPHONE (OUTPATIENT)
Dept: DIABETES | Facility: CLINIC | Age: 51
End: 2021-04-26

## 2021-04-29 RX ORDER — GLIMEPIRIDE 4 MG/1
TABLET ORAL
Qty: 60 TABLET | Refills: 1 | Status: SHIPPED | OUTPATIENT
Start: 2021-04-29 | End: 2021-05-28 | Stop reason: SDUPTHER

## 2021-05-03 ENCOUNTER — TELEPHONE (OUTPATIENT)
Dept: RADIOLOGY | Facility: HOSPITAL | Age: 51
End: 2021-05-03

## 2021-05-04 ENCOUNTER — HOSPITAL ENCOUNTER (OUTPATIENT)
Dept: RADIOLOGY | Facility: HOSPITAL | Age: 51
Discharge: HOME | End: 2021-05-04
Attending: NURSE PRACTITIONER
Payer: MEDICAID

## 2021-05-04 DIAGNOSIS — K76.0 NAFLD (NONALCOHOLIC FATTY LIVER DISEASE): ICD-10-CM

## 2021-05-04 DIAGNOSIS — D73.89 LESION OF SPLEEN: ICD-10-CM

## 2021-05-04 DIAGNOSIS — R16.2 HEPATOSPLENOMEGALY: ICD-10-CM

## 2021-05-04 PROCEDURE — 76700 US EXAM ABDOM COMPLETE: CPT | Mod: 26,,, | Performed by: RADIOLOGY

## 2021-05-04 PROCEDURE — 76700 US ABDOMEN COMPLETE: ICD-10-PCS | Mod: 26,,, | Performed by: RADIOLOGY

## 2021-05-04 PROCEDURE — 76700 US EXAM ABDOM COMPLETE: CPT | Mod: TC

## 2021-05-27 ENCOUNTER — PATIENT OUTREACH (OUTPATIENT)
Dept: ADMINISTRATIVE | Facility: OTHER | Age: 51
End: 2021-05-27

## 2021-05-28 ENCOUNTER — PATIENT OUTREACH (OUTPATIENT)
Dept: ADMINISTRATIVE | Facility: HOSPITAL | Age: 51
End: 2021-05-28

## 2021-05-28 ENCOUNTER — LAB VISIT (OUTPATIENT)
Dept: LAB | Facility: HOSPITAL | Age: 51
End: 2021-05-28
Attending: PHYSICIAN ASSISTANT
Payer: MEDICAID

## 2021-05-28 ENCOUNTER — OFFICE VISIT (OUTPATIENT)
Dept: DIABETES | Facility: CLINIC | Age: 51
End: 2021-05-28
Payer: MEDICAID

## 2021-05-28 VITALS
HEART RATE: 82 BPM | HEIGHT: 74 IN | WEIGHT: 296.75 LBS | BODY MASS INDEX: 38.08 KG/M2 | DIASTOLIC BLOOD PRESSURE: 85 MMHG | SYSTOLIC BLOOD PRESSURE: 142 MMHG

## 2021-05-28 DIAGNOSIS — E11.42 DIABETIC POLYNEUROPATHY ASSOCIATED WITH TYPE 2 DIABETES MELLITUS: ICD-10-CM

## 2021-05-28 DIAGNOSIS — E11.65 TYPE 2 DIABETES MELLITUS WITH HYPERGLYCEMIA, WITHOUT LONG-TERM CURRENT USE OF INSULIN: Primary | ICD-10-CM

## 2021-05-28 DIAGNOSIS — I10 ESSENTIAL HYPERTENSION: ICD-10-CM

## 2021-05-28 DIAGNOSIS — E78.5 DYSLIPIDEMIA ASSOCIATED WITH TYPE 2 DIABETES MELLITUS: ICD-10-CM

## 2021-05-28 DIAGNOSIS — K76.0 NAFLD (NONALCOHOLIC FATTY LIVER DISEASE): ICD-10-CM

## 2021-05-28 DIAGNOSIS — E66.01 CLASS 2 SEVERE OBESITY DUE TO EXCESS CALORIES WITH SERIOUS COMORBIDITY AND BODY MASS INDEX (BMI) OF 38.0 TO 38.9 IN ADULT: ICD-10-CM

## 2021-05-28 DIAGNOSIS — E11.65 TYPE 2 DIABETES MELLITUS WITH HYPERGLYCEMIA, WITHOUT LONG-TERM CURRENT USE OF INSULIN: ICD-10-CM

## 2021-05-28 DIAGNOSIS — E11.69 DYSLIPIDEMIA ASSOCIATED WITH TYPE 2 DIABETES MELLITUS: ICD-10-CM

## 2021-05-28 DIAGNOSIS — G47.33 OBSTRUCTIVE SLEEP APNEA SYNDROME: ICD-10-CM

## 2021-05-28 PROCEDURE — 36415 COLL VENOUS BLD VENIPUNCTURE: CPT | Mod: PO | Performed by: PHYSICIAN ASSISTANT

## 2021-05-28 PROCEDURE — 99214 OFFICE O/P EST MOD 30 MIN: CPT | Mod: PBBFAC,PO | Performed by: PHYSICIAN ASSISTANT

## 2021-05-28 PROCEDURE — 99214 PR OFFICE/OUTPT VISIT, EST, LEVL IV, 30-39 MIN: ICD-10-PCS | Mod: S$PBB,,, | Performed by: PHYSICIAN ASSISTANT

## 2021-05-28 PROCEDURE — 83036 HEMOGLOBIN GLYCOSYLATED A1C: CPT | Performed by: PHYSICIAN ASSISTANT

## 2021-05-28 PROCEDURE — 99214 OFFICE O/P EST MOD 30 MIN: CPT | Mod: S$PBB,,, | Performed by: PHYSICIAN ASSISTANT

## 2021-05-28 PROCEDURE — 99999 PR PBB SHADOW E&M-EST. PATIENT-LVL IV: ICD-10-PCS | Mod: PBBFAC,,, | Performed by: PHYSICIAN ASSISTANT

## 2021-05-28 PROCEDURE — 99999 PR PBB SHADOW E&M-EST. PATIENT-LVL IV: CPT | Mod: PBBFAC,,, | Performed by: PHYSICIAN ASSISTANT

## 2021-05-28 RX ORDER — GLIMEPIRIDE 4 MG/1
TABLET ORAL
Qty: 180 TABLET | Refills: 3 | Status: SHIPPED | OUTPATIENT
Start: 2021-05-28 | End: 2021-07-09 | Stop reason: SDUPTHER

## 2021-05-28 RX ORDER — DICLOFENAC SODIUM 30 MG/G
GEL TOPICAL
COMMUNITY
Start: 2021-05-24

## 2021-05-29 LAB
ESTIMATED AVG GLUCOSE: 157 MG/DL (ref 68–131)
HBA1C MFR BLD: 7.1 % (ref 4–5.6)

## 2021-06-03 ENCOUNTER — OFFICE VISIT (OUTPATIENT)
Dept: DIABETES | Facility: CLINIC | Age: 51
End: 2021-06-03
Payer: MEDICAID

## 2021-06-03 DIAGNOSIS — K76.0 NAFLD (NONALCOHOLIC FATTY LIVER DISEASE): ICD-10-CM

## 2021-06-03 DIAGNOSIS — G47.33 OBSTRUCTIVE SLEEP APNEA SYNDROME: ICD-10-CM

## 2021-06-03 DIAGNOSIS — E11.42 DIABETIC POLYNEUROPATHY ASSOCIATED WITH TYPE 2 DIABETES MELLITUS: ICD-10-CM

## 2021-06-03 DIAGNOSIS — I10 ESSENTIAL HYPERTENSION: ICD-10-CM

## 2021-06-03 DIAGNOSIS — E78.5 DYSLIPIDEMIA ASSOCIATED WITH TYPE 2 DIABETES MELLITUS: ICD-10-CM

## 2021-06-03 DIAGNOSIS — E11.69 DYSLIPIDEMIA ASSOCIATED WITH TYPE 2 DIABETES MELLITUS: ICD-10-CM

## 2021-06-03 DIAGNOSIS — E11.65 TYPE 2 DIABETES MELLITUS WITH HYPERGLYCEMIA, WITHOUT LONG-TERM CURRENT USE OF INSULIN: Primary | ICD-10-CM

## 2021-06-03 DIAGNOSIS — E66.01 CLASS 2 SEVERE OBESITY DUE TO EXCESS CALORIES WITH SERIOUS COMORBIDITY AND BODY MASS INDEX (BMI) OF 38.0 TO 38.9 IN ADULT: ICD-10-CM

## 2021-06-03 PROCEDURE — 99499 NO LOS: ICD-10-PCS | Mod: 95,,, | Performed by: PHYSICIAN ASSISTANT

## 2021-06-03 PROCEDURE — 99499 UNLISTED E&M SERVICE: CPT | Mod: 95,,, | Performed by: PHYSICIAN ASSISTANT

## 2021-06-03 RX ORDER — PIOGLITAZONEHYDROCHLORIDE 15 MG/1
15 TABLET ORAL DAILY
Qty: 30 TABLET | Refills: 11 | Status: SHIPPED | OUTPATIENT
Start: 2021-06-03 | End: 2022-09-30 | Stop reason: SDUPTHER

## 2021-07-06 ENCOUNTER — PATIENT MESSAGE (OUTPATIENT)
Dept: DIABETES | Facility: CLINIC | Age: 51
End: 2021-07-06

## 2021-07-06 DIAGNOSIS — E11.65 TYPE 2 DIABETES MELLITUS WITH HYPERGLYCEMIA, WITHOUT LONG-TERM CURRENT USE OF INSULIN: ICD-10-CM

## 2021-07-06 RX ORDER — GLIMEPIRIDE 4 MG/1
TABLET ORAL
Qty: 180 TABLET | Refills: 3 | Status: CANCELLED | OUTPATIENT
Start: 2021-07-06

## 2021-07-09 DIAGNOSIS — E11.65 TYPE 2 DIABETES MELLITUS WITH HYPERGLYCEMIA, WITHOUT LONG-TERM CURRENT USE OF INSULIN: ICD-10-CM

## 2021-07-09 RX ORDER — PEN NEEDLE, DIABETIC 30 GX3/16"
NEEDLE, DISPOSABLE MISCELLANEOUS
Qty: 100 EACH | Refills: 2 | Status: SHIPPED | OUTPATIENT
Start: 2021-07-09 | End: 2022-01-25

## 2021-07-09 RX ORDER — GLIMEPIRIDE 4 MG/1
TABLET ORAL
Qty: 180 TABLET | Refills: 3 | Status: SHIPPED | OUTPATIENT
Start: 2021-07-09 | End: 2021-07-13 | Stop reason: SDUPTHER

## 2021-07-09 RX ORDER — BLOOD-GLUCOSE CONTROL, NORMAL
EACH MISCELLANEOUS
Qty: 150 EACH | Refills: 5 | Status: SHIPPED | OUTPATIENT
Start: 2021-07-09 | End: 2021-07-13 | Stop reason: SDUPTHER

## 2021-07-09 RX ORDER — INSULIN PUMP SYRINGE, 3 ML
EACH MISCELLANEOUS
Qty: 1 EACH | Refills: 0 | Status: SHIPPED | OUTPATIENT
Start: 2021-07-09 | End: 2022-07-20

## 2021-07-13 DIAGNOSIS — E11.65 TYPE 2 DIABETES MELLITUS WITH HYPERGLYCEMIA, WITHOUT LONG-TERM CURRENT USE OF INSULIN: ICD-10-CM

## 2021-07-14 ENCOUNTER — TELEPHONE (OUTPATIENT)
Dept: DIABETES | Facility: CLINIC | Age: 51
End: 2021-07-14

## 2021-07-14 RX ORDER — BLOOD-GLUCOSE CONTROL, NORMAL
EACH MISCELLANEOUS
Qty: 150 EACH | Refills: 5 | Status: SHIPPED | OUTPATIENT
Start: 2021-07-14 | End: 2022-01-25

## 2021-07-14 RX ORDER — GLIMEPIRIDE 4 MG/1
TABLET ORAL
Qty: 180 TABLET | Refills: 3 | Status: SHIPPED | OUTPATIENT
Start: 2021-07-14 | End: 2022-09-08

## 2021-07-14 RX ORDER — FLASH GLUCOSE SENSOR
1 KIT MISCELLANEOUS
Qty: 2 KIT | Refills: 11 | Status: SHIPPED | OUTPATIENT
Start: 2021-07-14 | End: 2021-11-05 | Stop reason: SDUPTHER

## 2021-07-16 ENCOUNTER — PATIENT OUTREACH (OUTPATIENT)
Dept: ADMINISTRATIVE | Facility: HOSPITAL | Age: 51
End: 2021-07-16

## 2021-09-02 ENCOUNTER — LAB VISIT (OUTPATIENT)
Dept: LAB | Facility: HOSPITAL | Age: 51
End: 2021-09-02
Attending: PHYSICIAN ASSISTANT
Payer: MEDICAID

## 2021-09-02 DIAGNOSIS — E11.65 TYPE 2 DIABETES MELLITUS WITH HYPERGLYCEMIA, WITHOUT LONG-TERM CURRENT USE OF INSULIN: ICD-10-CM

## 2021-09-02 LAB
ALBUMIN SERPL BCP-MCNC: 3.8 G/DL (ref 3.5–5.2)
ALP SERPL-CCNC: 57 U/L (ref 55–135)
ALT SERPL W/O P-5'-P-CCNC: 61 U/L (ref 10–44)
ANION GAP SERPL CALC-SCNC: 11 MMOL/L (ref 8–16)
AST SERPL-CCNC: 42 U/L (ref 10–40)
BILIRUB SERPL-MCNC: 0.6 MG/DL (ref 0.1–1)
BUN SERPL-MCNC: 8 MG/DL (ref 6–20)
CALCIUM SERPL-MCNC: 9.6 MG/DL (ref 8.7–10.5)
CHLORIDE SERPL-SCNC: 100 MMOL/L (ref 95–110)
CO2 SERPL-SCNC: 24 MMOL/L (ref 23–29)
CREAT SERPL-MCNC: 0.8 MG/DL (ref 0.5–1.4)
EST. GFR  (AFRICAN AMERICAN): >60 ML/MIN/1.73 M^2
EST. GFR  (NON AFRICAN AMERICAN): >60 ML/MIN/1.73 M^2
ESTIMATED AVG GLUCOSE: 186 MG/DL (ref 68–131)
GLUCOSE SERPL-MCNC: 164 MG/DL (ref 70–110)
HBA1C MFR BLD: 8.1 % (ref 4–5.6)
POTASSIUM SERPL-SCNC: 4.4 MMOL/L (ref 3.5–5.1)
PROT SERPL-MCNC: 7.3 G/DL (ref 6–8.4)
SODIUM SERPL-SCNC: 135 MMOL/L (ref 136–145)

## 2021-09-02 PROCEDURE — 83036 HEMOGLOBIN GLYCOSYLATED A1C: CPT | Performed by: PHYSICIAN ASSISTANT

## 2021-09-02 PROCEDURE — 36415 COLL VENOUS BLD VENIPUNCTURE: CPT | Performed by: PHYSICIAN ASSISTANT

## 2021-09-02 PROCEDURE — 80053 COMPREHEN METABOLIC PANEL: CPT | Performed by: PHYSICIAN ASSISTANT

## 2021-09-09 ENCOUNTER — OFFICE VISIT (OUTPATIENT)
Dept: DIABETES | Facility: CLINIC | Age: 51
End: 2021-09-09
Payer: MEDICAID

## 2021-09-09 DIAGNOSIS — I10 ESSENTIAL HYPERTENSION: ICD-10-CM

## 2021-09-09 DIAGNOSIS — G47.33 OBSTRUCTIVE SLEEP APNEA SYNDROME: ICD-10-CM

## 2021-09-09 DIAGNOSIS — E66.01 CLASS 2 SEVERE OBESITY DUE TO EXCESS CALORIES WITH SERIOUS COMORBIDITY AND BODY MASS INDEX (BMI) OF 38.0 TO 38.9 IN ADULT: ICD-10-CM

## 2021-09-09 DIAGNOSIS — K76.0 NAFLD (NONALCOHOLIC FATTY LIVER DISEASE): ICD-10-CM

## 2021-09-09 DIAGNOSIS — E11.65 TYPE 2 DIABETES MELLITUS WITH HYPERGLYCEMIA, WITHOUT LONG-TERM CURRENT USE OF INSULIN: Primary | ICD-10-CM

## 2021-09-09 DIAGNOSIS — E11.42 DIABETIC POLYNEUROPATHY ASSOCIATED WITH TYPE 2 DIABETES MELLITUS: ICD-10-CM

## 2021-09-09 DIAGNOSIS — E11.69 DYSLIPIDEMIA ASSOCIATED WITH TYPE 2 DIABETES MELLITUS: ICD-10-CM

## 2021-09-09 DIAGNOSIS — E78.5 DYSLIPIDEMIA ASSOCIATED WITH TYPE 2 DIABETES MELLITUS: ICD-10-CM

## 2021-09-09 PROCEDURE — 99213 OFFICE O/P EST LOW 20 MIN: CPT | Mod: 95,,, | Performed by: PHYSICIAN ASSISTANT

## 2021-09-09 PROCEDURE — 99213 PR OFFICE/OUTPT VISIT, EST, LEVL III, 20-29 MIN: ICD-10-PCS | Mod: 95,,, | Performed by: PHYSICIAN ASSISTANT

## 2021-09-09 RX ORDER — DAPAGLIFLOZIN 5 MG/1
5 TABLET, FILM COATED ORAL DAILY
Qty: 30 TABLET | Refills: 11 | Status: SHIPPED | OUTPATIENT
Start: 2021-09-09 | End: 2021-11-05

## 2021-09-21 ENCOUNTER — HOSPITAL ENCOUNTER (OUTPATIENT)
Dept: RADIOLOGY | Facility: HOSPITAL | Age: 51
Discharge: HOME | End: 2021-09-21
Attending: FAMILY MEDICINE
Payer: MEDICAID

## 2021-09-21 DIAGNOSIS — D73.89 LESION OF SPLEEN: ICD-10-CM

## 2021-09-21 DIAGNOSIS — R16.2 HEPATOSPLENOMEGALY: ICD-10-CM

## 2021-09-21 PROCEDURE — 76705 ECHO EXAM OF ABDOMEN: CPT | Mod: TC

## 2021-09-21 PROCEDURE — 76705 US ABDOMEN LIMITED: ICD-10-PCS | Mod: 26,,, | Performed by: RADIOLOGY

## 2021-09-21 PROCEDURE — 76705 ECHO EXAM OF ABDOMEN: CPT | Mod: 26,,, | Performed by: RADIOLOGY

## 2021-10-12 ENCOUNTER — PATIENT OUTREACH (OUTPATIENT)
Dept: ADMINISTRATIVE | Facility: OTHER | Age: 51
End: 2021-10-12

## 2021-10-12 DIAGNOSIS — Z12.11 ENCOUNTER FOR FIT (FECAL IMMUNOCHEMICAL TEST) SCREENING: Primary | ICD-10-CM

## 2021-11-05 ENCOUNTER — OFFICE VISIT (OUTPATIENT)
Dept: DIABETES | Facility: CLINIC | Age: 51
End: 2021-11-05
Payer: MEDICAID

## 2021-11-05 VITALS
WEIGHT: 297.63 LBS | DIASTOLIC BLOOD PRESSURE: 80 MMHG | BODY MASS INDEX: 38.21 KG/M2 | SYSTOLIC BLOOD PRESSURE: 160 MMHG

## 2021-11-05 DIAGNOSIS — I10 ESSENTIAL HYPERTENSION: ICD-10-CM

## 2021-11-05 DIAGNOSIS — E11.42 DIABETIC POLYNEUROPATHY ASSOCIATED WITH TYPE 2 DIABETES MELLITUS: ICD-10-CM

## 2021-11-05 DIAGNOSIS — E66.01 CLASS 2 SEVERE OBESITY DUE TO EXCESS CALORIES WITH SERIOUS COMORBIDITY AND BODY MASS INDEX (BMI) OF 38.0 TO 38.9 IN ADULT: ICD-10-CM

## 2021-11-05 DIAGNOSIS — G47.33 OBSTRUCTIVE SLEEP APNEA SYNDROME: ICD-10-CM

## 2021-11-05 DIAGNOSIS — K76.0 NAFLD (NONALCOHOLIC FATTY LIVER DISEASE): ICD-10-CM

## 2021-11-05 DIAGNOSIS — E11.65 TYPE 2 DIABETES MELLITUS WITH HYPERGLYCEMIA, WITHOUT LONG-TERM CURRENT USE OF INSULIN: Primary | ICD-10-CM

## 2021-11-05 DIAGNOSIS — E11.69 DYSLIPIDEMIA ASSOCIATED WITH TYPE 2 DIABETES MELLITUS: ICD-10-CM

## 2021-11-05 DIAGNOSIS — E78.5 DYSLIPIDEMIA ASSOCIATED WITH TYPE 2 DIABETES MELLITUS: ICD-10-CM

## 2021-11-05 PROCEDURE — 99999 PR PBB SHADOW E&M-EST. PATIENT-LVL III: CPT | Mod: PBBFAC,,, | Performed by: PHYSICIAN ASSISTANT

## 2021-11-05 PROCEDURE — 99214 OFFICE O/P EST MOD 30 MIN: CPT | Mod: S$PBB,,, | Performed by: PHYSICIAN ASSISTANT

## 2021-11-05 PROCEDURE — 99999 PR PBB SHADOW E&M-EST. PATIENT-LVL III: ICD-10-PCS | Mod: PBBFAC,,, | Performed by: PHYSICIAN ASSISTANT

## 2021-11-05 PROCEDURE — 99213 OFFICE O/P EST LOW 20 MIN: CPT | Mod: PBBFAC,PO | Performed by: PHYSICIAN ASSISTANT

## 2021-11-05 PROCEDURE — 99214 PR OFFICE/OUTPT VISIT, EST, LEVL IV, 30-39 MIN: ICD-10-PCS | Mod: S$PBB,,, | Performed by: PHYSICIAN ASSISTANT

## 2021-11-05 RX ORDER — FLASH GLUCOSE SENSOR
1 KIT MISCELLANEOUS
Qty: 2 KIT | Refills: 11 | Status: SHIPPED | OUTPATIENT
Start: 2021-11-05 | End: 2022-01-25

## 2021-11-05 RX ORDER — METFORMIN HYDROCHLORIDE 1000 MG/1
TABLET ORAL
Qty: 180 TABLET | Refills: 1 | Status: SHIPPED | OUTPATIENT
Start: 2021-11-05 | End: 2022-04-25

## 2021-11-05 RX ORDER — DAPAGLIFLOZIN 10 MG/1
10 TABLET, FILM COATED ORAL DAILY
Qty: 30 TABLET | Refills: 11 | Status: SHIPPED | OUTPATIENT
Start: 2021-11-05 | End: 2022-09-21 | Stop reason: SDUPTHER

## 2021-11-12 RX ORDER — METFORMIN HYDROCHLORIDE 1000 MG/1
TABLET ORAL
Qty: 180 TABLET | Refills: 0 | OUTPATIENT
Start: 2021-11-12

## 2021-11-16 ENCOUNTER — PATIENT OUTREACH (OUTPATIENT)
Dept: ADMINISTRATIVE | Facility: HOSPITAL | Age: 51
End: 2021-11-16
Payer: MEDICAID

## 2021-12-17 DIAGNOSIS — E78.5 DYSLIPIDEMIA ASSOCIATED WITH TYPE 2 DIABETES MELLITUS: ICD-10-CM

## 2021-12-17 DIAGNOSIS — E11.69 DYSLIPIDEMIA ASSOCIATED WITH TYPE 2 DIABETES MELLITUS: ICD-10-CM

## 2021-12-20 ENCOUNTER — LAB VISIT (OUTPATIENT)
Dept: LAB | Facility: HOSPITAL | Age: 51
End: 2021-12-20
Attending: PHYSICIAN ASSISTANT
Payer: MEDICAID

## 2021-12-20 DIAGNOSIS — E11.65 TYPE 2 DIABETES MELLITUS WITH HYPERGLYCEMIA, WITHOUT LONG-TERM CURRENT USE OF INSULIN: ICD-10-CM

## 2021-12-20 PROCEDURE — 80053 COMPREHEN METABOLIC PANEL: CPT | Performed by: PHYSICIAN ASSISTANT

## 2021-12-20 PROCEDURE — 83036 HEMOGLOBIN GLYCOSYLATED A1C: CPT | Performed by: PHYSICIAN ASSISTANT

## 2021-12-20 PROCEDURE — 36415 COLL VENOUS BLD VENIPUNCTURE: CPT | Performed by: PHYSICIAN ASSISTANT

## 2021-12-20 RX ORDER — ATORVASTATIN CALCIUM 20 MG/1
TABLET, FILM COATED ORAL
Qty: 90 TABLET | Refills: 0 | OUTPATIENT
Start: 2021-12-20

## 2021-12-21 LAB
ALBUMIN SERPL BCP-MCNC: 4.2 G/DL (ref 3.5–5.2)
ALP SERPL-CCNC: 79 U/L (ref 55–135)
ALT SERPL W/O P-5'-P-CCNC: 41 U/L (ref 10–44)
ANION GAP SERPL CALC-SCNC: 12 MMOL/L (ref 8–16)
AST SERPL-CCNC: 27 U/L (ref 10–40)
BILIRUB SERPL-MCNC: 0.3 MG/DL (ref 0.1–1)
BUN SERPL-MCNC: 14 MG/DL (ref 6–20)
CALCIUM SERPL-MCNC: 10.3 MG/DL (ref 8.7–10.5)
CHLORIDE SERPL-SCNC: 101 MMOL/L (ref 95–110)
CO2 SERPL-SCNC: 27 MMOL/L (ref 23–29)
CREAT SERPL-MCNC: 0.7 MG/DL (ref 0.5–1.4)
EST. GFR  (AFRICAN AMERICAN): >60 ML/MIN/1.73 M^2
EST. GFR  (NON AFRICAN AMERICAN): >60 ML/MIN/1.73 M^2
ESTIMATED AVG GLUCOSE: 183 MG/DL (ref 68–131)
GLUCOSE SERPL-MCNC: 172 MG/DL (ref 70–110)
HBA1C MFR BLD: 8 % (ref 4–5.6)
POTASSIUM SERPL-SCNC: 4.1 MMOL/L (ref 3.5–5.1)
PROT SERPL-MCNC: 7.9 G/DL (ref 6–8.4)
SODIUM SERPL-SCNC: 140 MMOL/L (ref 136–145)

## 2021-12-22 ENCOUNTER — TELEPHONE (OUTPATIENT)
Dept: DIABETES | Facility: CLINIC | Age: 51
End: 2021-12-22
Payer: MEDICAID

## 2022-01-24 NOTE — PROGRESS NOTES
PCP: NADIA Crook MD    Subjective:     Chief Complaint: Diabetes     TELEMEDICINE VISIT:     The patient location is: Home  The chief complaint leading to consultation is: Diabetes Follow up  Visit type: Virtual visit with synchronous audio and video  Total time spent with patient: 15  Each patient to whom he or she provides medical services by telemedicine is:  (1) informed of the relationship between the physician and patient and the respective role of any other health care provider with respect to management of the patient; and (2) notified that he or she may decline to receive medical services by telemedicine and may withdraw from such care at any time.    Notes: N/A      HISTORY OF PRESENT ILLNESS: 51 y.o.   male presenting for diabetes management.   The patient's last visit with me was on 9/9/2021.  Patient has had Type II diabetes since approximately 10 years ago. .  Pertinent to decision making is the following comorbidities: HTN, HLD, Fatty Liver, Obesity by BMI and SOPHIE   Patient has the following Diabetes complications: with diabetic polyneuropathy  He  has attended diabetes education in the past.     Patient's most recent A1c of 8.0% was completed 1 months ago.   Patient states since His last A1c His blood glucose levels have been high  throughout the day .   Patient monitors blood glucose 3 times per day with meter : Fasting, Before Lunch and Before Dinner.   Patient blood glucose monitoring device will not be uploaded into Media Section today secondary to patient forgetting blood glucose monitoring device.   Per patient, fasting blood sugars ranging from 140 - 150 and over 200 after eating.    Patient endorses the following diabetes related symptoms: None.  Patient is due today for the following diabetes-related health maintenance standards: COVID-19 Vaccine .   He denies recent hospital admissions or emergency room visits. Of note, patient recently had COVID 19.   He denies having  hypoglycemia.   Patient's concerns today include glycemic control.  Patient medication regimen is as below.     CURRENT DM MEDICATIONS:    Glimepiride 4 mg in the am and 4 mg before dinner   Metformin 1000 mg BID     Actos 15 mg daily   Farxiga 10 mg daily     Patient has failed the following Diabetes medications:    Trulicity /Victoza  - nausea    Toujeo    Humalog    Januvia   Jardiance - blurry vision       Labs Reviewed.       No results found for: CPEPTIDE  No results found for: GLUTAMICACID       //   , There is no height or weight on file to calculate BMI.  His blood sugar in clinic today is:      Review of Systems   Constitutional: Negative for activity change, appetite change, chills and fever.   HENT: Negative for dental problem, mouth sores, nosebleeds, sore throat and trouble swallowing.    Eyes: Negative for pain and discharge.   Respiratory: Negative for shortness of breath, wheezing and stridor.    Cardiovascular: Negative for chest pain, palpitations and leg swelling.   Gastrointestinal: Negative for abdominal pain, diarrhea, nausea and vomiting.   Endocrine: Negative for polydipsia, polyphagia and polyuria.   Genitourinary: Negative for dysuria, frequency and urgency.   Musculoskeletal: Negative for joint swelling and myalgias.   Skin: Negative for rash and wound.   Neurological: Negative for dizziness, syncope, weakness and headaches.   Psychiatric/Behavioral: Negative for behavioral problems and dysphoric mood.         Diabetes Management Status  Statin: Taking  ACE/ARB: Not taking    Screening or Prevention Patient's value Goal Complete/Controlled?   HgA1C Testing and Control   Lab Results   Component Value Date    HGBA1C 8.0 (H) 12/20/2021      Annually/Less than 8% Yes   Lipid profile : 03/05/2021 Annually No   LDL control Lab Results   Component Value Date    LDLCALC 65.0 03/05/2021    Annually/Less than 100 mg/dl  No   Nephropathy screening Lab Results   Component Value Date     MICALBCREAT 31.8 (H) 03/05/2021     No results found for: PROTEINUA Annually No   Blood pressure BP Readings from Last 1 Encounters:   11/05/21 (!) 160/80    Less than 140/90 No   Dilated retinal exam : 03/25/2021 Annually No    Foot exam   : 05/28/2021 Annually Yes     Social History     Socioeconomic History    Marital status:    Tobacco Use    Smoking status: Former Smoker   Substance and Sexual Activity    Alcohol use: Never    Drug use: Never    Sexual activity: Yes     Partners: Female     Past Medical History:   Diagnosis Date    Arthritis     Chronic bilateral low back pain without sciatica 7/27/2020    X-ray Lumbar Spine December, 2019: Dextroscoliosis of the lumbar spine is noted.  There is minimal grade 1 retrolisthesis of L2 on L3 and L3 on L4.  No fracture is seen.  Degenerative changes are noted with spurring of the endplates and lower lumbar facet arthropathy.  Mild disc space narrowing at L2-L3.    Class 2 severe obesity due to excess calories with serious comorbidity and body mass index (BMI) of 38.0 to 38.9 in adult 9/22/2020    Diabetes mellitus, type 2     Essential hypertension 1/21/2020    Hepatosplenomegaly 10/7/2020    US ABDOMEN LIMITED 09/30/2020 FINDINGS: Liver: Increased in size, measuring 21.3 cm. Homogeneous increased echotexture. No focal hepatic lesions. Gallbladder: No calculi, wall thickening, or pericholecystic fluid. No sonographic Hdez's sign. Biliary system: The common duct is not dilated, measuring 5 mm. No intrahepatic ductal dilatation. Spleen: Increased in size and echotexture, measuring 15.9    Hyperlipidemia     Hypertension     Lesion of spleen 10/7/2020    US ABDOMEN LIMITED 09/30/2020 Spleen: Increased in size and echotexture, measuring 15.9 cm. There is heterogeneous area in the spleen which measures 3 x 2.2 x 2.1 cm with more central area of isoechogenicity with peripheral hypoechoic region. IMPRESSION: ...Indeterminate 3 cm heterogeneous mass  in the spleen, potentially a hemangioma although other etiologies possible as well. Consider attention o    NAFLD (nonalcoholic fatty liver disease) 10/7/2020    US ABDOMEN LIMITED 09/30/2020 FINDINGS: Liver: Increased in size, measuring 21.3 cm. Homogeneous increased echotexture. No focal hepatic lesions. Gallbladder: No calculi, wall thickening, or pericholecystic fluid. No sonographic Hdez's sign. Biliary system: The common duct is not dilated, measuring 5 mm. No intrahepatic ductal dilatation. Spleen: Increased in size and echotexture, measuring 15.9    Obstructive sleep apnea syndrome 7/27/2020    Spondylosis of lumbar region without myelopathy or radiculopathy 7/27/2020    Type 2 diabetes mellitus with diabetic polyneuropathy, without long-term current use of insulin 7/27/2020    Type 2 diabetes mellitus with other specified complication 4/21/2020    Uncontrolled type 2 diabetes mellitus with hyperglycemia 4/21/2020       Objective:      Physical Exam  Constitutional:       General: He is not in acute distress.     Appearance: He is well-developed. He is not diaphoretic.   HENT:      Head: Normocephalic and atraumatic.      Right Ear: External ear normal.      Left Ear: External ear normal.      Nose: Nose normal.   Eyes:      General:         Right eye: No discharge.         Left eye: No discharge.   Pulmonary:      Effort: Pulmonary effort is normal. No respiratory distress.      Breath sounds: No stridor.   Musculoskeletal:         General: Normal range of motion.      Cervical back: Normal range of motion.   Skin:     Coloration: Skin is not pale.   Neurological:      Mental Status: He is alert and oriented to person, place, and time.      Motor: No abnormal muscle tone.      Coordination: Coordination normal.   Psychiatric:         Behavior: Behavior normal.         Thought Content: Thought content normal.         Judgment: Judgment normal.           Assessment / Plan:     Type 2 diabetes mellitus  "with hyperglycemia, without long-term current use of insulin  -     insulin lispro 100 unit/mL pen; Inject 5 Units into the skin 3 (three) times daily with meals.  Dispense: 6 mL; Refill: 11  -     pen needle, diabetic (BD ULTRA-FINE WILMAN PEN NEEDLE) 32 gauge x 5/32" Ndle; 1 each by Misc.(Non-Drug; Combo Route) route 4 (four) times daily with meals and nightly.  Dispense: 300 each; Refill: 3  -     lancets Misc; 1 each by Misc.(Non-Drug; Combo Route) route 4 (four) times daily.  Dispense: 300 each; Refill: 3  -     flash glucose sensor (FREESTYLE DAMASO 14 DAY SENSOR) Kit; 1 each by Misc.(Non-Drug; Combo Route) route every 14 (fourteen) days.  Dispense: 2 kit; Refill: 11    Diabetic polyneuropathy associated with type 2 diabetes mellitus    Essential hypertension    Dyslipidemia associated with type 2 diabetes mellitus    NAFLD (nonalcoholic fatty liver disease)    Obstructive sleep apnea syndrome    Class 2 severe obesity due to excess calories with serious comorbidity and body mass index (BMI) of 38.0 to 38.9 in adult      Additional Plan Details:    - POCT Glucose  - Encouraged continuation of lifestyle changes including regular exercise and limiting carbohydrates to 30-45 grams per meal threes times daily and 15 grams per snack with a limit of two daily.   - Encouraged continued monitoring of blood glucose with maintenance of 3 times daily at Fasting, Before Lunch and Before Dinner. [facility] to check pricing.   - Current DM Medication Regimen: Start Humalog 5 units TID wm. continue Farxiga 10 mg daily. STOP Glimepiride 4 mg before breakfast and before dinner. Continue Metformin 1000 mg BID. Continue Actos 15 mg daily.  - Health Maintenance standards addressed today: COVID - 19 Vaccine - patient will schedule outside of [facility] .   - Nursing Visit: Patient is below goal range for nursing visit for age group and will not need nursing visit at this time .   - Follow up with me in 3 weeks with A1c prior "       Blakeney McKnight, PA-C [facility]

## 2022-01-25 ENCOUNTER — OFFICE VISIT (OUTPATIENT)
Dept: DIABETES | Facility: CLINIC | Age: 52
End: 2022-01-25
Payer: MEDICAID

## 2022-01-25 DIAGNOSIS — I10 ESSENTIAL HYPERTENSION: ICD-10-CM

## 2022-01-25 DIAGNOSIS — E78.5 DYSLIPIDEMIA ASSOCIATED WITH TYPE 2 DIABETES MELLITUS: ICD-10-CM

## 2022-01-25 DIAGNOSIS — E11.65 TYPE 2 DIABETES MELLITUS WITH HYPERGLYCEMIA, WITHOUT LONG-TERM CURRENT USE OF INSULIN: ICD-10-CM

## 2022-01-25 DIAGNOSIS — E11.69 DYSLIPIDEMIA ASSOCIATED WITH TYPE 2 DIABETES MELLITUS: ICD-10-CM

## 2022-01-25 DIAGNOSIS — E11.65 TYPE 2 DIABETES MELLITUS WITH HYPERGLYCEMIA, WITHOUT LONG-TERM CURRENT USE OF INSULIN: Primary | ICD-10-CM

## 2022-01-25 DIAGNOSIS — E11.42 DIABETIC POLYNEUROPATHY ASSOCIATED WITH TYPE 2 DIABETES MELLITUS: ICD-10-CM

## 2022-01-25 DIAGNOSIS — G47.33 OBSTRUCTIVE SLEEP APNEA SYNDROME: ICD-10-CM

## 2022-01-25 DIAGNOSIS — K76.0 NAFLD (NONALCOHOLIC FATTY LIVER DISEASE): ICD-10-CM

## 2022-01-25 DIAGNOSIS — E66.01 CLASS 2 SEVERE OBESITY DUE TO EXCESS CALORIES WITH SERIOUS COMORBIDITY AND BODY MASS INDEX (BMI) OF 38.0 TO 38.9 IN ADULT: ICD-10-CM

## 2022-01-25 PROCEDURE — 3052F PR MOST RECENT HEMOGLOBIN A1C LEVEL 8.0 - < 9.0%: ICD-10-PCS | Mod: CPTII,95,, | Performed by: PHYSICIAN ASSISTANT

## 2022-01-25 PROCEDURE — 4010F ACE/ARB THERAPY RXD/TAKEN: CPT | Mod: CPTII,95,, | Performed by: PHYSICIAN ASSISTANT

## 2022-01-25 PROCEDURE — 99213 OFFICE O/P EST LOW 20 MIN: CPT | Mod: 95,,, | Performed by: PHYSICIAN ASSISTANT

## 2022-01-25 PROCEDURE — 4010F PR ACE/ARB THEARPY RXD/TAKEN: ICD-10-PCS | Mod: CPTII,95,, | Performed by: PHYSICIAN ASSISTANT

## 2022-01-25 PROCEDURE — 3052F HG A1C>EQUAL 8.0%<EQUAL 9.0%: CPT | Mod: CPTII,95,, | Performed by: PHYSICIAN ASSISTANT

## 2022-01-25 PROCEDURE — 99213 PR OFFICE/OUTPT VISIT, EST, LEVL III, 20-29 MIN: ICD-10-PCS | Mod: 95,,, | Performed by: PHYSICIAN ASSISTANT

## 2022-01-25 RX ORDER — PEN NEEDLE, DIABETIC 30 GX3/16"
1 NEEDLE, DISPOSABLE MISCELLANEOUS
Qty: 300 EACH | Refills: 3 | Status: SHIPPED | OUTPATIENT
Start: 2022-01-25 | End: 2022-01-25 | Stop reason: SDUPTHER

## 2022-01-25 RX ORDER — LANCETS
1 EACH MISCELLANEOUS 4 TIMES DAILY
Qty: 300 EACH | Refills: 3 | Status: SHIPPED | OUTPATIENT
Start: 2022-01-25

## 2022-01-25 RX ORDER — FLASH GLUCOSE SENSOR
1 KIT MISCELLANEOUS
Qty: 2 KIT | Refills: 11 | Status: SHIPPED | OUTPATIENT
Start: 2022-01-25 | End: 2022-02-04 | Stop reason: SDUPTHER

## 2022-01-25 RX ORDER — INSULIN LISPRO 100 [IU]/ML
5 INJECTION, SOLUTION INTRAVENOUS; SUBCUTANEOUS
Qty: 6 ML | Refills: 11 | Status: SHIPPED | OUTPATIENT
Start: 2022-01-25 | End: 2022-09-21 | Stop reason: SDUPTHER

## 2022-01-25 NOTE — TELEPHONE ENCOUNTER
----- Message from Maribel Davidson sent at 1/25/2022  9:30 AM CST -----  Contact: [facility]  Request a return call the pt pen needles script, no additional info given and can be reached at [phone]///[email or web address]

## 2022-01-25 NOTE — PATIENT INSTRUCTIONS
CURRENT DM MEDICATIONS:    STOP Glimepiride    Metformin 1000 mg twice a day     Actos 15 mg daily   Farxiga 10 mg daily    Humalog 5 units three times daily before meals - dose 15 mins before

## 2022-01-27 RX ORDER — PEN NEEDLE, DIABETIC 30 GX3/16"
1 NEEDLE, DISPOSABLE MISCELLANEOUS
Qty: 300 EACH | Refills: 3 | Status: SHIPPED | OUTPATIENT
Start: 2022-01-27 | End: 2023-01-27

## 2022-02-04 DIAGNOSIS — E11.65 TYPE 2 DIABETES MELLITUS WITH HYPERGLYCEMIA, WITHOUT LONG-TERM CURRENT USE OF INSULIN: ICD-10-CM

## 2022-02-04 RX ORDER — FLASH GLUCOSE SENSOR
1 KIT MISCELLANEOUS
Qty: 2 KIT | Refills: 11 | Status: SHIPPED | OUTPATIENT
Start: 2022-02-04 | End: 2022-02-11 | Stop reason: SDUPTHER

## 2022-02-11 DIAGNOSIS — E11.65 TYPE 2 DIABETES MELLITUS WITH HYPERGLYCEMIA, WITHOUT LONG-TERM CURRENT USE OF INSULIN: ICD-10-CM

## 2022-02-11 RX ORDER — FLASH GLUCOSE SENSOR
1 KIT MISCELLANEOUS
Qty: 2 KIT | Refills: 11 | Status: SHIPPED | OUTPATIENT
Start: 2022-02-11 | End: 2022-02-16 | Stop reason: SDUPTHER

## 2022-02-16 DIAGNOSIS — E11.65 TYPE 2 DIABETES MELLITUS WITH HYPERGLYCEMIA, WITHOUT LONG-TERM CURRENT USE OF INSULIN: ICD-10-CM

## 2022-02-16 RX ORDER — FLASH GLUCOSE SENSOR
1 KIT MISCELLANEOUS
Qty: 2 KIT | Refills: 11 | Status: SHIPPED | OUTPATIENT
Start: 2022-02-16 | End: 2023-01-30 | Stop reason: SDUPTHER

## 2022-03-19 ENCOUNTER — PATIENT OUTREACH (OUTPATIENT)
Dept: ADMINISTRATIVE | Facility: OTHER | Age: 52
End: 2022-03-19
Payer: MEDICAID

## 2022-03-19 NOTE — PROGRESS NOTES
Health Maintenance Due   Topic Date Due    Pneumococcal Vaccines (Age 0-64) (1 of 2 - PPSV23) Never done    HIV Screening  Never done    TETANUS VACCINE  Never done    Colorectal Cancer Screening  Never done    Shingles Vaccine (1 of 2) Never done    COVID-19 Vaccine (3 - Booster for Moderna series) 11/05/2021    Lipid Panel  03/05/2022    Diabetes Urine Screening  03/05/2022    Hemoglobin A1c  03/20/2022    Eye Exam  03/25/2022     Updates were requested from care everywhere.  Chart was reviewed for overdue Proactive [facility] Encounters (GAUTAM) topics (CRS, Breast Cancer Screening, Eye exam)  Health Maintenance has been updated.  LINKS immunization registry triggered.  Immunizations were reconciled.

## 2022-03-21 ENCOUNTER — OFFICE VISIT (OUTPATIENT)
Dept: PODIATRY | Facility: CLINIC | Age: 52
End: 2022-03-21
Payer: MEDICAID

## 2022-03-21 VITALS — HEIGHT: 74 IN | BODY MASS INDEX: 38.31 KG/M2 | WEIGHT: 298.5 LBS

## 2022-03-21 DIAGNOSIS — M72.2 PLANTAR FASCIITIS OF LEFT FOOT: ICD-10-CM

## 2022-03-21 DIAGNOSIS — M79.672 LEFT FOOT PAIN: Primary | ICD-10-CM

## 2022-03-21 DIAGNOSIS — E11.42 TYPE 2 DIABETES MELLITUS WITH DIABETIC POLYNEUROPATHY, WITHOUT LONG-TERM CURRENT USE OF INSULIN: ICD-10-CM

## 2022-03-21 DIAGNOSIS — E66.01 SEVERE OBESITY: ICD-10-CM

## 2022-03-21 PROCEDURE — 99999 PR PBB SHADOW E&M-EST. PATIENT-LVL III: CPT | Mod: PBBFAC,,, | Performed by: PODIATRIST

## 2022-03-21 PROCEDURE — 3008F PR BODY MASS INDEX (BMI) DOCUMENTED: ICD-10-PCS | Mod: CPTII,,, | Performed by: PODIATRIST

## 2022-03-21 PROCEDURE — 4010F PR ACE/ARB THEARPY RXD/TAKEN: ICD-10-PCS | Mod: CPTII,,, | Performed by: PODIATRIST

## 2022-03-21 PROCEDURE — 99214 OFFICE O/P EST MOD 30 MIN: CPT | Mod: S$PBB,,, | Performed by: PODIATRIST

## 2022-03-21 PROCEDURE — 4010F ACE/ARB THERAPY RXD/TAKEN: CPT | Mod: CPTII,,, | Performed by: PODIATRIST

## 2022-03-21 PROCEDURE — 1159F MED LIST DOCD IN RCRD: CPT | Mod: CPTII,,, | Performed by: PODIATRIST

## 2022-03-21 PROCEDURE — 1160F RVW MEDS BY RX/DR IN RCRD: CPT | Mod: CPTII,,, | Performed by: PODIATRIST

## 2022-03-21 PROCEDURE — 3052F HG A1C>EQUAL 8.0%<EQUAL 9.0%: CPT | Mod: CPTII,,, | Performed by: PODIATRIST

## 2022-03-21 PROCEDURE — 1160F PR REVIEW ALL MEDS BY PRESCRIBER/CLIN PHARMACIST DOCUMENTED: ICD-10-PCS | Mod: CPTII,,, | Performed by: PODIATRIST

## 2022-03-21 PROCEDURE — 99999 PR PBB SHADOW E&M-EST. PATIENT-LVL III: ICD-10-PCS | Mod: PBBFAC,,, | Performed by: PODIATRIST

## 2022-03-21 PROCEDURE — 3052F PR MOST RECENT HEMOGLOBIN A1C LEVEL 8.0 - < 9.0%: ICD-10-PCS | Mod: CPTII,,, | Performed by: PODIATRIST

## 2022-03-21 PROCEDURE — 3008F BODY MASS INDEX DOCD: CPT | Mod: CPTII,,, | Performed by: PODIATRIST

## 2022-03-21 PROCEDURE — 1159F PR MEDICATION LIST DOCUMENTED IN MEDICAL RECORD: ICD-10-PCS | Mod: CPTII,,, | Performed by: PODIATRIST

## 2022-03-21 PROCEDURE — 99214 PR OFFICE/OUTPT VISIT, EST, LEVL IV, 30-39 MIN: ICD-10-PCS | Mod: S$PBB,,, | Performed by: PODIATRIST

## 2022-03-21 PROCEDURE — 99213 OFFICE O/P EST LOW 20 MIN: CPT | Mod: PBBFAC | Performed by: PODIATRIST

## 2022-03-21 NOTE — PROGRESS NOTES
Subjective:       Patient ID: Jean Claude Ocasio is a 51 y.o. male.    Chief Complaint: Foot Pain (Left ft pain for about 3 wks)      HPI: Jean Claude Ocasio complains of moderate pains to the left foot. States pains are sharp and stabbing-like in nature. Pains are to the plantar foot, mostly with walking and standing. Rates the pains at approx. 7/10. States post-static dyskinesia. Denies any recent identifiable trauma. Does limp with gait. No NSAID medications thus far. Pains have been present for the past 3 weeks and the pains have worsened over the past couple of weeks. States walking and standing causes and/or exacerbates the symptoms.  Continues to have uncontrolled diabetes mellitus type 2 with elevated A1c.  Follows with diabetic management for his insulin. Patient's Primary Care Provider is NADIA Crook MD.     Review of patient's allergies indicates:   Allergen Reactions    Codeine Rash    Hydrocodone-acetaminophen Rash       Past Medical History:   Diagnosis Date    Arthritis     Chronic bilateral low back pain without sciatica 7/27/2020    X-ray Lumbar Spine December, 2019: Dextroscoliosis of the lumbar spine is noted.  There is minimal grade 1 retrolisthesis of L2 on L3 and L3 on L4.  No fracture is seen.  Degenerative changes are noted with spurring of the endplates and lower lumbar facet arthropathy.  Mild disc space narrowing at L2-L3.    Class 2 severe obesity due to excess calories with serious comorbidity and body mass index (BMI) of 38.0 to 38.9 in adult 9/22/2020    Diabetes mellitus, type 2     Essential hypertension 1/21/2020    Hepatosplenomegaly 10/7/2020    US ABDOMEN LIMITED 09/30/2020 FINDINGS: Liver: Increased in size, measuring 21.3 cm. Homogeneous increased echotexture. No focal hepatic lesions. Gallbladder: No calculi, wall thickening, or pericholecystic fluid. No sonographic Hdez's sign. Biliary system: The common duct is not dilated, measuring 5 mm. No intrahepatic ductal  "dilatation. Spleen: Increased in size and echotexture, measuring 15.9    Hyperlipidemia     Hypertension     Lesion of spleen 10/7/2020    US ABDOMEN LIMITED 09/30/2020 Spleen: Increased in size and echotexture, measuring 15.9 cm. There is heterogeneous area in the spleen which measures 3 x 2.2 x 2.1 cm with more central area of isoechogenicity with peripheral hypoechoic region. IMPRESSION: ...Indeterminate 3 cm heterogeneous mass in the spleen, potentially a hemangioma although other etiologies possible as well. Consider attention o    NAFLD (nonalcoholic fatty liver disease) 10/7/2020    US ABDOMEN LIMITED 09/30/2020 FINDINGS: Liver: Increased in size, measuring 21.3 cm. Homogeneous increased echotexture. No focal hepatic lesions. Gallbladder: No calculi, wall thickening, or pericholecystic fluid. No sonographic Hdez's sign. Biliary system: The common duct is not dilated, measuring 5 mm. No intrahepatic ductal dilatation. Spleen: Increased in size and echotexture, measuring 15.9    Obstructive sleep apnea syndrome 7/27/2020    Spondylosis of lumbar region without myelopathy or radiculopathy 7/27/2020    Type 2 diabetes mellitus with diabetic polyneuropathy, without long-term current use of insulin 7/27/2020    Type 2 diabetes mellitus with other specified complication 4/21/2020    Uncontrolled type 2 diabetes mellitus with hyperglycemia 4/21/2020       Family History   Problem Relation Age of Onset    Hypertension Father        Social History     Socioeconomic History    Marital status:    Tobacco Use    Smoking status: Former Smoker   Substance and Sexual Activity    Alcohol use: Never    Drug use: Never    Sexual activity: Yes     Partners: Female       History reviewed. No pertinent surgical history.    Review of Systems      Objective:   Ht 6' 2" (1.88 m)   Wt 135.4 kg (298 lb 8.1 oz)   BMI 38.33 kg/m²     US Abdomen Limited  Narrative: EXAMINATION:  US ABDOMEN LIMITED    CLINICAL " HISTORY:  Spleen lesion (probably hemangioma) seen on prior imaging; Other diseases of spleen    TECHNIQUE:  Limited ultrasound of the right upper quadrant of the abdomen (including pancreas, liver, gallbladder, common bile duct, and right kidney) was performed.    COMPARISON:  05/04/2021.    FINDINGS:  Liver: Enlarged, measuring 23.3 cm. Increased parenchymal echogenicity and mildly coarsened echotexture similar to prior.  No focal hepatic lesions.    Gallbladder: No calculi, wall thickening, or pericholecystic fluid.  No sonographic Hdez's sign.    Biliary system: The common duct is not dilated, measuring 5.0 mm.  No intrahepatic ductal dilatation.    Pancreas: Obscured by overlying bowel gas.    Spleen: Enlarged, measuring 15.9 cm.  No significant change in heterogeneous lesion measuring 2.8 cm, likely hemangioma.    Miscellaneous: No upper abdominal ascites.  Impression: 1. Hepatomegaly with parenchymal changes consistent with hepatic steatosis or other chronic liver disease.  2. Splenomegaly and stable splenic lesion, likely hemangioma.    Electronically signed by: JULIAN Cummings MD  Date:    09/21/2021  Time:    07:34      Physical Exam  LOWER EXTREMITY PHYSICAL EXAMINATION    VASCULAR: The right DP pulse is 2/4 and the left DP is 2/4. The right PT pulse is 2/4 and the left PT pulse is 2/4. Proximal to distal, warm to warm. No dependent rubor or elevation palor is noted. Capillary refill time is less than 3 seconds. Hair growth is appreciated to the dorsal foot and digits.    NEUROLOGY: Proprioception is intact, bilateral. Sensation to light touch is decreased. Negative Tinel's Sign and negative Valleix Sign. No neurological sensations with compression of the area of Brown's Nerve in the area of the Abductor Hallucis muscle belly.    ORTHOPEDIC:  Moderate tenderness to palpation of the area around the plantar medial calcaneal tubercle on the left foot. Pains are characterized as sharp and stabbing-like  with direct palpation of the area. There is also mild pain to palpation of the immediate plantar aspect of the heel, and no pains to the lateral band of the fascia. No edema is noted. No fullness is noted. No pains or defects are noted within the plantar fascia at the arch. No plantar fibromas are noted. No defects are noted within the ligament. Dorsiflexion of the MTPJs with simultaneous palpation of the fascia at the arch, does worsen and exacerbate the pains. No pains with medial to lateral compression of the heel. Equinus contracture is noted. Antalgic gait pattern is noted.     DERMATOLOGY: No ecchymosis is noted.  Skin is supple, dry and intact. Skin is supple.  No hyperkeratosis noted. No calluses.  No open wounds or ulcerations are noted.  No palpable plantar fibromas noted.    Assessment:     1. Left foot pain    2. Plantar fasciitis of left foot    3. Uncontrolled diabetes mellitus with complication, with long-term current use of insulin    4. Type 2 diabetes mellitus with diabetic polyneuropathy, without long-term current use of insulin    5. Severe obesity          Plan:     Left foot pain    Plantar fasciitis of left foot    Uncontrolled diabetes mellitus with complication, with long-term current use of insulin    Type 2 diabetes mellitus with diabetic polyneuropathy, without long-term current use of insulin    Severe obesity        Thorough discussion is had with the patient today concerning the diagnosis, its etiology, and the treatment algorithm at present.    I explained to the patient that etiology and all treatment options for heel pain including rest,  ice messages, stretching exercises, strappings/tappings, NSAID's, injections, new shoegear with orthotic inserts, and/or surgical treatment. I also discussed a possible injection of steroid to help calm down the inflammation sooner. I expressed the importance of wearing the orthotics in culmination of other treatment modalities. Patient agreed to  stretching exercises and inserts. I gave written and verbal instructions on heel cord stretching and this was demonstrated for the patient. Patient expressed understanding and had the patient teach back the instructions.  Patient instructed on adequate icing techniques which should be done for acute pain and inflammation.    Discussed the importance of stretching to the posterior muscle groups of the gastrocnemius and the soleus.  A stretching sheet was provided to the patient in conjunction with a Thera-Band.  I do recommend patient perform stretching exercises 4-6 times per day and holding the stretches for approximately 15-30 seconds apiece.  We discussed importance of stretching as relates to lengthening the posterior muscle group which can decrease drain on the posterior aspect of the heel as well as the plantar aspect of the heel.  This will also decrease pain associated with post static dyskinesia.  Teach back mechanism was performed with the patient demonstrating the stretching exercises.    A prescription was provided to the patient for orthotics.  We discussed the importance of wearing the orthotics to help elevate the arch which will allow for tension to be lessened to the plantar fascia and posterior heel cord.  Discussed the importance of the break-in period with the inserts by wearing them 2-3 hours for the 1st day and increasing their use an hour each day until able to tolerate a full work period.    Continue to encourage appropriate glycemic control.  Patient under the care of diabetic management for his diabetes mellitus.  Last A1c was 8.0.  Continues to follow    Patient is counseled and reminded concerning the importance of good nutrition and healthy eating habits, which may include blood sugar control, to prevent and/or help podiatric foot and ankle complications.    Patient follow-up in 4-6 weeks.        No future appointments.

## 2022-04-18 ENCOUNTER — LAB VISIT (OUTPATIENT)
Dept: LAB | Facility: HOSPITAL | Age: 52
End: 2022-04-18
Attending: PHYSICIAN ASSISTANT
Payer: MEDICAID

## 2022-04-18 DIAGNOSIS — E11.65 TYPE 2 DIABETES MELLITUS WITH HYPERGLYCEMIA, WITHOUT LONG-TERM CURRENT USE OF INSULIN: ICD-10-CM

## 2022-04-18 LAB
ESTIMATED AVG GLUCOSE: 203 MG/DL (ref 68–131)
HBA1C MFR BLD: 8.7 % (ref 4–5.6)

## 2022-04-18 PROCEDURE — 83036 HEMOGLOBIN GLYCOSYLATED A1C: CPT | Performed by: PHYSICIAN ASSISTANT

## 2022-04-18 PROCEDURE — 36415 COLL VENOUS BLD VENIPUNCTURE: CPT | Performed by: PHYSICIAN ASSISTANT

## 2022-04-26 ENCOUNTER — PATIENT MESSAGE (OUTPATIENT)
Dept: ADMINISTRATIVE | Facility: HOSPITAL | Age: 52
End: 2022-04-26
Payer: MEDICAID

## 2022-05-19 LAB
LEFT EYE DM RETINOPATHY: NEGATIVE
RIGHT EYE DM RETINOPATHY: NEGATIVE

## 2022-06-16 ENCOUNTER — PATIENT OUTREACH (OUTPATIENT)
Dept: ADMINISTRATIVE | Facility: HOSPITAL | Age: 52
End: 2022-06-16
Payer: MEDICAID

## 2022-06-16 LAB
LEFT EYE DM RETINOPATHY: NEGATIVE
RIGHT EYE DM RETINOPATHY: NEGATIVE

## 2022-07-01 ENCOUNTER — TELEPHONE (OUTPATIENT)
Dept: DIABETES | Facility: CLINIC | Age: 52
End: 2022-07-01
Payer: MEDICAID

## 2022-09-20 NOTE — PROGRESS NOTES
PCP: [facility]    Subjective:     Chief Complaint: Diabetes     TELEMEDICINE VISIT:     The patient location is: Home  The chief complaint leading to consultation is: Diabetes Follow up  Visit type: Virtual visit with synchronous audio and video  Total time spent with patient: 25  Each patient to whom he or she provides medical services by telemedicine is:  (1) informed of the relationship between the physician and patient and the respective role of any other health care provider with respect to management of the patient; and (2) notified that he or she may decline to receive medical services by telemedicine and may withdraw from such care at any time.    Notes: N/A      HISTORY OF PRESENT ILLNESS: 52 y.o.   male presenting for diabetes management.   The patient's last visit with me was on 1/25/2022.  Patient has had Type II diabetes since approximately 10 years ago. .  Pertinent to decision making is the following comorbidities: HTN, HLD, Fatty Liver, Obesity by BMI and SOPHIE   Patient has the following Diabetes complications: with diabetic polyneuropathy  He  has attended diabetes education in the past.     Patient's most recent A1c of 7.8% was [place] 1[place].   Patient states since His last A1c His blood glucose levels have been high  throughout the day .   Patient monitors blood glucose 3 times per day with meter : Fasting, Before Lunch and Before Dinner.   Patient blood glucose monitoring device will not be uploaded into [employer] [facility] today secondary to patient forgetting blood glucose monitoring device.   Per patient, fasting blood sugars ranging from 140 -250 and up to 260 after eating.    Patient endorses the following diabetes related symptoms:  None .  Patient is due today for the following diabetes-related health maintenance standards: Foot Exam , Lipid panel, Urine Microalbumin/creatinine ratio, A1c, Influenza Vaccine, and COVID-19 Vaccine .   He denies recent  hospital admissions or emergency room visits.   He denies having hypoglycemia.   Patient's concerns today include glycemic control.  Patient medication regimen is as below.     CURRENT DM MEDICATIONS:   Glimepiride 4 mg BID wm   Humalog 5 units TID wm - not taking   Metformin 1000 mg BID    Actos 15 mg daily  Farxiga 10 mg - taking BID outside of medical advice    Patient has failed the following Diabetes medications:   Trulicity /Victoza  - nausea   Toujeo   Humalog   Januvia  Jardiance - blurry vision       Labs Reviewed.       No results found for: CPEPTIDE  No results found for: GLUTAMICACID       //   , There is no height or weight on file to calculate BMI.  His blood sugar in clinic today is:      Review of Systems   Constitutional:  Negative for activity change, appetite change, chills and fever.   HENT:  Negative for dental problem, mouth sores, nosebleeds, sore throat and trouble swallowing.    Eyes:  Negative for pain and discharge.   Respiratory:  Negative for shortness of breath, wheezing and stridor.    Cardiovascular:  Negative for chest pain, palpitations and leg swelling.   Gastrointestinal:  Negative for abdominal pain, diarrhea, nausea and vomiting.   Endocrine: Negative for polydipsia, polyphagia and polyuria.   Genitourinary:  Negative for dysuria, frequency and urgency.   Musculoskeletal:  Negative for joint swelling and myalgias.   Skin:  Negative for rash and wound.   Neurological:  Negative for dizziness, syncope, weakness and headaches.   Psychiatric/Behavioral:  Negative for behavioral problems and dysphoric mood.        Diabetes Management Status  Statin: Taking  ACE/ARB: Not taking    Screening or Prevention Patient's value Goal Complete/Controlled?   HgA1C Testing and Control   Lab Results   Component Value Date    HGBA1C 8.7 (H) 04/18/2022      Annually/Less than 8% Yes   Lipid profile : 03/05/2021 Annually No   LDL control Lab Results   Component Value Date    LDLCALC 65.0 03/05/2021     Annually/Less than 100 mg/dl  No   Nephropathy screening Lab Results   Component Value Date    MICALBCREAT 31.8 (H) 03/05/2021     No results found for: PROTEINUA Annually No   Blood pressure BP Readings from Last 1 Encounters:   11/05/21 (!) 160/80    Less than 140/90 No   Dilated retinal exam : 06/16/2022 Annually No    Foot exam   : 05/28/2021 Annually Yes     Social History     Socioeconomic History    Marital status:    Tobacco Use    Smoking status: Former   Substance and Sexual Activity    Alcohol use: Never    Drug use: Never    Sexual activity: Yes     Partners: Female     Past Medical History:   Diagnosis Date    Arthritis     Chronic bilateral low back pain without sciatica 7/27/2020    X-ray Lumbar Spine December, 2019: Dextroscoliosis of the lumbar spine is noted.  There is minimal grade 1 retrolisthesis of L2 on L3 and L3 on L4.  No fracture is seen.  Degenerative changes are noted with spurring of the endplates and lower lumbar facet arthropathy.  Mild disc space narrowing at L2-L3.    Class 2 severe obesity due to excess calories with serious comorbidity and body mass index (BMI) of 38.0 to 38.9 in adult 9/22/2020    Diabetes mellitus, type 2     Essential hypertension 1/21/2020    Hepatosplenomegaly 10/7/2020    US ABDOMEN LIMITED 09/30/2020 FINDINGS: Liver: Increased in size, measuring 21.3 cm. Homogeneous increased echotexture. No focal hepatic lesions. Gallbladder: No calculi, wall thickening, or pericholecystic fluid. No sonographic Hdez's sign. Biliary system: The common duct is not dilated, measuring 5 mm. No intrahepatic ductal dilatation. Spleen: Increased in size and echotexture, measuring 15.9    Hyperlipidemia     Hypertension     Lesion of spleen 10/7/2020    US ABDOMEN LIMITED 09/30/2020 Spleen: Increased in size and echotexture, measuring 15.9 cm. There is heterogeneous area in the spleen which measures 3 x 2.2 x 2.1 cm with more central area of isoechogenicity with  peripheral hypoechoic region. IMPRESSION: ...Indeterminate 3 cm heterogeneous mass in the spleen, potentially a hemangioma although other etiologies possible as well. Consider attention o    NAFLD (nonalcoholic fatty liver disease) 10/7/2020    US ABDOMEN LIMITED 09/30/2020 FINDINGS: Liver: Increased in size, measuring 21.3 cm. Homogeneous increased echotexture. No focal hepatic lesions. Gallbladder: No calculi, wall thickening, or pericholecystic fluid. No sonographic Hdez's sign. Biliary system: The common duct is not dilated, measuring 5 mm. No intrahepatic ductal dilatation. Spleen: Increased in size and echotexture, measuring 15.9    Obstructive sleep apnea syndrome 7/27/2020    Spondylosis of lumbar region without myelopathy or radiculopathy 7/27/2020    Type 2 diabetes mellitus with diabetic polyneuropathy, without long-term current use of insulin 7/27/2020    Type 2 diabetes mellitus with other specified complication 4/21/2020    Uncontrolled type 2 diabetes mellitus with hyperglycemia 4/21/2020       Objective:      Physical Exam  Constitutional:       General: He is not in acute distress.     Appearance: He is well-developed. He is not diaphoretic.   HENT:      Head: Normocephalic and atraumatic.      Right Ear: External ear normal.      Left Ear: External ear normal.      Nose: Nose normal.   Eyes:      General:         Right eye: No discharge.         Left eye: No discharge.   Pulmonary:      Effort: Pulmonary effort is normal. No respiratory distress.      Breath sounds: No stridor.   Musculoskeletal:         General: Normal range of motion.      Cervical back: Normal range of motion.   Skin:     Coloration: Skin is not pale.   Neurological:      Mental Status: He is alert and oriented to person, place, and time.      Motor: No abnormal muscle tone.      Coordination: Coordination normal.   Psychiatric:         Behavior: Behavior normal.         Thought Content: Thought content normal.          "Judgment: Judgment normal.         Assessment / Plan:     Type 2 diabetes mellitus with hyperglycemia, without long-term current use of insulin  -     dapagliflozin (FARXIGA) 10 mg tablet; Take 1 tablet (10 mg total) by mouth once daily.  Dispense: 30 tablet; Refill: 11  -     insulin lispro 100 unit/mL pen; Inject 5 Units into the skin 3 (three) times daily with meals.  Dispense: 6 mL; Refill: 11  -     pen needle, diabetic (BD ULTRA-FINE WILMAN PEN NEEDLE) 32 gauge x 5/32" Ndle; 1 each by Misc.(Non-Drug; Combo Route) route 4 (four) times daily with meals and nightly.  Dispense: 300 each; Refill: 3  -     Lipid Panel; Future; Expected date: 09/21/2022  -     Comprehensive Metabolic Panel; Future; Expected date: 09/21/2022  -     CBC Auto Differential; Future; Expected date: 09/21/2022  -     Hemoglobin A1C; Standing  -     TSH; Future; Expected date: 09/21/2022  -     Microalbumin/Creatinine Ratio, Urine; Future; Expected date: 09/21/2022    Diabetic polyneuropathy associated with type 2 diabetes mellitus    Essential hypertension    Dyslipidemia associated with type 2 diabetes mellitus    NAFLD (nonalcoholic fatty liver disease)    Obstructive sleep apnea syndrome    Class 2 severe obesity due to excess calories with serious comorbidity and body mass index (BMI) of 38.0 to 38.9 in adult    Additional Plan Details:    - POCT Glucose  - Encouraged continuation of lifestyle changes including regular exercise and limiting carbohydrates to 30-45 grams per meal threes times daily and 15 grams per snack with a limit of two daily.   - Encouraged continued monitoring of blood glucose with maintenance of 3 times daily at Fasting, Before Lunch and Before Dinner.   - Current DM Medication Regimen: Start Humalog 5 units TID wm. continue Farxiga 10 mg daily. STOP Glimepiride 4 mg before breakfast and before dinner. Continue Metformin 1000 mg BID. Continue Actos 15 mg daily.  - Fasting labs to be scheduled  - Health Maintenance " standards addressed today: Foot Exam - deferred by patient today because Telemedicine or Telephone visit, Lipid panel to be scheduled today, Urine Microalbumin / Creatinine Ratio scheduled, A1c to be scheduled, Flu Shot - patient would like to complete outside of [facility], and COVID - 19 Vaccine - patient will schedule outside of [facility] .   - Nursing Visit: Patient is below goal range for nursing visit for age group and will not need nursing visit at this time .   - Follow up with me in 4 weeks with A1c prior       Wang Pringle PA-C  [facility]

## 2022-09-21 ENCOUNTER — TELEPHONE (OUTPATIENT)
Dept: DIABETES | Facility: CLINIC | Age: 52
End: 2022-09-21

## 2022-09-21 ENCOUNTER — OFFICE VISIT (OUTPATIENT)
Dept: DIABETES | Facility: CLINIC | Age: 52
End: 2022-09-21
Payer: MEDICAID

## 2022-09-21 DIAGNOSIS — E11.65 TYPE 2 DIABETES MELLITUS WITH HYPERGLYCEMIA, WITHOUT LONG-TERM CURRENT USE OF INSULIN: Primary | ICD-10-CM

## 2022-09-21 DIAGNOSIS — E78.5 DYSLIPIDEMIA ASSOCIATED WITH TYPE 2 DIABETES MELLITUS: ICD-10-CM

## 2022-09-21 DIAGNOSIS — E11.69 DYSLIPIDEMIA ASSOCIATED WITH TYPE 2 DIABETES MELLITUS: ICD-10-CM

## 2022-09-21 DIAGNOSIS — K76.0 NAFLD (NONALCOHOLIC FATTY LIVER DISEASE): ICD-10-CM

## 2022-09-21 DIAGNOSIS — E11.42 DIABETIC POLYNEUROPATHY ASSOCIATED WITH TYPE 2 DIABETES MELLITUS: ICD-10-CM

## 2022-09-21 DIAGNOSIS — E66.01 CLASS 2 SEVERE OBESITY DUE TO EXCESS CALORIES WITH SERIOUS COMORBIDITY AND BODY MASS INDEX (BMI) OF 38.0 TO 38.9 IN ADULT: ICD-10-CM

## 2022-09-21 DIAGNOSIS — G47.33 OBSTRUCTIVE SLEEP APNEA SYNDROME: ICD-10-CM

## 2022-09-21 DIAGNOSIS — I10 ESSENTIAL HYPERTENSION: ICD-10-CM

## 2022-09-21 PROCEDURE — 4010F ACE/ARB THERAPY RXD/TAKEN: CPT | Mod: CPTII,95,, | Performed by: PHYSICIAN ASSISTANT

## 2022-09-21 PROCEDURE — 3052F HG A1C>EQUAL 8.0%<EQUAL 9.0%: CPT | Mod: CPTII,95,, | Performed by: PHYSICIAN ASSISTANT

## 2022-09-21 PROCEDURE — 4010F PR ACE/ARB THEARPY RXD/TAKEN: ICD-10-PCS | Mod: CPTII,95,, | Performed by: PHYSICIAN ASSISTANT

## 2022-09-21 PROCEDURE — 3052F PR MOST RECENT HEMOGLOBIN A1C LEVEL 8.0 - < 9.0%: ICD-10-PCS | Mod: CPTII,95,, | Performed by: PHYSICIAN ASSISTANT

## 2022-09-21 PROCEDURE — 99214 OFFICE O/P EST MOD 30 MIN: CPT | Mod: 95,,, | Performed by: PHYSICIAN ASSISTANT

## 2022-09-21 PROCEDURE — 99214 PR OFFICE/OUTPT VISIT, EST, LEVL IV, 30-39 MIN: ICD-10-PCS | Mod: 95,,, | Performed by: PHYSICIAN ASSISTANT

## 2022-09-21 RX ORDER — DAPAGLIFLOZIN 10 MG/1
10 TABLET, FILM COATED ORAL DAILY
Qty: 30 TABLET | Refills: 11 | Status: SHIPPED | OUTPATIENT
Start: 2022-09-21 | End: 2023-01-30 | Stop reason: SDUPTHER

## 2022-09-21 RX ORDER — INSULIN LISPRO 100 [IU]/ML
5 INJECTION, SOLUTION INTRAVENOUS; SUBCUTANEOUS
Qty: 6 ML | Refills: 11 | Status: SHIPPED | OUTPATIENT
Start: 2022-09-21 | End: 2022-10-19

## 2022-09-21 RX ORDER — PEN NEEDLE, DIABETIC 30 GX3/16"
1 NEEDLE, DISPOSABLE MISCELLANEOUS
Qty: 300 EACH | Refills: 3 | Status: SHIPPED | OUTPATIENT
Start: 2022-09-21 | End: 2023-08-01 | Stop reason: SDUPTHER

## 2022-09-21 NOTE — PATIENT INSTRUCTIONS
CURRENT DM MEDICATIONS:   Glimepiride - stop once able to start   Humalog 5 units before meals -15 mins before eating  Metformin 1000 mg twice a dayt    Actos 15 mg daily  Farxiga 10 mg daily

## 2022-09-21 NOTE — Clinical Note
4 week call with fasting labs and urine 1 week before at [facility] any day at 8a 3 mo in person

## 2022-09-30 DIAGNOSIS — E11.65 TYPE 2 DIABETES MELLITUS WITH HYPERGLYCEMIA, WITHOUT LONG-TERM CURRENT USE OF INSULIN: ICD-10-CM

## 2022-09-30 RX ORDER — PIOGLITAZONEHYDROCHLORIDE 15 MG/1
15 TABLET ORAL DAILY
Qty: 30 TABLET | Refills: 11 | Status: SHIPPED | OUTPATIENT
Start: 2022-09-30 | End: 2023-02-27

## 2022-09-30 NOTE — TELEPHONE ENCOUNTER
Called pt about Rx and sent request to provider ----- Message from Chanelle Ivey sent at 9/29/2022 11:32 AM CDT -----  Contact: Allen, [phone]  Calling to speak with the nurse regarding his medication. Clarence call him. Thanks.

## 2022-10-12 ENCOUNTER — LAB VISIT (OUTPATIENT)
Dept: LAB | Facility: HOSPITAL | Age: 52
End: 2022-10-12
Payer: MEDICAID

## 2022-10-12 DIAGNOSIS — E11.65 TYPE 2 DIABETES MELLITUS WITH HYPERGLYCEMIA, WITHOUT LONG-TERM CURRENT USE OF INSULIN: ICD-10-CM

## 2022-10-12 LAB
ALBUMIN/CREAT UR: 25 UG/MG (ref 0–30)
CREAT UR-MCNC: 128 MG/DL (ref 23–375)
MICROALBUMIN UR DL<=1MG/L-MCNC: 32 UG/ML

## 2022-10-12 PROCEDURE — 82570 ASSAY OF URINE CREATININE: CPT | Performed by: PHYSICIAN ASSISTANT

## 2022-10-12 PROCEDURE — 82043 UR ALBUMIN QUANTITATIVE: CPT | Performed by: PHYSICIAN ASSISTANT

## 2022-10-19 ENCOUNTER — OFFICE VISIT (OUTPATIENT)
Dept: DIABETES | Facility: CLINIC | Age: 52
End: 2022-10-19
Payer: MEDICAID

## 2022-10-19 DIAGNOSIS — I10 ESSENTIAL HYPERTENSION: ICD-10-CM

## 2022-10-19 DIAGNOSIS — E11.42 DIABETIC POLYNEUROPATHY ASSOCIATED WITH TYPE 2 DIABETES MELLITUS: ICD-10-CM

## 2022-10-19 DIAGNOSIS — G47.33 OBSTRUCTIVE SLEEP APNEA SYNDROME: ICD-10-CM

## 2022-10-19 DIAGNOSIS — E66.01 CLASS 2 SEVERE OBESITY DUE TO EXCESS CALORIES WITH SERIOUS COMORBIDITY AND BODY MASS INDEX (BMI) OF 38.0 TO 38.9 IN ADULT: ICD-10-CM

## 2022-10-19 DIAGNOSIS — E78.5 DYSLIPIDEMIA ASSOCIATED WITH TYPE 2 DIABETES MELLITUS: ICD-10-CM

## 2022-10-19 DIAGNOSIS — K76.0 NAFLD (NONALCOHOLIC FATTY LIVER DISEASE): ICD-10-CM

## 2022-10-19 DIAGNOSIS — E11.69 DYSLIPIDEMIA ASSOCIATED WITH TYPE 2 DIABETES MELLITUS: ICD-10-CM

## 2022-10-19 DIAGNOSIS — E11.65 TYPE 2 DIABETES MELLITUS WITH HYPERGLYCEMIA, WITHOUT LONG-TERM CURRENT USE OF INSULIN: Primary | ICD-10-CM

## 2022-10-19 PROCEDURE — 3066F PR DOCUMENTATION OF TREATMENT FOR NEPHROPATHY: ICD-10-PCS | Mod: CPTII,95,, | Performed by: PHYSICIAN ASSISTANT

## 2022-10-19 PROCEDURE — 4010F PR ACE/ARB THEARPY RXD/TAKEN: ICD-10-PCS | Mod: CPTII,95,, | Performed by: PHYSICIAN ASSISTANT

## 2022-10-19 PROCEDURE — 99213 PR OFFICE/OUTPT VISIT, EST, LEVL III, 20-29 MIN: ICD-10-PCS | Mod: 95,,, | Performed by: PHYSICIAN ASSISTANT

## 2022-10-19 PROCEDURE — 3052F PR MOST RECENT HEMOGLOBIN A1C LEVEL 8.0 - < 9.0%: ICD-10-PCS | Mod: CPTII,95,, | Performed by: PHYSICIAN ASSISTANT

## 2022-10-19 PROCEDURE — 3066F NEPHROPATHY DOC TX: CPT | Mod: CPTII,95,, | Performed by: PHYSICIAN ASSISTANT

## 2022-10-19 PROCEDURE — 99213 OFFICE O/P EST LOW 20 MIN: CPT | Mod: 95,,, | Performed by: PHYSICIAN ASSISTANT

## 2022-10-19 PROCEDURE — 4010F ACE/ARB THERAPY RXD/TAKEN: CPT | Mod: CPTII,95,, | Performed by: PHYSICIAN ASSISTANT

## 2022-10-19 PROCEDURE — 3061F NEG MICROALBUMINURIA REV: CPT | Mod: CPTII,95,, | Performed by: PHYSICIAN ASSISTANT

## 2022-10-19 PROCEDURE — 3052F HG A1C>EQUAL 8.0%<EQUAL 9.0%: CPT | Mod: CPTII,95,, | Performed by: PHYSICIAN ASSISTANT

## 2022-10-19 PROCEDURE — 3061F PR NEG MICROALBUMINURIA RESULT DOCUMENTED/REVIEW: ICD-10-PCS | Mod: CPTII,95,, | Performed by: PHYSICIAN ASSISTANT

## 2022-10-19 RX ORDER — INSULIN LISPRO 100 [IU]/ML
10 INJECTION, SOLUTION INTRAVENOUS; SUBCUTANEOUS
Qty: 9 ML | Refills: 11 | Status: SHIPPED | OUTPATIENT
Start: 2022-10-19 | End: 2022-11-29 | Stop reason: SDUPTHER

## 2022-10-19 NOTE — PROGRESS NOTES
PCP: Primary Doctor No    Subjective:     Chief Complaint: Diabetes     Established Patient - Audio Only Telehealth Visit     The patient location is: Home  The chief complaint leading to consultation is: Diabetes follow up  Visit type: Virtual visit with audio only (telephone)  Total Time Spent with Patient: 7 minutes     The reason for the audio only service rather than synchronous audio and video virtual visit was related to technical difficulties or patient preference/necessity.     Each patient to whom I provide medical services by telemedicine is:  (1) informed of the relationship between the physician and patient and the respective role of any other health care provider with respect to management of the patient; and (2) notified that they may decline to receive medical services by telemedicine and may withdraw from such care at any time. Patient verbally consented to receive this service via voice-only telephone call.    This service was not originating from a related E/M service provided within the previous 7 days nor will  to an E/M service or procedure within the next 24 hours or my soonest available appointment.  Prevailing standard of care was able to be met in this audio-only visit.      HISTORY OF PRESENT ILLNESS: 52 y.o.   male presenting for diabetes management.   The patient's last visit with me was on 9/21/2022.  Patient has had Type II diabetes since approximately 10 years ago. .  Pertinent to decision making is the following comorbidities: HTN, HLD, Fatty Liver, Obesity by BMI and SOPHIE   Patient has the following Diabetes complications: with diabetic polyneuropathy  He  has attended diabetes education in the past.     Patient's most recent A1c of 9.0% was completed 1 weeks ago.   Patient states since His last A1c His blood glucose levels have been high  throughout the day .   Patient monitors blood glucose 3 times per day with meter : Fasting, Before Lunch and Before Dinner.    Patient blood glucose monitoring device will not be uploaded into Media Section today secondary to patient forgetting blood glucose monitoring device.   Per patient, fasting blood sugars ranging from 140 -150 and up to 225 after eating.    Patient endorses the following diabetes related symptoms:  None .  Patient is due today for the following diabetes-related health maintenance standards: Foot Exam , Influenza Vaccine, COVID-19 Vaccine , and Colorectal Cancer screening .   He denies recent hospital admissions or emergency room visits.   He denies having hypoglycemia.   Patient's concerns today include glycemic control.  Patient medication regimen is as below.     CURRENT DM MEDICATIONS:   Glimepiride 4 mg BID wm   Humalog 5 units TID wm - took x 2 weeks then restarted Glimepiride and stopped Humalog due to hyperglycemia  Metformin 1000 mg BID    Actos 15 mg daily  Farxiga 10 mg daily    Patient has failed the following Diabetes medications:   Trulicity /Victoza  - nausea   Toujeo   Humalog   Januvia  Jardiance - blurry vision       Labs Reviewed.       No results found for: CPEPTIDE  No results found for: GLUTAMICACID       //   , There is no height or weight on file to calculate BMI.  His blood sugar in clinic today is:      Review of Systems   Constitutional:  Negative for activity change, appetite change, chills and fever.   HENT:  Negative for dental problem, mouth sores, nosebleeds, sore throat and trouble swallowing.    Eyes:  Negative for pain and discharge.   Respiratory:  Negative for shortness of breath, wheezing and stridor.    Cardiovascular:  Negative for chest pain, palpitations and leg swelling.   Gastrointestinal:  Negative for abdominal pain, diarrhea, nausea and vomiting.   Endocrine: Negative for polydipsia, polyphagia and polyuria.   Genitourinary:  Negative for dysuria, frequency and urgency.   Musculoskeletal:  Negative for joint swelling and myalgias.   Skin:  Negative for rash and wound.    Neurological:  Negative for dizziness, syncope, weakness and headaches.   Psychiatric/Behavioral:  Negative for behavioral problems and dysphoric mood.        Diabetes Management Status  Statin: Taking  ACE/ARB: Not taking    Screening or Prevention Patient's value Goal Complete/Controlled?   HgA1C Testing and Control   Lab Results   Component Value Date    HGBA1C 9.0 (H) 10/12/2022      Annually/Less than 8% Yes   Lipid profile : 10/12/2022 Annually No   LDL control Lab Results   Component Value Date    LDLCALC 76.0 10/12/2022    Annually/Less than 100 mg/dl  No   Nephropathy screening Lab Results   Component Value Date    MICALBCREAT 25.0 10/12/2022     No results found for: PROTEINUA Annually No   Blood pressure BP Readings from Last 1 Encounters:   11/05/21 (!) 160/80    Less than 140/90 No   Dilated retinal exam : 06/16/2022 Annually No    Foot exam   : 05/28/2021 Annually Yes     Social History     Socioeconomic History    Marital status:    Tobacco Use    Smoking status: Former   Substance and Sexual Activity    Alcohol use: Never    Drug use: Never    Sexual activity: Yes     Partners: Female     Past Medical History:   Diagnosis Date    Arthritis     Chronic bilateral low back pain without sciatica 7/27/2020    X-ray Lumbar Spine December, 2019: Dextroscoliosis of the lumbar spine is noted.  There is minimal grade 1 retrolisthesis of L2 on L3 and L3 on L4.  No fracture is seen.  Degenerative changes are noted with spurring of the endplates and lower lumbar facet arthropathy.  Mild disc space narrowing at L2-L3.    Class 2 severe obesity due to excess calories with serious comorbidity and body mass index (BMI) of 38.0 to 38.9 in adult 9/22/2020    Diabetes mellitus, type 2     Essential hypertension 1/21/2020    Hepatosplenomegaly 10/7/2020    US ABDOMEN LIMITED 09/30/2020 FINDINGS: Liver: Increased in size, measuring 21.3 cm. Homogeneous increased echotexture. No focal hepatic lesions. Gallbladder:  No calculi, wall thickening, or pericholecystic fluid. No sonographic Hdez's sign. Biliary system: The common duct is not dilated, measuring 5 mm. No intrahepatic ductal dilatation. Spleen: Increased in size and echotexture, measuring 15.9    Hyperlipidemia     Hypertension     Lesion of spleen 10/7/2020    US ABDOMEN LIMITED 09/30/2020 Spleen: Increased in size and echotexture, measuring 15.9 cm. There is heterogeneous area in the spleen which measures 3 x 2.2 x 2.1 cm with more central area of isoechogenicity with peripheral hypoechoic region. IMPRESSION: ...Indeterminate 3 cm heterogeneous mass in the spleen, potentially a hemangioma although other etiologies possible as well. Consider attention o    NAFLD (nonalcoholic fatty liver disease) 10/7/2020    US ABDOMEN LIMITED 09/30/2020 FINDINGS: Liver: Increased in size, measuring 21.3 cm. Homogeneous increased echotexture. No focal hepatic lesions. Gallbladder: No calculi, wall thickening, or pericholecystic fluid. No sonographic Hdez's sign. Biliary system: The common duct is not dilated, measuring 5 mm. No intrahepatic ductal dilatation. Spleen: Increased in size and echotexture, measuring 15.9    Obstructive sleep apnea syndrome 7/27/2020    Spondylosis of lumbar region without myelopathy or radiculopathy 7/27/2020    Type 2 diabetes mellitus with diabetic polyneuropathy, without long-term current use of insulin 7/27/2020    Type 2 diabetes mellitus with other specified complication 4/21/2020    Uncontrolled type 2 diabetes mellitus with hyperglycemia 4/21/2020       Objective:      Physical Exam  Neurological:      Mental Status: He is alert and oriented to person, place, and time. Mental status is at baseline.   Psychiatric:         Mood and Affect: Mood normal.         Behavior: Behavior normal.         Thought Content: Thought content normal.         Judgment: Judgment normal.         Assessment / Plan:     Type 2 diabetes mellitus with hyperglycemia,  without long-term current use of insulin    Diabetic polyneuropathy associated with type 2 diabetes mellitus    Essential hypertension    Dyslipidemia associated with type 2 diabetes mellitus    NAFLD (nonalcoholic fatty liver disease)    Obstructive sleep apnea syndrome    Class 2 severe obesity due to excess calories with serious comorbidity and body mass index (BMI) of 38.0 to 38.9 in adult    Additional Plan Details:    - POCT Glucose  - Encouraged continuation of lifestyle changes including regular exercise and limiting carbohydrates to 30-45 grams per meal threes times daily and 15 grams per snack with a limit of two daily.   - Encouraged continued monitoring of blood glucose with maintenance of 3 times daily at Fasting, Before Lunch and Before Dinner.   - Current DM Medication Regimen: Change Humalog 10 units TID wm. continue Farxiga 10 mg daily. STOP Glimepiride 4 mg before breakfast and before dinner. Continue Metformin 1000 mg BID. Continue Actos 15 mg daily.  - Health Maintenance standards addressed today: Foot Exam - deferred by patient today because Telemedicine or Telephone visit, Flu Shot - patient would like to complete outside of [facility], COVID - 19 Vaccine - patient will schedule outside of [facility] , and Colorectal Cancer screening - discussed; will schedule .   - Nursing Visit: Patient is below goal range for nursing visit for age group and will not need nursing visit at this time .   - Follow up with me in 4 weeks with A1c prior       Blakeney McKnight, PA-C [facility]

## 2022-10-25 ENCOUNTER — OFFICE VISIT (OUTPATIENT)
Dept: DIABETES | Facility: CLINIC | Age: 52
End: 2022-10-25
Payer: MEDICAID

## 2022-10-25 DIAGNOSIS — E11.69 DYSLIPIDEMIA ASSOCIATED WITH TYPE 2 DIABETES MELLITUS: ICD-10-CM

## 2022-10-25 DIAGNOSIS — G47.33 OBSTRUCTIVE SLEEP APNEA SYNDROME: ICD-10-CM

## 2022-10-25 DIAGNOSIS — K76.0 NAFLD (NONALCOHOLIC FATTY LIVER DISEASE): ICD-10-CM

## 2022-10-25 DIAGNOSIS — I10 ESSENTIAL HYPERTENSION: ICD-10-CM

## 2022-10-25 DIAGNOSIS — E66.01 CLASS 2 SEVERE OBESITY DUE TO EXCESS CALORIES WITH SERIOUS COMORBIDITY AND BODY MASS INDEX (BMI) OF 38.0 TO 38.9 IN ADULT: ICD-10-CM

## 2022-10-25 DIAGNOSIS — E78.5 DYSLIPIDEMIA ASSOCIATED WITH TYPE 2 DIABETES MELLITUS: ICD-10-CM

## 2022-10-25 DIAGNOSIS — E11.42 DIABETIC POLYNEUROPATHY ASSOCIATED WITH TYPE 2 DIABETES MELLITUS: ICD-10-CM

## 2022-10-25 DIAGNOSIS — E11.65 TYPE 2 DIABETES MELLITUS WITH HYPERGLYCEMIA, WITHOUT LONG-TERM CURRENT USE OF INSULIN: Primary | ICD-10-CM

## 2022-10-25 PROCEDURE — 4010F PR ACE/ARB THEARPY RXD/TAKEN: ICD-10-PCS | Mod: CPTII,95,, | Performed by: PHYSICIAN ASSISTANT

## 2022-10-25 PROCEDURE — 4010F ACE/ARB THERAPY RXD/TAKEN: CPT | Mod: CPTII,95,, | Performed by: PHYSICIAN ASSISTANT

## 2022-10-25 PROCEDURE — 99213 PR OFFICE/OUTPT VISIT, EST, LEVL III, 20-29 MIN: ICD-10-PCS | Mod: 95,,, | Performed by: PHYSICIAN ASSISTANT

## 2022-10-25 PROCEDURE — 3066F PR DOCUMENTATION OF TREATMENT FOR NEPHROPATHY: ICD-10-PCS | Mod: CPTII,95,, | Performed by: PHYSICIAN ASSISTANT

## 2022-10-25 PROCEDURE — 3061F PR NEG MICROALBUMINURIA RESULT DOCUMENTED/REVIEW: ICD-10-PCS | Mod: CPTII,95,, | Performed by: PHYSICIAN ASSISTANT

## 2022-10-25 PROCEDURE — 3052F PR MOST RECENT HEMOGLOBIN A1C LEVEL 8.0 - < 9.0%: ICD-10-PCS | Mod: CPTII,95,, | Performed by: PHYSICIAN ASSISTANT

## 2022-10-25 PROCEDURE — 99213 OFFICE O/P EST LOW 20 MIN: CPT | Mod: 95,,, | Performed by: PHYSICIAN ASSISTANT

## 2022-10-25 PROCEDURE — 3052F HG A1C>EQUAL 8.0%<EQUAL 9.0%: CPT | Mod: CPTII,95,, | Performed by: PHYSICIAN ASSISTANT

## 2022-10-25 PROCEDURE — 3061F NEG MICROALBUMINURIA REV: CPT | Mod: CPTII,95,, | Performed by: PHYSICIAN ASSISTANT

## 2022-10-25 PROCEDURE — 3066F NEPHROPATHY DOC TX: CPT | Mod: CPTII,95,, | Performed by: PHYSICIAN ASSISTANT

## 2022-10-25 NOTE — PROGRESS NOTES
PCP: Primary Doctor No    Subjective:     Chief Complaint: Diabetes     Established Patient - Audio Only Telehealth Visit     The patient location is: Home  The chief complaint leading to consultation is: Diabetes follow up  Visit type: Virtual visit with audio only (telephone)  Total Time Spent with Patient: 12 minutes     The reason for the audio only service rather than synchronous audio and video virtual visit was related to technical difficulties or patient preference/necessity.     Each patient to whom I provide medical services by telemedicine is:  (1) informed of the relationship between the physician and patient and the respective role of any other health care provider with respect to management of the patient; and (2) notified that they may decline to receive medical services by telemedicine and may withdraw from such care at any time. Patient verbally consented to receive this service via voice-only telephone call.    This service was not originating from a related E/M service provided within the previous 7 days nor will  to an E/M service or procedure within the next 24 hours or my soonest available appointment.  Prevailing standard of care was able to be met in this audio-only visit.      HISTORY OF PRESENT ILLNESS: 52 y.o.   male presenting for diabetes management.   The patient's last visit with me was on 10/19/2022.  Patient has had Type II diabetes since approximately 10 years ago. .  Pertinent to decision making is the following comorbidities: HTN, HLD, Fatty Liver, Obesity by BMI and SOPHIE   Patient has the following Diabetes complications: with diabetic polyneuropathy  He  has attended diabetes education in the past.     Patient's most recent A1c of 9.0% was completed 2 weeks ago.   Patient states since His last A1c His blood glucose levels have been high  throughout the day .   Patient monitors blood glucose 3 times per day with meter : Fasting, Before Lunch and Before Dinner.    Patient blood glucose monitoring device will not be uploaded into Media Section today secondary to patient forgetting blood glucose monitoring device.   Per patient, fasting blood sugars ranging from 145 - 150 and before dinner 160 and after dinner 200 - 250.   Patient endorses the following diabetes related symptoms:  None .  Patient is due today for the following diabetes-related health maintenance standards: Foot Exam , Influenza Vaccine, COVID-19 Vaccine , and Colorectal Cancer screening .   He denies recent hospital admissions or emergency room visits.   He denies having hypoglycemia.   Patient's concerns today include glycemic control.  Patient medication regimen is as below.     CURRENT DM MEDICATIONS:   Glimepiride - holding   Humalog 10 units TID wm  Metformin 1000 mg BID    Actos 15 mg daily  Farxiga 10 mg daily    Patient has failed the following Diabetes medications:   Trulicity /Victoza  - nausea   Toujeo   Humalog   Januvia  Jardiance - blurry vision       Labs Reviewed.       No results found for: CPEPTIDE  No results found for: GLUTAMICACID       //   , There is no height or weight on file to calculate BMI.  His blood sugar in clinic today is:      Review of Systems   Constitutional:  Negative for activity change, appetite change, chills and fever.   HENT:  Negative for dental problem, mouth sores, nosebleeds, sore throat and trouble swallowing.    Eyes:  Negative for pain and discharge.   Respiratory:  Negative for shortness of breath, wheezing and stridor.    Cardiovascular:  Negative for chest pain, palpitations and leg swelling.   Gastrointestinal:  Negative for abdominal pain, diarrhea, nausea and vomiting.   Endocrine: Negative for polydipsia, polyphagia and polyuria.   Genitourinary:  Negative for dysuria, frequency and urgency.   Musculoskeletal:  Negative for joint swelling and myalgias.   Skin:  Negative for rash and wound.   Neurological:  Negative for dizziness, syncope, weakness and  headaches.   Psychiatric/Behavioral:  Negative for behavioral problems and dysphoric mood.        Diabetes Management Status  Statin: Taking  ACE/ARB: Not taking    Screening or Prevention Patient's value Goal Complete/Controlled?   HgA1C Testing and Control   Lab Results   Component Value Date    HGBA1C 9.0 (H) 10/12/2022      Annually/Less than 8% Yes   Lipid profile : 10/12/2022 Annually No   LDL control Lab Results   Component Value Date    LDLCALC 76.0 10/12/2022    Annually/Less than 100 mg/dl  No   Nephropathy screening Lab Results   Component Value Date    MICALBCREAT 25.0 10/12/2022     No results found for: PROTEINUA Annually No   Blood pressure BP Readings from Last 1 Encounters:   11/05/21 (!) 160/80    Less than 140/90 No   Dilated retinal exam : 06/16/2022 Annually No    Foot exam   : 05/28/2021 Annually Yes     Social History     Socioeconomic History    Marital status:    Tobacco Use    Smoking status: Former   Substance and Sexual Activity    Alcohol use: Never    Drug use: Never    Sexual activity: Yes     Partners: Female     Past Medical History:   Diagnosis Date    Arthritis     Chronic bilateral low back pain without sciatica 7/27/2020    X-ray Lumbar Spine December, 2019: Dextroscoliosis of the lumbar spine is noted.  There is minimal grade 1 retrolisthesis of L2 on L3 and L3 on L4.  No fracture is seen.  Degenerative changes are noted with spurring of the endplates and lower lumbar facet arthropathy.  Mild disc space narrowing at L2-L3.    Class 2 severe obesity due to excess calories with serious comorbidity and body mass index (BMI) of 38.0 to 38.9 in adult 9/22/2020    Diabetes mellitus, type 2     Essential hypertension 1/21/2020    Hepatosplenomegaly 10/7/2020    US ABDOMEN LIMITED 09/30/2020 FINDINGS: Liver: Increased in size, measuring 21.3 cm. Homogeneous increased echotexture. No focal hepatic lesions. Gallbladder: No calculi, wall thickening, or pericholecystic fluid. No  sonographic Hdez's sign. Biliary system: The common duct is not dilated, measuring 5 mm. No intrahepatic ductal dilatation. Spleen: Increased in size and echotexture, measuring 15.9    Hyperlipidemia     Hypertension     Lesion of spleen 10/7/2020    US ABDOMEN LIMITED 09/30/2020 Spleen: Increased in size and echotexture, measuring 15.9 cm. There is heterogeneous area in the spleen which measures 3 x 2.2 x 2.1 cm with more central area of isoechogenicity with peripheral hypoechoic region. IMPRESSION: ...Indeterminate 3 cm heterogeneous mass in the spleen, potentially a hemangioma although other etiologies possible as well. Consider attention o    NAFLD (nonalcoholic fatty liver disease) 10/7/2020    US ABDOMEN LIMITED 09/30/2020 FINDINGS: Liver: Increased in size, measuring 21.3 cm. Homogeneous increased echotexture. No focal hepatic lesions. Gallbladder: No calculi, wall thickening, or pericholecystic fluid. No sonographic Hdez's sign. Biliary system: The common duct is not dilated, measuring 5 mm. No intrahepatic ductal dilatation. Spleen: Increased in size and echotexture, measuring 15.9    Obstructive sleep apnea syndrome 7/27/2020    Spondylosis of lumbar region without myelopathy or radiculopathy 7/27/2020    Type 2 diabetes mellitus with diabetic polyneuropathy, without long-term current use of insulin 7/27/2020    Type 2 diabetes mellitus with other specified complication 4/21/2020    Uncontrolled type 2 diabetes mellitus with hyperglycemia 4/21/2020       Objective:      Physical Exam  Neurological:      Mental Status: He is alert and oriented to person, place, and time. Mental status is at baseline.   Psychiatric:         Mood and Affect: Mood normal.         Behavior: Behavior normal.         Thought Content: Thought content normal.         Judgment: Judgment normal.         Assessment / Plan:     Type 2 diabetes mellitus with hyperglycemia, without long-term current use of insulin    Diabetic  polyneuropathy associated with type 2 diabetes mellitus    Essential hypertension    Dyslipidemia associated with type 2 diabetes mellitus    NAFLD (nonalcoholic fatty liver disease)    Obstructive sleep apnea syndrome    Class 2 severe obesity due to excess calories with serious comorbidity and body mass index (BMI) of 38.0 to 38.9 in adult    Additional Plan Details:    - POCT Glucose  - Encouraged continuation of lifestyle changes including regular exercise and limiting carbohydrates to 30-45 grams per meal threes times daily and 15 grams per snack with a limit of two daily.   - Encouraged continued monitoring of blood glucose with maintenance of 3 times daily at Fasting, Before Lunch and Before Dinner.   - Current DM Medication Regimen: Change Humalog 16 units TID wm. continue Farxiga 10 mg daily. Continue Metformin 1000 mg BID. Continue Actos 15 mg daily. If no improvement, will place pro CGM vs CGM sample for review.   - Health Maintenance standards addressed today: Foot Exam - deferred by patient today because Telemedicine or Telephone visit, Flu shot - patient declined, COVID - 19 Vaccine - Declined by patient, and Colorectal Cancer screening - discussed; would like to discuss Fit Kit with PCP .   - Nursing Visit: Patient is below goal range for nursing visit for age group and will not need nursing visit at this time .   - Follow up with me in 2 weeks with A1c prior       Blakeney McKnight, PA-C [facility]

## 2022-11-15 ENCOUNTER — OFFICE VISIT (OUTPATIENT)
Dept: DIABETES | Facility: CLINIC | Age: 52
End: 2022-11-15
Payer: MEDICAID

## 2022-11-15 DIAGNOSIS — Z53.21 PATIENT LEFT WITHOUT BEING SEEN: Primary | ICD-10-CM

## 2022-11-15 PROCEDURE — 3052F HG A1C>EQUAL 8.0%<EQUAL 9.0%: CPT | Mod: CPTII,95,, | Performed by: PHYSICIAN ASSISTANT

## 2022-11-15 PROCEDURE — 3052F PR MOST RECENT HEMOGLOBIN A1C LEVEL 8.0 - < 9.0%: ICD-10-PCS | Mod: CPTII,95,, | Performed by: PHYSICIAN ASSISTANT

## 2022-11-15 PROCEDURE — 3061F PR NEG MICROALBUMINURIA RESULT DOCUMENTED/REVIEW: ICD-10-PCS | Mod: CPTII,95,, | Performed by: PHYSICIAN ASSISTANT

## 2022-11-15 PROCEDURE — 4010F PR ACE/ARB THEARPY RXD/TAKEN: ICD-10-PCS | Mod: CPTII,95,, | Performed by: PHYSICIAN ASSISTANT

## 2022-11-15 PROCEDURE — 3066F NEPHROPATHY DOC TX: CPT | Mod: CPTII,95,, | Performed by: PHYSICIAN ASSISTANT

## 2022-11-15 PROCEDURE — 3066F PR DOCUMENTATION OF TREATMENT FOR NEPHROPATHY: ICD-10-PCS | Mod: CPTII,95,, | Performed by: PHYSICIAN ASSISTANT

## 2022-11-15 PROCEDURE — 4010F ACE/ARB THERAPY RXD/TAKEN: CPT | Mod: CPTII,95,, | Performed by: PHYSICIAN ASSISTANT

## 2022-11-15 PROCEDURE — 99499 UNLISTED E&M SERVICE: CPT | Mod: 95,,, | Performed by: PHYSICIAN ASSISTANT

## 2022-11-15 PROCEDURE — 99499 NO LOS: ICD-10-PCS | Mod: 95,,, | Performed by: PHYSICIAN ASSISTANT

## 2022-11-15 PROCEDURE — 3061F NEG MICROALBUMINURIA REV: CPT | Mod: CPTII,95,, | Performed by: PHYSICIAN ASSISTANT

## 2022-11-15 NOTE — PROGRESS NOTES
Unable to reach patient. Will need to reschedule visit. VALERIE.     Wang Pringle PA-C  Diabetes Management

## 2022-11-29 ENCOUNTER — OFFICE VISIT (OUTPATIENT)
Dept: DIABETES | Facility: CLINIC | Age: 52
End: 2022-11-29
Payer: MEDICAID

## 2022-11-29 DIAGNOSIS — E11.42 DIABETIC POLYNEUROPATHY ASSOCIATED WITH TYPE 2 DIABETES MELLITUS: ICD-10-CM

## 2022-11-29 DIAGNOSIS — E11.65 TYPE 2 DIABETES MELLITUS WITH HYPERGLYCEMIA, WITHOUT LONG-TERM CURRENT USE OF INSULIN: Primary | ICD-10-CM

## 2022-11-29 DIAGNOSIS — E11.69 DYSLIPIDEMIA ASSOCIATED WITH TYPE 2 DIABETES MELLITUS: ICD-10-CM

## 2022-11-29 DIAGNOSIS — K76.0 NAFLD (NONALCOHOLIC FATTY LIVER DISEASE): ICD-10-CM

## 2022-11-29 DIAGNOSIS — E78.5 DYSLIPIDEMIA ASSOCIATED WITH TYPE 2 DIABETES MELLITUS: ICD-10-CM

## 2022-11-29 DIAGNOSIS — G47.33 OBSTRUCTIVE SLEEP APNEA SYNDROME: ICD-10-CM

## 2022-11-29 DIAGNOSIS — E66.01 CLASS 2 SEVERE OBESITY DUE TO EXCESS CALORIES WITH SERIOUS COMORBIDITY AND BODY MASS INDEX (BMI) OF 38.0 TO 38.9 IN ADULT: ICD-10-CM

## 2022-11-29 DIAGNOSIS — I10 ESSENTIAL HYPERTENSION: ICD-10-CM

## 2022-11-29 PROCEDURE — 3052F HG A1C>EQUAL 8.0%<EQUAL 9.0%: CPT | Mod: CPTII,95,, | Performed by: PHYSICIAN ASSISTANT

## 2022-11-29 PROCEDURE — 99214 PR OFFICE/OUTPT VISIT, EST, LEVL IV, 30-39 MIN: ICD-10-PCS | Mod: 95,,, | Performed by: PHYSICIAN ASSISTANT

## 2022-11-29 PROCEDURE — 3066F PR DOCUMENTATION OF TREATMENT FOR NEPHROPATHY: ICD-10-PCS | Mod: CPTII,95,, | Performed by: PHYSICIAN ASSISTANT

## 2022-11-29 PROCEDURE — 3052F PR MOST RECENT HEMOGLOBIN A1C LEVEL 8.0 - < 9.0%: ICD-10-PCS | Mod: CPTII,95,, | Performed by: PHYSICIAN ASSISTANT

## 2022-11-29 PROCEDURE — 3066F NEPHROPATHY DOC TX: CPT | Mod: CPTII,95,, | Performed by: PHYSICIAN ASSISTANT

## 2022-11-29 PROCEDURE — 3061F NEG MICROALBUMINURIA REV: CPT | Mod: CPTII,95,, | Performed by: PHYSICIAN ASSISTANT

## 2022-11-29 PROCEDURE — 4010F PR ACE/ARB THEARPY RXD/TAKEN: ICD-10-PCS | Mod: CPTII,95,, | Performed by: PHYSICIAN ASSISTANT

## 2022-11-29 PROCEDURE — 4010F ACE/ARB THERAPY RXD/TAKEN: CPT | Mod: CPTII,95,, | Performed by: PHYSICIAN ASSISTANT

## 2022-11-29 PROCEDURE — 3061F PR NEG MICROALBUMINURIA RESULT DOCUMENTED/REVIEW: ICD-10-PCS | Mod: CPTII,95,, | Performed by: PHYSICIAN ASSISTANT

## 2022-11-29 PROCEDURE — 99214 OFFICE O/P EST MOD 30 MIN: CPT | Mod: 95,,, | Performed by: PHYSICIAN ASSISTANT

## 2022-11-29 RX ORDER — INSULIN LISPRO 100 [IU]/ML
20 INJECTION, SOLUTION INTRAVENOUS; SUBCUTANEOUS
Qty: 18 ML | Refills: 11 | Status: SHIPPED | OUTPATIENT
Start: 2022-11-29 | End: 2023-01-30 | Stop reason: SDUPTHER

## 2022-11-29 NOTE — PROGRESS NOTES
PCP: Primary Doctor No    Subjective:     Chief Complaint: Diabetes     Established Patient - Audio Only Telehealth Visit     The patient location is: Home  The chief complaint leading to consultation is: Diabetes follow up  Visit type: Virtual visit with audio only (telephone)  Total Time Spent with Patient: 5 minutes     The reason for the audio only service rather than synchronous audio and video virtual visit was related to technical difficulties or patient preference/necessity.     Each patient to whom I provide medical services by telemedicine is:  (1) informed of the relationship between the physician and patient and the respective role of any other health care provider with respect to management of the patient; and (2) notified that they may decline to receive medical services by telemedicine and may withdraw from such care at any time. Patient verbally consented to receive this service via voice-only telephone call.    This service was not originating from a related E/M service provided within the previous 7 days nor will  to an E/M service or procedure within the next 24 hours or my soonest available appointment.  Prevailing standard of care was able to be met in this audio-only visit.      HISTORY OF PRESENT ILLNESS: 52 y.o.   male presenting for diabetes management.   The patient's last visit with me was on 10/19/2022.  Patient has had Type II diabetes since approximately 10 years ago. .  Pertinent to decision making is the following comorbidities: HTN, HLD, Fatty Liver, Obesity by BMI and SOPHIE   Patient has the following Diabetes complications: with diabetic polyneuropathy  He  has attended diabetes education in the past.     Patient's most recent A1c of 9.0% was completed 6 weeks ago.   Patient states since His last A1c His blood glucose levels have been high  throughout the day .   Patient monitors blood glucose 3 times per day with meter : Fasting, Before Lunch and Before Dinner.    Patient blood glucose monitoring device will not be uploaded into Media Section today secondary to patient forgetting blood glucose monitoring device.   Per patient, fasting blood sugars ranging from 140 - 150 and before dinner less than 160 and after dinner 180 -  200s.  Patient endorses the following diabetes related symptoms:  None .  Patient is due today for the following diabetes-related health maintenance standards: Foot Exam , COVID-19 Vaccine , and Colorectal Cancer screening .   He denies recent hospital admissions or emergency room visits.   He denies having hypoglycemia.   Patient's concerns today include glycemic control.  Patient medication regimen is as below.     CURRENT DM MEDICATIONS:   Humalog 15 units TID wm  Metformin 1000 mg BID    Actos 15 mg daily  Farxiga 10 mg daily    Patient has failed the following Diabetes medications:   Trulicity /Victoza  - nausea   Toujeo   Humalog   Januvia  Jardiance - blurry vision   Glimepiride      Labs Reviewed.       No results found for: CPEPTIDE  No results found for: GLUTAMICACID       //   , There is no height or weight on file to calculate BMI.  His blood sugar in clinic today is:      Review of Systems   Constitutional:  Negative for activity change, appetite change, chills and fever.   HENT:  Negative for dental problem, mouth sores, nosebleeds, sore throat and trouble swallowing.    Eyes:  Negative for pain and discharge.   Respiratory:  Negative for shortness of breath, wheezing and stridor.    Cardiovascular:  Negative for chest pain, palpitations and leg swelling.   Gastrointestinal:  Negative for abdominal pain, diarrhea, nausea and vomiting.   Endocrine: Negative for polydipsia, polyphagia and polyuria.   Genitourinary:  Negative for dysuria, frequency and urgency.   Musculoskeletal:  Negative for joint swelling and myalgias.   Skin:  Negative for rash and wound.   Neurological:  Negative for dizziness, syncope, weakness and headaches.    Psychiatric/Behavioral:  Negative for behavioral problems and dysphoric mood.        Diabetes Management Status  Statin: Taking  ACE/ARB: Not taking    Screening or Prevention Patient's value Goal Complete/Controlled?   HgA1C Testing and Control   Lab Results   Component Value Date    HGBA1C 9.0 (H) 10/12/2022      Annually/Less than 8% Yes   Lipid profile : 10/12/2022 Annually No   LDL control Lab Results   Component Value Date    LDLCALC 76.0 10/12/2022    Annually/Less than 100 mg/dl  No   Nephropathy screening Lab Results   Component Value Date    MICALBCREAT 25.0 10/12/2022     No results found for: PROTEINUA Annually No   Blood pressure BP Readings from Last 1 Encounters:   11/05/21 (!) 160/80    Less than 140/90 No   Dilated retinal exam : 06/16/2022 Annually No    Foot exam   : 05/28/2021 Annually Yes     Social History     Socioeconomic History    Marital status:    Tobacco Use    Smoking status: Former   Substance and Sexual Activity    Alcohol use: Never    Drug use: Never    Sexual activity: Yes     Partners: Female     Past Medical History:   Diagnosis Date    Arthritis     Chronic bilateral low back pain without sciatica 7/27/2020    X-ray Lumbar Spine December, 2019: Dextroscoliosis of the lumbar spine is noted.  There is minimal grade 1 retrolisthesis of L2 on L3 and L3 on L4.  No fracture is seen.  Degenerative changes are noted with spurring of the endplates and lower lumbar facet arthropathy.  Mild disc space narrowing at L2-L3.    Class 2 severe obesity due to excess calories with serious comorbidity and body mass index (BMI) of 38.0 to 38.9 in adult 9/22/2020    Diabetes mellitus, type 2     Essential hypertension 1/21/2020    Hepatosplenomegaly 10/7/2020    US ABDOMEN LIMITED 09/30/2020 FINDINGS: Liver: Increased in size, measuring 21.3 cm. Homogeneous increased echotexture. No focal hepatic lesions. Gallbladder: No calculi, wall thickening, or pericholecystic fluid. No sonographic  Hdez's sign. Biliary system: The common duct is not dilated, measuring 5 mm. No intrahepatic ductal dilatation. Spleen: Increased in size and echotexture, measuring 15.9    Hyperlipidemia     Hypertension     Lesion of spleen 10/7/2020    US ABDOMEN LIMITED 09/30/2020 Spleen: Increased in size and echotexture, measuring 15.9 cm. There is heterogeneous area in the spleen which measures 3 x 2.2 x 2.1 cm with more central area of isoechogenicity with peripheral hypoechoic region. IMPRESSION: ...Indeterminate 3 cm heterogeneous mass in the spleen, potentially a hemangioma although other etiologies possible as well. Consider attention o    NAFLD (nonalcoholic fatty liver disease) 10/7/2020    US ABDOMEN LIMITED 09/30/2020 FINDINGS: Liver: Increased in size, measuring 21.3 cm. Homogeneous increased echotexture. No focal hepatic lesions. Gallbladder: No calculi, wall thickening, or pericholecystic fluid. No sonographic Hdez's sign. Biliary system: The common duct is not dilated, measuring 5 mm. No intrahepatic ductal dilatation. Spleen: Increased in size and echotexture, measuring 15.9    Obstructive sleep apnea syndrome 7/27/2020    Spondylosis of lumbar region without myelopathy or radiculopathy 7/27/2020    Type 2 diabetes mellitus with diabetic polyneuropathy, without long-term current use of insulin 7/27/2020    Type 2 diabetes mellitus with other specified complication 4/21/2020    Uncontrolled type 2 diabetes mellitus with hyperglycemia 4/21/2020       Objective:      Physical Exam  Neurological:      Mental Status: He is alert and oriented to person, place, and time. Mental status is at baseline.   Psychiatric:         Mood and Affect: Mood normal.         Behavior: Behavior normal.         Thought Content: Thought content normal.         Judgment: Judgment normal.         Assessment / Plan:     Type 2 diabetes mellitus with hyperglycemia, without long-term current use of insulin  -     insulin lispro 100  unit/mL pen; Inject 20 Units into the skin 3 (three) times daily with meals.  Dispense: 18 mL; Refill: 11    Diabetic polyneuropathy associated with type 2 diabetes mellitus    Essential hypertension    Dyslipidemia associated with type 2 diabetes mellitus    NAFLD (nonalcoholic fatty liver disease)    Obstructive sleep apnea syndrome    Class 2 severe obesity due to excess calories with serious comorbidity and body mass index (BMI) of 38.0 to 38.9 in adult    Additional Plan Details:    - POCT Glucose  - Encouraged continuation of lifestyle changes including regular exercise and limiting carbohydrates to 30-45 grams per meal threes times daily and 15 grams per snack with a limit of two daily.   - Encouraged continued monitoring of blood glucose with maintenance of 3 times daily at Fasting, Before Lunch and Before Dinner.   - Current DM Medication Regimen: Change Humalog 20 units TID wm. continue Farxiga 10 mg daily. Continue Metformin 1000 mg BID. Continue Actos 15 mg daily. If no improvement, will place pro CGM vs CGM sample for review.   - Health Maintenance standards addressed today: Foot Exam - deferred by patient today because Telemedicine or Telephone visit and Colorectal Cancer screening - discussed; would like to discuss Fit Kit with PCP .   - Nursing Visit: Patient is below goal range for nursing visit for age group and will not need nursing visit at this time .   - Follow up with me in 4 weeks with A1c prior       Blakeney McKnight, PA-C [facility]

## 2022-12-19 ENCOUNTER — OFFICE VISIT (OUTPATIENT)
Dept: DIABETES | Facility: CLINIC | Age: 52
End: 2022-12-19
Payer: MEDICAID

## 2022-12-19 VITALS
WEIGHT: 294.75 LBS | SYSTOLIC BLOOD PRESSURE: 134 MMHG | BODY MASS INDEX: 37.83 KG/M2 | HEIGHT: 74 IN | DIASTOLIC BLOOD PRESSURE: 87 MMHG | HEART RATE: 79 BPM

## 2022-12-19 DIAGNOSIS — E11.42 DIABETIC POLYNEUROPATHY ASSOCIATED WITH TYPE 2 DIABETES MELLITUS: ICD-10-CM

## 2022-12-19 DIAGNOSIS — G47.33 OBSTRUCTIVE SLEEP APNEA SYNDROME: ICD-10-CM

## 2022-12-19 DIAGNOSIS — E11.65 TYPE 2 DIABETES MELLITUS WITH HYPERGLYCEMIA, WITHOUT LONG-TERM CURRENT USE OF INSULIN: Primary | ICD-10-CM

## 2022-12-19 DIAGNOSIS — E66.01 CLASS 2 SEVERE OBESITY DUE TO EXCESS CALORIES WITH SERIOUS COMORBIDITY AND BODY MASS INDEX (BMI) OF 38.0 TO 38.9 IN ADULT: ICD-10-CM

## 2022-12-19 DIAGNOSIS — K76.0 NAFLD (NONALCOHOLIC FATTY LIVER DISEASE): ICD-10-CM

## 2022-12-19 DIAGNOSIS — E78.5 DYSLIPIDEMIA ASSOCIATED WITH TYPE 2 DIABETES MELLITUS: ICD-10-CM

## 2022-12-19 DIAGNOSIS — E11.69 DYSLIPIDEMIA ASSOCIATED WITH TYPE 2 DIABETES MELLITUS: ICD-10-CM

## 2022-12-19 DIAGNOSIS — I10 ESSENTIAL HYPERTENSION: ICD-10-CM

## 2022-12-19 LAB — GLUCOSE SERPL-MCNC: 263 MG/DL (ref 70–110)

## 2022-12-19 PROCEDURE — 3079F PR MOST RECENT DIASTOLIC BLOOD PRESSURE 80-89 MM HG: ICD-10-PCS | Mod: CPTII,,, | Performed by: PHYSICIAN ASSISTANT

## 2022-12-19 PROCEDURE — 4010F PR ACE/ARB THEARPY RXD/TAKEN: ICD-10-PCS | Mod: CPTII,,, | Performed by: PHYSICIAN ASSISTANT

## 2022-12-19 PROCEDURE — 99999 PR PBB SHADOW E&M-EST. PATIENT-LVL IV: CPT | Mod: PBBFAC,,, | Performed by: PHYSICIAN ASSISTANT

## 2022-12-19 PROCEDURE — 3052F PR MOST RECENT HEMOGLOBIN A1C LEVEL 8.0 - < 9.0%: ICD-10-PCS | Mod: CPTII,,, | Performed by: PHYSICIAN ASSISTANT

## 2022-12-19 PROCEDURE — 3066F PR DOCUMENTATION OF TREATMENT FOR NEPHROPATHY: ICD-10-PCS | Mod: CPTII,,, | Performed by: PHYSICIAN ASSISTANT

## 2022-12-19 PROCEDURE — 82962 GLUCOSE BLOOD TEST: CPT | Mod: PBBFAC | Performed by: PHYSICIAN ASSISTANT

## 2022-12-19 PROCEDURE — 3061F NEG MICROALBUMINURIA REV: CPT | Mod: CPTII,,, | Performed by: PHYSICIAN ASSISTANT

## 2022-12-19 PROCEDURE — 3066F NEPHROPATHY DOC TX: CPT | Mod: CPTII,,, | Performed by: PHYSICIAN ASSISTANT

## 2022-12-19 PROCEDURE — 3008F BODY MASS INDEX DOCD: CPT | Mod: CPTII,,, | Performed by: PHYSICIAN ASSISTANT

## 2022-12-19 PROCEDURE — 3079F DIAST BP 80-89 MM HG: CPT | Mod: CPTII,,, | Performed by: PHYSICIAN ASSISTANT

## 2022-12-19 PROCEDURE — 3075F SYST BP GE 130 - 139MM HG: CPT | Mod: CPTII,,, | Performed by: PHYSICIAN ASSISTANT

## 2022-12-19 PROCEDURE — 4010F ACE/ARB THERAPY RXD/TAKEN: CPT | Mod: CPTII,,, | Performed by: PHYSICIAN ASSISTANT

## 2022-12-19 PROCEDURE — 3052F HG A1C>EQUAL 8.0%<EQUAL 9.0%: CPT | Mod: CPTII,,, | Performed by: PHYSICIAN ASSISTANT

## 2022-12-19 PROCEDURE — 3075F PR MOST RECENT SYSTOLIC BLOOD PRESS GE 130-139MM HG: ICD-10-PCS | Mod: CPTII,,, | Performed by: PHYSICIAN ASSISTANT

## 2022-12-19 PROCEDURE — 3008F PR BODY MASS INDEX (BMI) DOCUMENTED: ICD-10-PCS | Mod: CPTII,,, | Performed by: PHYSICIAN ASSISTANT

## 2022-12-19 PROCEDURE — 99214 OFFICE O/P EST MOD 30 MIN: CPT | Mod: S$PBB,,, | Performed by: PHYSICIAN ASSISTANT

## 2022-12-19 PROCEDURE — 3061F PR NEG MICROALBUMINURIA RESULT DOCUMENTED/REVIEW: ICD-10-PCS | Mod: CPTII,,, | Performed by: PHYSICIAN ASSISTANT

## 2022-12-19 PROCEDURE — 99214 OFFICE O/P EST MOD 30 MIN: CPT | Mod: PBBFAC | Performed by: PHYSICIAN ASSISTANT

## 2022-12-19 PROCEDURE — 99999 PR PBB SHADOW E&M-EST. PATIENT-LVL IV: ICD-10-PCS | Mod: PBBFAC,,, | Performed by: PHYSICIAN ASSISTANT

## 2022-12-19 PROCEDURE — 1159F MED LIST DOCD IN RCRD: CPT | Mod: CPTII,,, | Performed by: PHYSICIAN ASSISTANT

## 2022-12-19 PROCEDURE — 99214 PR OFFICE/OUTPT VISIT, EST, LEVL IV, 30-39 MIN: ICD-10-PCS | Mod: S$PBB,,, | Performed by: PHYSICIAN ASSISTANT

## 2022-12-19 PROCEDURE — 1159F PR MEDICATION LIST DOCUMENTED IN MEDICAL RECORD: ICD-10-PCS | Mod: CPTII,,, | Performed by: PHYSICIAN ASSISTANT

## 2022-12-19 NOTE — PROGRESS NOTES
Pt was seen today by LoydShawn. provider wanted pt to be put on olivia 3. Pt downloaded the deanna on his phone. I cleaned pt's left upper arm placed the CGM on pt. Pt understood what he needed to do. Pt is sharing. Pt did not have any other questions

## 2022-12-19 NOTE — PATIENT INSTRUCTIONS
CURRENT DM MEDICATIONS:   Humalog 25 units before meals 3x   Humalog correction dosing every 3 hours as below  Metformin 1000 mg twice a day    Actos 15 mg daily  Farxiga 10 mg daily    Humalog Correction Dosing every 3 hours as needed OUTSIDE OF EATING  If  - 250, may take 4 units of Humalog  If  - 300, may take 8 units of Humalog   If  - 350, may take 12 units of Humalog  If  - 400, may take 16 units of Humalog  If +, may take 20 units of Humalog

## 2022-12-19 NOTE — PROGRESS NOTES
"PCP: Primary Doctor No    Subjective:     Chief Complaint: Diabetes     HISTORY OF PRESENT ILLNESS: 52 y.o.   male presenting for diabetes management.   The patient's last visit with me was on 11/29/2022.   Patient has had Type II diabetes since approximately 10 years ago. .  Pertinent to decision making is the following comorbidities: HTN, HLD, Fatty Liver, Obesity by BMI and SOPHIE   Patient has the following Diabetes complications: with diabetic polyneuropathy  He  has attended diabetes education in the past.     Patient's most recent A1c of 9.0% was completed 8 weeks ago.   Patient states since His last A1c His blood glucose levels have been high  throughout the day .   Patient monitors blood glucose 3 times per day with meter : Fasting, Before Lunch and Before Dinner.   Patient blood glucose monitoring device will not be uploaded into Media Section today secondary to patient forgetting blood glucose monitoring device.   Per patient, fasting blood sugars ranging from 138 - 160, after breakfast 263 in clinic, and after dinner 165  - 200s.  Patient endorses the following diabetes related symptoms:  None .  Patient is due today for the following diabetes-related health maintenance standards: Foot Exam  and Colorectal Cancer screening .   He denies recent hospital admissions or emergency room visits.   He denies having hypoglycemia.   Patient's concerns today include glycemic control.  Patient medication regimen is as below.     CURRENT DM MEDICATIONS:   Humalog 20 units TID wm  Metformin 1000 mg BID    Actos 15 mg daily  Farxiga 10 mg daily    Patient has failed the following Diabetes medications:   Trulicity /Victoza  - nausea   Toujeo   Humalog   Januvia  Jardiance - blurry vision   Glimepiride      Labs Reviewed.       No results found for: CPEPTIDE  No results found for: GLUTAMICACID    Height: 6' 2" (188 cm)  //   , Body mass index is 38.33 kg/m².  His blood sugar in clinic today is:      Review of " Systems   Constitutional:  Negative for activity change, appetite change, chills and fever.   HENT:  Negative for dental problem, mouth sores, nosebleeds, sore throat and trouble swallowing.    Eyes:  Negative for pain and discharge.   Respiratory:  Negative for shortness of breath, wheezing and stridor.    Cardiovascular:  Negative for chest pain, palpitations and leg swelling.   Gastrointestinal:  Negative for abdominal pain, diarrhea, nausea and vomiting.   Endocrine: Negative for polydipsia, polyphagia and polyuria.   Genitourinary:  Negative for dysuria, frequency and urgency.   Musculoskeletal:  Negative for joint swelling and myalgias.   Skin:  Negative for rash and wound.   Neurological:  Negative for dizziness, syncope, weakness and headaches.   Psychiatric/Behavioral:  Negative for behavioral problems and dysphoric mood.        Diabetes Management Status  Statin: Taking  ACE/ARB: Not taking    Screening or Prevention Patient's value Goal Complete/Controlled?   HgA1C Testing and Control   Lab Results   Component Value Date    HGBA1C 9.0 (H) 10/12/2022      Annually/Less than 8% Yes   Lipid profile : 10/12/2022 Annually No   LDL control Lab Results   Component Value Date    LDLCALC 76.0 10/12/2022    Annually/Less than 100 mg/dl  No   Nephropathy screening Lab Results   Component Value Date    MICALBCREAT 25.0 10/12/2022     No results found for: PROTEINUA Annually No   Blood pressure BP Readings from Last 1 Encounters:   11/05/21 (!) 160/80    Less than 140/90 No   Dilated retinal exam : 06/16/2022 Annually No    Foot exam   : 05/28/2021 Annually Yes     Social History     Socioeconomic History    Marital status:    Tobacco Use    Smoking status: Former   Substance and Sexual Activity    Alcohol use: Never    Drug use: Never    Sexual activity: Yes     Partners: Female     Past Medical History:   Diagnosis Date    Arthritis     Chronic bilateral low back pain without sciatica 7/27/2020    X-ray  Lumbar Spine December, 2019: Dextroscoliosis of the lumbar spine is noted.  There is minimal grade 1 retrolisthesis of L2 on L3 and L3 on L4.  No fracture is seen.  Degenerative changes are noted with spurring of the endplates and lower lumbar facet arthropathy.  Mild disc space narrowing at L2-L3.    Class 2 severe obesity due to excess calories with serious comorbidity and body mass index (BMI) of 38.0 to 38.9 in adult 9/22/2020    Diabetes mellitus, type 2     Essential hypertension 1/21/2020    Hepatosplenomegaly 10/7/2020    US ABDOMEN LIMITED 09/30/2020 FINDINGS: Liver: Increased in size, measuring 21.3 cm. Homogeneous increased echotexture. No focal hepatic lesions. Gallbladder: No calculi, wall thickening, or pericholecystic fluid. No sonographic Hdez's sign. Biliary system: The common duct is not dilated, measuring 5 mm. No intrahepatic ductal dilatation. Spleen: Increased in size and echotexture, measuring 15.9    Hyperlipidemia     Hypertension     Lesion of spleen 10/7/2020    US ABDOMEN LIMITED 09/30/2020 Spleen: Increased in size and echotexture, measuring 15.9 cm. There is heterogeneous area in the spleen which measures 3 x 2.2 x 2.1 cm with more central area of isoechogenicity with peripheral hypoechoic region. IMPRESSION: ...Indeterminate 3 cm heterogeneous mass in the spleen, potentially a hemangioma although other etiologies possible as well. Consider attention o    NAFLD (nonalcoholic fatty liver disease) 10/7/2020    US ABDOMEN LIMITED 09/30/2020 FINDINGS: Liver: Increased in size, measuring 21.3 cm. Homogeneous increased echotexture. No focal hepatic lesions. Gallbladder: No calculi, wall thickening, or pericholecystic fluid. No sonographic Hdez's sign. Biliary system: The common duct is not dilated, measuring 5 mm. No intrahepatic ductal dilatation. Spleen: Increased in size and echotexture, measuring 15.9    Obstructive sleep apnea syndrome 7/27/2020    Spondylosis of lumbar region  without myelopathy or radiculopathy 7/27/2020    Type 2 diabetes mellitus with diabetic polyneuropathy, without long-term current use of insulin 7/27/2020    Type 2 diabetes mellitus with other specified complication 4/21/2020    Uncontrolled type 2 diabetes mellitus with hyperglycemia 4/21/2020       Objective:      Physical Exam  Constitutional:       General: He is not in acute distress.     Appearance: He is well-developed. He is not diaphoretic.   HENT:      Head: Normocephalic and atraumatic.      Right Ear: External ear normal.      Left Ear: External ear normal.      Nose: Nose normal.   Eyes:      General:         Right eye: No discharge.         Left eye: No discharge.      Pupils: Pupils are equal, round, and reactive to light.   Cardiovascular:      Rate and Rhythm: Normal rate and regular rhythm.      Pulses:           Dorsalis pedis pulses are 2+ on the right side and 2+ on the left side.        Posterior tibial pulses are 2+ on the right side and 2+ on the left side.      Heart sounds: Normal heart sounds.   Pulmonary:      Effort: Pulmonary effort is normal.      Breath sounds: Normal breath sounds.   Abdominal:      Palpations: Abdomen is soft.   Musculoskeletal:         General: Normal range of motion.      Cervical back: Normal range of motion and neck supple.      Right foot: Normal range of motion. No deformity.      Left foot: Normal range of motion. No deformity.   Feet:      Right foot:      Protective Sensation: 6 sites tested.  4 sites sensed.      Skin integrity: Dry skin present. No ulcer, blister, skin breakdown, erythema, warmth or callus.      Left foot:      Protective Sensation: 6 sites tested.  4 sites sensed.      Skin integrity: Dry skin present. No ulcer, blister, skin breakdown, erythema, warmth or callus.   Skin:     General: Skin is warm and dry.      Capillary Refill: Capillary refill takes less than 2 seconds.   Neurological:      Mental Status: He is alert and oriented to  person, place, and time. Mental status is at baseline.   Psychiatric:         Mood and Affect: Mood normal.         Behavior: Behavior normal.         Thought Content: Thought content normal.         Judgment: Judgment normal.         Assessment / Plan:     Type 2 diabetes mellitus with hyperglycemia, without long-term current use of insulin  -     POCT Glucose, Hand-Held Device  -     GLUCOSE MONITORING CONTINUOUS MIN 72 HOURS; Future    Diabetic polyneuropathy associated with type 2 diabetes mellitus    Essential hypertension    Dyslipidemia associated with type 2 diabetes mellitus    NAFLD (nonalcoholic fatty liver disease)    Obstructive sleep apnea syndrome    Class 2 severe obesity due to excess calories with serious comorbidity and body mass index (BMI) of 38.0 to 38.9 in adult      Additional Plan Details:    - POCT Glucose  - Encouraged continuation of lifestyle changes including regular exercise and limiting carbohydrates to 30-45 grams per meal threes times daily and 15 grams per snack with a limit of two daily.   - Encouraged continued monitoring of blood glucose with maintenance of 3 times daily at Fasting, Before Lunch and Before Dinner. Eduardo 3 sample placement - see below.   - Current DM Medication Regimen: Change Humalog 25 units TID wm. continue Farxiga 10 mg daily. Continue Metformin 1000 mg BID. Continue Actos 15 mg daily. I  - Health Maintenance standards addressed today: Foot Exam - completed today and documented in physical exam with feedback to patient about proper diabetic foot care and findings and Colorectal Cancer screening - discussed; would like to discuss Fit Kit with PCP .   - Nursing Visit: Patient is below goal range for nursing visit for age group and will not need nursing visit at this time .   - Follow up with me in 2 weeks with A1c prior     Humalog Correction Dosing every 3 hours as needed OUTSIDE OF EATING  If  - 250, may take 4 units of Humalog  If  - 300, may  "take 8 units of Humalog   If  - 350, may take 12 units of Humalog  If  - 400, may take 16 units of Humalog  If +, may take 20 units of Humalog    FREESTYLE DAMASO 3 SAMPLE CGM TRAINING  Freestyle Damaso 3 Sample CGM Kit provided today in clinic. Sensor was placed on pts Left Arm today.  Freestyle Damaso 3 mobile deanna downloaded to phone. Education was provided using "Quick Start Guide" per Freestyle Damaso 3 protocol.  Alert Settings Set:  · High Alert 250  · Low Alert 70  o Remove the Sensor before MRI, CT scan, X-ray, or diathermy treatment.  o Patient advised to always check glucose with glucometer should readings do not match symptoms felt (ex. Hypo or hyperglycemia).  Patient will have remote sensor download in 2 weeks. Patient instructed on removal and how to discard after 14 days.       Blakeney McKnight, PA-C [facility]    A total of 30 minutes was spent in face to face time, of which over 50 % was spent in counseling patient on disease process, complications, treatment, and side effects of medications.                                  "

## 2022-12-23 ENCOUNTER — OFFICE VISIT (OUTPATIENT)
Dept: DIABETES | Facility: CLINIC | Age: 52
End: 2022-12-23
Payer: MEDICAID

## 2022-12-23 DIAGNOSIS — K76.0 NAFLD (NONALCOHOLIC FATTY LIVER DISEASE): ICD-10-CM

## 2022-12-23 DIAGNOSIS — E11.42 DIABETIC POLYNEUROPATHY ASSOCIATED WITH TYPE 2 DIABETES MELLITUS: ICD-10-CM

## 2022-12-23 DIAGNOSIS — E11.65 TYPE 2 DIABETES MELLITUS WITH HYPERGLYCEMIA, WITHOUT LONG-TERM CURRENT USE OF INSULIN: Primary | ICD-10-CM

## 2022-12-23 DIAGNOSIS — G47.33 OBSTRUCTIVE SLEEP APNEA SYNDROME: ICD-10-CM

## 2022-12-23 DIAGNOSIS — E66.9 OBESITY (BMI 30-39.9): ICD-10-CM

## 2022-12-23 DIAGNOSIS — E78.5 DYSLIPIDEMIA ASSOCIATED WITH TYPE 2 DIABETES MELLITUS: ICD-10-CM

## 2022-12-23 DIAGNOSIS — I10 ESSENTIAL HYPERTENSION: ICD-10-CM

## 2022-12-23 DIAGNOSIS — E11.69 DYSLIPIDEMIA ASSOCIATED WITH TYPE 2 DIABETES MELLITUS: ICD-10-CM

## 2022-12-23 PROCEDURE — 3061F NEG MICROALBUMINURIA REV: CPT | Mod: CPTII,95,, | Performed by: PHYSICIAN ASSISTANT

## 2022-12-23 PROCEDURE — 99213 PR OFFICE/OUTPT VISIT, EST, LEVL III, 20-29 MIN: ICD-10-PCS | Mod: 95,,, | Performed by: PHYSICIAN ASSISTANT

## 2022-12-23 PROCEDURE — 3061F PR NEG MICROALBUMINURIA RESULT DOCUMENTED/REVIEW: ICD-10-PCS | Mod: CPTII,95,, | Performed by: PHYSICIAN ASSISTANT

## 2022-12-23 PROCEDURE — 3052F HG A1C>EQUAL 8.0%<EQUAL 9.0%: CPT | Mod: CPTII,95,, | Performed by: PHYSICIAN ASSISTANT

## 2022-12-23 PROCEDURE — 3066F NEPHROPATHY DOC TX: CPT | Mod: CPTII,95,, | Performed by: PHYSICIAN ASSISTANT

## 2022-12-23 PROCEDURE — 99213 OFFICE O/P EST LOW 20 MIN: CPT | Mod: 95,,, | Performed by: PHYSICIAN ASSISTANT

## 2022-12-23 PROCEDURE — 3066F PR DOCUMENTATION OF TREATMENT FOR NEPHROPATHY: ICD-10-PCS | Mod: CPTII,95,, | Performed by: PHYSICIAN ASSISTANT

## 2022-12-23 PROCEDURE — 3052F PR MOST RECENT HEMOGLOBIN A1C LEVEL 8.0 - < 9.0%: ICD-10-PCS | Mod: CPTII,95,, | Performed by: PHYSICIAN ASSISTANT

## 2022-12-23 PROCEDURE — 4010F ACE/ARB THERAPY RXD/TAKEN: CPT | Mod: CPTII,95,, | Performed by: PHYSICIAN ASSISTANT

## 2022-12-23 PROCEDURE — 4010F PR ACE/ARB THEARPY RXD/TAKEN: ICD-10-PCS | Mod: CPTII,95,, | Performed by: PHYSICIAN ASSISTANT

## 2022-12-23 NOTE — PROGRESS NOTES
PCP: Primary Doctor No    Subjective:     Chief Complaint: Diabetes     Established Patient - Audio Only Telehealth Visit     The patient location is: Home  The chief complaint leading to consultation is: Diabetes follow up  Visit type: Virtual visit with audio only (telephone)  Total Time Spent with Patient: 6 minutes     The reason for the audio only service rather than synchronous audio and video virtual visit was related to technical difficulties or patient preference/necessity.     Each patient to whom I provide medical services by telemedicine is:  (1) informed of the relationship between the physician and patient and the respective role of any other health care provider with respect to management of the patient; and (2) notified that they may decline to receive medical services by telemedicine and may withdraw from such care at any time. Patient verbally consented to receive this service via voice-only telephone call.    This service was not originating from a related E/M service provided within the previous 7 days nor will  to an E/M service or procedure within the next 24 hours or my soonest available appointment.  Prevailing standard of care was able to be met in this audio-only visit.       HISTORY OF PRESENT ILLNESS: 52 y.o.   male presenting for diabetes management.   The patient's last visit with me was on 12/19/2022.  Patient has had Type II diabetes since approximately 10 years ago. .  Pertinent to decision making is the following comorbidities: HTN, HLD, Fatty Liver, Obesity by BMI and SOPHIE   Patient has the following Diabetes complications: with diabetic polyneuropathy  He  has attended diabetes education in the past.     Patient's most recent A1c of 9.0% was completed 8 weeks ago.   Patient states since His last A1c His blood glucose levels have been high  throughout the day .   Patient monitors blood glucose 3 times per day with meter : Fasting, Before Lunch and Before Dinner.  Sample Eduardo 3 CGM placed at last visit and will be reviewed today.   Patient blood glucose monitoring device will be uploaded into Media Section today.      Patient records show improving postprandial coverage but some postprandial hyperglycemia at night time with snack of fruit. Some baseline euglycemia - may benefit from basal coverage.   Patient endorses the following diabetes related symptoms:  None .  Patient is due today for the following diabetes-related health maintenance standards:  Colorectal Cancer screening .   He denies recent hospital admissions or emergency room visits.   He denies having hypoglycemia.   Patient's concerns today include glycemic control.  Patient medication regimen is as below.     CURRENT DM MEDICATIONS:   Humalog 25 units TID wm  Metformin 1000 mg BID    Actos 15 mg daily  Farxiga 10 mg daily    Patient has failed the following Diabetes medications:   Trulicity /Victoza  - nausea   Toujeo   Humalog   Januvia  Jardiance - blurry vision   Glimepiride      Labs Reviewed.       No results found for: CPEPTIDE  No results found for: GLUTAMICACID       //   , There is no height or weight on file to calculate BMI.  His blood sugar in clinic today is:      Review of Systems   Constitutional:  Negative for activity change, appetite change, chills and fever.   HENT:  Negative for dental problem, mouth sores, nosebleeds, sore throat and trouble swallowing.    Eyes:  Negative for pain and discharge.   Respiratory:  Negative for shortness of breath, wheezing and stridor.    Cardiovascular:  Negative for chest pain, palpitations and leg swelling.   Gastrointestinal:  Negative for abdominal pain, diarrhea, nausea and vomiting.   Endocrine: Negative for polydipsia, polyphagia and polyuria.   Genitourinary:  Negative for dysuria, frequency and urgency.   Musculoskeletal:  Negative for joint swelling and myalgias.   Skin:  Negative for rash and wound.   Neurological:  Negative for dizziness, syncope,  weakness and headaches.   Psychiatric/Behavioral:  Negative for behavioral problems and dysphoric mood.        Diabetes Management Status  Statin: Taking  ACE/ARB: Not taking    Screening or Prevention Patient's value Goal Complete/Controlled?   HgA1C Testing and Control   Lab Results   Component Value Date    HGBA1C 9.0 (H) 10/12/2022      Annually/Less than 8% Yes   Lipid profile : 10/12/2022 Annually No   LDL control Lab Results   Component Value Date    LDLCALC 76.0 10/12/2022    Annually/Less than 100 mg/dl  No   Nephropathy screening Lab Results   Component Value Date    MICALBCREAT 25.0 10/12/2022     No results found for: PROTEINUA Annually No   Blood pressure BP Readings from Last 1 Encounters:   12/19/22 134/87    Less than 140/90 No   Dilated retinal exam : 06/16/2022 Annually No    Foot exam   : 12/19/2022 Annually Yes     Social History     Socioeconomic History    Marital status:    Tobacco Use    Smoking status: Former   Substance and Sexual Activity    Alcohol use: Never    Drug use: Never    Sexual activity: Yes     Partners: Female     Past Medical History:   Diagnosis Date    Arthritis     Chronic bilateral low back pain without sciatica 7/27/2020    X-ray Lumbar Spine December, 2019: Dextroscoliosis of the lumbar spine is noted.  There is minimal grade 1 retrolisthesis of L2 on L3 and L3 on L4.  No fracture is seen.  Degenerative changes are noted with spurring of the endplates and lower lumbar facet arthropathy.  Mild disc space narrowing at L2-L3.    Class 2 severe obesity due to excess calories with serious comorbidity and body mass index (BMI) of 38.0 to 38.9 in adult 9/22/2020    Diabetes mellitus, type 2     Essential hypertension 1/21/2020    Hepatosplenomegaly 10/7/2020    US ABDOMEN LIMITED 09/30/2020 FINDINGS: Liver: Increased in size, measuring 21.3 cm. Homogeneous increased echotexture. No focal hepatic lesions. Gallbladder: No calculi, wall thickening, or pericholecystic  fluid. No sonographic Hdez's sign. Biliary system: The common duct is not dilated, measuring 5 mm. No intrahepatic ductal dilatation. Spleen: Increased in size and echotexture, measuring 15.9    Hyperlipidemia     Hypertension     Lesion of spleen 10/7/2020    US ABDOMEN LIMITED 09/30/2020 Spleen: Increased in size and echotexture, measuring 15.9 cm. There is heterogeneous area in the spleen which measures 3 x 2.2 x 2.1 cm with more central area of isoechogenicity with peripheral hypoechoic region. IMPRESSION: ...Indeterminate 3 cm heterogeneous mass in the spleen, potentially a hemangioma although other etiologies possible as well. Consider attention o    NAFLD (nonalcoholic fatty liver disease) 10/7/2020    US ABDOMEN LIMITED 09/30/2020 FINDINGS: Liver: Increased in size, measuring 21.3 cm. Homogeneous increased echotexture. No focal hepatic lesions. Gallbladder: No calculi, wall thickening, or pericholecystic fluid. No sonographic Hdez's sign. Biliary system: The common duct is not dilated, measuring 5 mm. No intrahepatic ductal dilatation. Spleen: Increased in size and echotexture, measuring 15.9    Obstructive sleep apnea syndrome 7/27/2020    Spondylosis of lumbar region without myelopathy or radiculopathy 7/27/2020    Type 2 diabetes mellitus with diabetic polyneuropathy, without long-term current use of insulin 7/27/2020    Type 2 diabetes mellitus with other specified complication 4/21/2020    Uncontrolled type 2 diabetes mellitus with hyperglycemia 4/21/2020       Objective:      Physical Exam  Neurological:      Mental Status: He is alert and oriented to person, place, and time. Mental status is at baseline.   Psychiatric:         Mood and Affect: Mood normal.         Behavior: Behavior normal.         Thought Content: Thought content normal.         Judgment: Judgment normal.         Assessment / Plan:     Type 2 diabetes mellitus with hyperglycemia, without long-term current use of  insulin    Diabetic polyneuropathy associated with type 2 diabetes mellitus    Essential hypertension    Dyslipidemia associated with type 2 diabetes mellitus    NAFLD (nonalcoholic fatty liver disease)    Obstructive sleep apnea syndrome    Obesity (BMI 30-39.9)    Additional Plan Details:    - POCT Glucose  - Encouraged continuation of lifestyle changes including regular exercise and limiting carbohydrates to 30-45 grams per meal threes times daily and 15 grams per snack with a limit of two daily.   - Encouraged continued monitoring of blood glucose with maintenance of 3 times daily at Fasting, Before Lunch and Before Dinner. Eduardo 3 sample placement  through 14 day period.   - Current DM Medication Regimen: Change Humalog 30 units TID wm and 8 units before snack. continue Farxiga 10 mg daily. Continue Metformin 1000 mg BID. Continue Actos 15 mg daily.   - Health Maintenance standards addressed today:  Colorectal Cancer screening - discussed; would like to discuss Fit Kit with PCP .   - Nursing Visit: Patient is below goal range for nursing visit for age group and will not need nursing visit at this time .   - Follow up with me in 2 weeks with A1c prior     Humalog Correction Dosing every 3 hours as needed OUTSIDE OF EATING  If  - 250, may take 4 units of Humalog  If  - 300, may take 8 units of Humalog   If  - 350, may take 12 units of Humalog  If  - 400, may take 16 units of Humalog  If +, may take 20 units of Humalog

## 2023-01-03 ENCOUNTER — OFFICE VISIT (OUTPATIENT)
Dept: DIABETES | Facility: CLINIC | Age: 53
End: 2023-01-03
Payer: MEDICAID

## 2023-01-03 DIAGNOSIS — E78.5 DYSLIPIDEMIA ASSOCIATED WITH TYPE 2 DIABETES MELLITUS: ICD-10-CM

## 2023-01-03 DIAGNOSIS — E11.65 TYPE 2 DIABETES MELLITUS WITH HYPERGLYCEMIA, WITHOUT LONG-TERM CURRENT USE OF INSULIN: ICD-10-CM

## 2023-01-03 DIAGNOSIS — K76.0 NAFLD (NONALCOHOLIC FATTY LIVER DISEASE): ICD-10-CM

## 2023-01-03 DIAGNOSIS — I10 ESSENTIAL HYPERTENSION: ICD-10-CM

## 2023-01-03 DIAGNOSIS — G47.33 OBSTRUCTIVE SLEEP APNEA SYNDROME: ICD-10-CM

## 2023-01-03 DIAGNOSIS — E11.42 DIABETIC POLYNEUROPATHY ASSOCIATED WITH TYPE 2 DIABETES MELLITUS: ICD-10-CM

## 2023-01-03 DIAGNOSIS — E11.69 DYSLIPIDEMIA ASSOCIATED WITH TYPE 2 DIABETES MELLITUS: ICD-10-CM

## 2023-01-03 DIAGNOSIS — Z53.21 PATIENT LEFT WITHOUT BEING SEEN: Primary | ICD-10-CM

## 2023-01-03 DIAGNOSIS — E66.9 OBESITY (BMI 30-39.9): ICD-10-CM

## 2023-01-03 PROCEDURE — 99499 UNLISTED E&M SERVICE: CPT | Mod: 95,,, | Performed by: PHYSICIAN ASSISTANT

## 2023-01-03 PROCEDURE — 99499 NO LOS: ICD-10-PCS | Mod: 95,,, | Performed by: PHYSICIAN ASSISTANT

## 2023-01-04 ENCOUNTER — OFFICE VISIT (OUTPATIENT)
Dept: DIABETES | Facility: CLINIC | Age: 53
End: 2023-01-04
Payer: MEDICAID

## 2023-01-04 DIAGNOSIS — G47.33 OBSTRUCTIVE SLEEP APNEA SYNDROME: ICD-10-CM

## 2023-01-04 DIAGNOSIS — E11.65 TYPE 2 DIABETES MELLITUS WITH HYPERGLYCEMIA, WITHOUT LONG-TERM CURRENT USE OF INSULIN: Primary | ICD-10-CM

## 2023-01-04 DIAGNOSIS — I10 ESSENTIAL HYPERTENSION: ICD-10-CM

## 2023-01-04 DIAGNOSIS — E66.9 OBESITY (BMI 30-39.9): ICD-10-CM

## 2023-01-04 DIAGNOSIS — E78.5 DYSLIPIDEMIA ASSOCIATED WITH TYPE 2 DIABETES MELLITUS: ICD-10-CM

## 2023-01-04 DIAGNOSIS — E11.42 DIABETIC POLYNEUROPATHY ASSOCIATED WITH TYPE 2 DIABETES MELLITUS: ICD-10-CM

## 2023-01-04 DIAGNOSIS — E11.69 DYSLIPIDEMIA ASSOCIATED WITH TYPE 2 DIABETES MELLITUS: ICD-10-CM

## 2023-01-04 DIAGNOSIS — K76.0 NAFLD (NONALCOHOLIC FATTY LIVER DISEASE): ICD-10-CM

## 2023-01-04 DIAGNOSIS — Z53.21 PATIENT LEFT WITHOUT BEING SEEN: ICD-10-CM

## 2023-01-04 PROCEDURE — 99499 UNLISTED E&M SERVICE: CPT | Mod: 95,,, | Performed by: PHYSICIAN ASSISTANT

## 2023-01-04 PROCEDURE — 99499 NO LOS: ICD-10-PCS | Mod: 95,,, | Performed by: PHYSICIAN ASSISTANT

## 2023-02-14 ENCOUNTER — TELEPHONE (OUTPATIENT)
Dept: DIABETES | Facility: CLINIC | Age: 53
End: 2023-02-14
Payer: MEDICAID

## 2023-02-27 ENCOUNTER — OFFICE VISIT (OUTPATIENT)
Dept: DIABETES | Facility: CLINIC | Age: 53
End: 2023-02-27
Payer: MEDICAID

## 2023-02-27 DIAGNOSIS — I10 ESSENTIAL HYPERTENSION: ICD-10-CM

## 2023-02-27 DIAGNOSIS — E11.65 TYPE 2 DIABETES MELLITUS WITH HYPERGLYCEMIA, WITHOUT LONG-TERM CURRENT USE OF INSULIN: Primary | ICD-10-CM

## 2023-02-27 DIAGNOSIS — E11.69 DYSLIPIDEMIA ASSOCIATED WITH TYPE 2 DIABETES MELLITUS: ICD-10-CM

## 2023-02-27 DIAGNOSIS — K76.0 NAFLD (NONALCOHOLIC FATTY LIVER DISEASE): ICD-10-CM

## 2023-02-27 DIAGNOSIS — E78.5 DYSLIPIDEMIA ASSOCIATED WITH TYPE 2 DIABETES MELLITUS: ICD-10-CM

## 2023-02-27 DIAGNOSIS — E11.42 DIABETIC POLYNEUROPATHY ASSOCIATED WITH TYPE 2 DIABETES MELLITUS: ICD-10-CM

## 2023-02-27 DIAGNOSIS — G47.33 OBSTRUCTIVE SLEEP APNEA SYNDROME: ICD-10-CM

## 2023-02-27 DIAGNOSIS — E66.9 OBESITY (BMI 30-39.9): ICD-10-CM

## 2023-02-27 PROCEDURE — 99214 PR OFFICE/OUTPT VISIT, EST, LEVL IV, 30-39 MIN: ICD-10-PCS | Mod: 95,,, | Performed by: PHYSICIAN ASSISTANT

## 2023-02-27 PROCEDURE — 99214 OFFICE O/P EST MOD 30 MIN: CPT | Mod: 95,,, | Performed by: PHYSICIAN ASSISTANT

## 2023-02-27 RX ORDER — DAPAGLIFLOZIN 10 MG/1
10 TABLET, FILM COATED ORAL DAILY
Qty: 90 TABLET | Refills: 3 | Status: SHIPPED | OUTPATIENT
Start: 2023-02-27 | End: 2023-08-01 | Stop reason: SDUPTHER

## 2023-02-27 NOTE — Clinical Note
"3/10 Eduardo 3 education and sample placement - needs LPN / Provider  3 / 10 A1c after NV visit   3/24 call at 2p "Eduardo 3 sample review / call"  "

## 2023-02-27 NOTE — PROGRESS NOTES
PCP: Primary Doctor No    Subjective:     Chief Complaint: Diabetes     TELEMEDICINE VISIT:     The patient location is: Home  The chief complaint leading to consultation is: Diabetes Follow up  Visit type: Virtual visit with synchronous audio and video  Total time spent with patient: 15  Each patient to whom he or she provides medical services by telemedicine is:  (1) informed of the relationship between the physician and patient and the respective role of any other health care provider with respect to management of the patient; and (2) notified that he or she may decline to receive medical services by telemedicine and may withdraw from such care at any time.    Notes: N/A      HISTORY OF PRESENT ILLNESS: 52 y.o.   male presenting for diabetes management.   The patient's last visit with me was on 1/4/2023.  Patient has had Type II diabetes since approximately 10 years ago. .  Pertinent to decision making is the following comorbidities: HTN, HLD, Fatty Liver, Obesity by BMI and SOPHIE   Patient has the following Diabetes complications: with diabetic polyneuropathy  He  has attended diabetes education in the past.     Patient's most recent A1c of 9.0% was completed 4 months ago.   Patient states since His last A1c His blood glucose levels have been high  throughout the day .   Patient monitors blood glucose 3 times per day with meter : Fasting, Before Lunch and Before Dinner.   Patient blood glucose monitoring device will be uploaded into Media Section today.    Patient reports fasting blood sugars have been 130 - 150 and up to 180 after meals with occasional low 200s. Patient had episode after taking insulin where he vomited and BG dropped to 70 so stopped taking insulin, improved diet, and returned to Glimepiride.   Patient endorses the following diabetes related symptoms:  None .  Patient is due today for the following diabetes-related health maintenance standards: A1c and Colorectal Cancer screening .   He  denies recent hospital admissions or emergency room visits.   He denies having hypoglycemia.   Patient's concerns today include glycemic control.  Patient medication regimen is as below.     CURRENT DM MEDICATIONS:   Humalog  - not taking  Metformin 1000 mg BID    Actos - not taking  Farxiga 10 mg daily  Glimepiride 4 mg in am (not timing with 10a meal) and 2 mg before dinner     Patient has failed the following Diabetes medications:   Trulicity /Victoza  - nausea   Toujeo   Humalog   Januvia  Jardiance - blurry vision   Glimepiride      Labs Reviewed.       No results found for: CPEPTIDE  No results found for: GLUTAMICACID       //   , There is no height or weight on file to calculate BMI.  His blood sugar in clinic today is:      Review of Systems   Constitutional:  Negative for activity change, appetite change, chills and fever.   HENT:  Negative for dental problem, mouth sores, nosebleeds, sore throat and trouble swallowing.    Eyes:  Negative for pain and discharge.   Respiratory:  Negative for shortness of breath, wheezing and stridor.    Cardiovascular:  Negative for chest pain, palpitations and leg swelling.   Gastrointestinal:  Negative for abdominal pain, diarrhea, nausea and vomiting.   Endocrine: Negative for polydipsia, polyphagia and polyuria.   Genitourinary:  Negative for dysuria, frequency and urgency.   Musculoskeletal:  Negative for joint swelling and myalgias.   Skin:  Negative for rash and wound.   Neurological:  Negative for dizziness, syncope, weakness and headaches.   Psychiatric/Behavioral:  Negative for behavioral problems and dysphoric mood.        Diabetes Management Status  Statin: Taking  ACE/ARB: Not taking    Screening or Prevention Patient's value Goal Complete/Controlled?   HgA1C Testing and Control   Lab Results   Component Value Date    HGBA1C 9.0 (H) 10/12/2022      Annually/Less than 8% Yes   Lipid profile : 10/12/2022 Annually No   LDL control Lab Results   Component Value Date     LDLCALC 76.0 10/12/2022    Annually/Less than 100 mg/dl  No   Nephropathy screening Lab Results   Component Value Date    MICALBCREAT 25.0 10/12/2022     No results found for: PROTEINUA Annually No   Blood pressure BP Readings from Last 1 Encounters:   12/19/22 134/87    Less than 140/90 No   Dilated retinal exam : 06/16/2022 Annually No    Foot exam   : 12/19/2022 Annually Yes     Social History     Socioeconomic History    Marital status:    Tobacco Use    Smoking status: Former   Substance and Sexual Activity    Alcohol use: Never    Drug use: Never    Sexual activity: Yes     Partners: Female     Past Medical History:   Diagnosis Date    Arthritis     Chronic bilateral low back pain without sciatica 7/27/2020    X-ray Lumbar Spine December, 2019: Dextroscoliosis of the lumbar spine is noted.  There is minimal grade 1 retrolisthesis of L2 on L3 and L3 on L4.  No fracture is seen.  Degenerative changes are noted with spurring of the endplates and lower lumbar facet arthropathy.  Mild disc space narrowing at L2-L3.    Class 2 severe obesity due to excess calories with serious comorbidity and body mass index (BMI) of 38.0 to 38.9 in adult 9/22/2020    Diabetes mellitus, type 2     Essential hypertension 1/21/2020    Hepatosplenomegaly 10/7/2020    US ABDOMEN LIMITED 09/30/2020 FINDINGS: Liver: Increased in size, measuring 21.3 cm. Homogeneous increased echotexture. No focal hepatic lesions. Gallbladder: No calculi, wall thickening, or pericholecystic fluid. No sonographic Hdez's sign. Biliary system: The common duct is not dilated, measuring 5 mm. No intrahepatic ductal dilatation. Spleen: Increased in size and echotexture, measuring 15.9    Hyperlipidemia     Hypertension     Lesion of spleen 10/7/2020    US ABDOMEN LIMITED 09/30/2020 Spleen: Increased in size and echotexture, measuring 15.9 cm. There is heterogeneous area in the spleen which measures 3 x 2.2 x 2.1 cm with more central area of  isoechogenicity with peripheral hypoechoic region. IMPRESSION: ...Indeterminate 3 cm heterogeneous mass in the spleen, potentially a hemangioma although other etiologies possible as well. Consider attention o    NAFLD (nonalcoholic fatty liver disease) 10/7/2020    US ABDOMEN LIMITED 09/30/2020 FINDINGS: Liver: Increased in size, measuring 21.3 cm. Homogeneous increased echotexture. No focal hepatic lesions. Gallbladder: No calculi, wall thickening, or pericholecystic fluid. No sonographic Hdez's sign. Biliary system: The common duct is not dilated, measuring 5 mm. No intrahepatic ductal dilatation. Spleen: Increased in size and echotexture, measuring 15.9    Obstructive sleep apnea syndrome 7/27/2020    Spondylosis of lumbar region without myelopathy or radiculopathy 7/27/2020    Type 2 diabetes mellitus with diabetic polyneuropathy, without long-term current use of insulin 7/27/2020    Type 2 diabetes mellitus with other specified complication 4/21/2020    Uncontrolled type 2 diabetes mellitus with hyperglycemia 4/21/2020       Objective:      Physical Exam  Constitutional:       General: He is not in acute distress.     Appearance: He is well-developed. He is not diaphoretic.   HENT:      Head: Normocephalic and atraumatic.      Right Ear: External ear normal.      Left Ear: External ear normal.      Nose: Nose normal.   Eyes:      General:         Right eye: No discharge.         Left eye: No discharge.   Pulmonary:      Effort: Pulmonary effort is normal. No respiratory distress.      Breath sounds: No stridor.   Musculoskeletal:         General: Normal range of motion.      Cervical back: Normal range of motion.   Skin:     Coloration: Skin is not pale.   Neurological:      Mental Status: He is alert and oriented to person, place, and time. Mental status is at baseline.      Motor: No abnormal muscle tone.      Coordination: Coordination normal.   Psychiatric:         Mood and Affect: Mood normal.          Behavior: Behavior normal.         Thought Content: Thought content normal.         Judgment: Judgment normal.         Assessment / Plan:     Type 2 diabetes mellitus with hyperglycemia, without long-term current use of insulin  -     dapagliflozin (FARXIGA) 10 mg tablet; Take 1 tablet (10 mg total) by mouth once daily.  Dispense: 90 tablet; Refill: 3  -     Hemoglobin A1C; Standing  -     GLUCOSE MONITORING CONTINUOUS MIN 72 HOURS; Future    Diabetic polyneuropathy associated with type 2 diabetes mellitus    Essential hypertension    Dyslipidemia associated with type 2 diabetes mellitus    NAFLD (nonalcoholic fatty liver disease)    Obstructive sleep apnea syndrome    Obesity (BMI 30-39.9)      Additional Plan Details:    - POCT Glucose  - Encouraged continuation of lifestyle changes including regular exercise and limiting carbohydrates to 30-45 grams per meal threes times daily and 15 grams per snack with a limit of two daily.   - Encouraged continued monitoring of blood glucose with maintenance of 3 times daily at Fasting, Before Lunch and Before Dinner.   - Current DM Medication Regimen: continue Farxiga 10 mg daily. Continue Metformin 1000 mg BID. Change Glimepiride to 4 mg before breakfast (take 15 mins before) and 2 mg before dinner. Continue diet and exercise change. Will evaluate with Painting With A Twist 3 sample placement. Order entered today - will schedule NV for placement.   - Health Maintenance standards addressed today:  Colorectal Cancer screening - discussed; would like to discuss Fit Kit with PCP .   - Nursing Visit: Patient is below goal range for nursing visit for age group and will not need nursing visit at this time .   - Follow up with me in 4 weeks with A1c prior.     Humalog Correction Dosing every 3 hours as needed OUTSIDE OF EATING  If  - 250, may take 4 units of Humalog  If  - 300, may take 8 units of Humalog   If  - 350, may take 12 units of Humalog  If  - 400, may take 16  units of Humalog  If +, may take 20 units of Humalog

## 2023-03-08 ENCOUNTER — LAB VISIT (OUTPATIENT)
Dept: LAB | Facility: HOSPITAL | Age: 53
End: 2023-03-08
Attending: PHYSICIAN ASSISTANT
Payer: MEDICAID

## 2023-03-08 DIAGNOSIS — E11.65 TYPE 2 DIABETES MELLITUS WITH HYPERGLYCEMIA, WITHOUT LONG-TERM CURRENT USE OF INSULIN: ICD-10-CM

## 2023-03-08 LAB
ESTIMATED AVG GLUCOSE: 183 MG/DL (ref 68–131)
HBA1C MFR BLD: 8 % (ref 4–5.6)

## 2023-03-08 PROCEDURE — 36415 COLL VENOUS BLD VENIPUNCTURE: CPT | Performed by: PHYSICIAN ASSISTANT

## 2023-03-08 PROCEDURE — 83036 HEMOGLOBIN GLYCOSYLATED A1C: CPT | Performed by: PHYSICIAN ASSISTANT

## 2023-03-09 ENCOUNTER — TELEPHONE (OUTPATIENT)
Dept: DIABETES | Facility: CLINIC | Age: 53
End: 2023-03-09
Payer: MEDICAID

## 2023-03-09 NOTE — TELEPHONE ENCOUNTER
----- Message from Glenda Miranda RD, CDE sent at 3/9/2023  2:48 PM CST -----  Contact: Patient    ----- Message -----  From: Ishan Hedrick  Sent: 3/9/2023   1:32 PM CST  To: , Mj Chenshantanu JULIAN Amarjit would like a call back at [phone], in regards to rescheduling his nurse visit for a later time if possible.

## 2023-03-10 ENCOUNTER — CLINICAL SUPPORT (OUTPATIENT)
Dept: DIABETES | Facility: CLINIC | Age: 53
End: 2023-03-10
Payer: MEDICAID

## 2023-03-10 DIAGNOSIS — E11.65 TYPE 2 DIABETES MELLITUS WITH HYPERGLYCEMIA, WITHOUT LONG-TERM CURRENT USE OF INSULIN: Primary | ICD-10-CM

## 2023-03-10 NOTE — PROGRESS NOTES
"FREESTYLE DAMASO 3 SAMPLE CGM TRAINING:    Freestyle Damaso 3 Sample CGM Kit provided today in clinic. Sensor was placed on pts left arm today.  Lot# [id number]  SN [id number]  Exp 2023-04-30    Freestyle Damaso 3 mobile deanna downloaded to phone. Education was provided using "Quick Start Guide" per Freestyle Damaso 3 protocol.    Alert Settings Set:    · High Alert 250    · Low Alert 70    Instructed patient on following:     - Remove the Sensor before MRI, CT scan, X-ray, or diathermy treatment.    - Patient advised to always check glucose with glucometer should readings do not match symptoms felt (ex. Hypo or hyperglycemia).    Patient instructed on proper and safe removal of CGM sample on Day 14 of wear or sooner once session has completed.    Patient will be scheduled for appt for remote data download and review following a 72 hour - 14 day period.                      "

## 2023-03-24 ENCOUNTER — OFFICE VISIT (OUTPATIENT)
Dept: DIABETES | Facility: CLINIC | Age: 53
End: 2023-03-24
Payer: MEDICAID

## 2023-03-24 DIAGNOSIS — E78.5 DYSLIPIDEMIA ASSOCIATED WITH TYPE 2 DIABETES MELLITUS: ICD-10-CM

## 2023-03-24 DIAGNOSIS — E66.9 OBESITY (BMI 30-39.9): ICD-10-CM

## 2023-03-24 DIAGNOSIS — E11.65 TYPE 2 DIABETES MELLITUS WITH HYPERGLYCEMIA, WITHOUT LONG-TERM CURRENT USE OF INSULIN: Primary | ICD-10-CM

## 2023-03-24 DIAGNOSIS — I10 ESSENTIAL HYPERTENSION: ICD-10-CM

## 2023-03-24 DIAGNOSIS — E11.42 DIABETIC POLYNEUROPATHY ASSOCIATED WITH TYPE 2 DIABETES MELLITUS: ICD-10-CM

## 2023-03-24 DIAGNOSIS — G47.33 OBSTRUCTIVE SLEEP APNEA SYNDROME: ICD-10-CM

## 2023-03-24 DIAGNOSIS — K76.0 NAFLD (NONALCOHOLIC FATTY LIVER DISEASE): ICD-10-CM

## 2023-03-24 DIAGNOSIS — E11.69 DYSLIPIDEMIA ASSOCIATED WITH TYPE 2 DIABETES MELLITUS: ICD-10-CM

## 2023-03-24 PROCEDURE — 99212 OFFICE O/P EST SF 10 MIN: CPT | Mod: 95,,, | Performed by: PHYSICIAN ASSISTANT

## 2023-03-24 PROCEDURE — 3052F PR MOST RECENT HEMOGLOBIN A1C LEVEL 8.0 - < 9.0%: ICD-10-PCS | Mod: CPTII,95,, | Performed by: PHYSICIAN ASSISTANT

## 2023-03-24 PROCEDURE — 99212 PR OFFICE/OUTPT VISIT, EST, LEVL II, 10-19 MIN: ICD-10-PCS | Mod: 95,,, | Performed by: PHYSICIAN ASSISTANT

## 2023-03-24 PROCEDURE — 3052F HG A1C>EQUAL 8.0%<EQUAL 9.0%: CPT | Mod: CPTII,95,, | Performed by: PHYSICIAN ASSISTANT

## 2023-03-24 RX ORDER — SEMAGLUTIDE 1.34 MG/ML
INJECTION, SOLUTION SUBCUTANEOUS
Qty: 1 EACH | Refills: 11 | Status: SHIPPED | OUTPATIENT
Start: 2023-03-24 | End: 2023-05-03 | Stop reason: SDUPTHER

## 2023-03-24 NOTE — PROGRESS NOTES
PCP: Primary Doctor No    Subjective:     Chief Complaint: Diabetes     Established Patient - Audio Only Telehealth Visit     The patient location is: Home  The chief complaint leading to consultation is: Diabetes follow up  Visit type: Virtual visit with audio only (telephone)  Total Time Spent with Patient: 7 minutes     The reason for the audio only service rather than synchronous audio and video virtual visit was related to technical difficulties or patient preference/necessity.     Each patient to whom I provide medical services by telemedicine is:  (1) informed of the relationship between the physician and patient and the respective role of any other health care provider with respect to management of the patient; and (2) notified that they may decline to receive medical services by telemedicine and may withdraw from such care at any time. Patient verbally consented to receive this service via voice-only telephone call.    This service was not originating from a related E/M service provided within the previous 7 days nor will  to an E/M service or procedure within the next 24 hours or my soonest available appointment.  Prevailing standard of care was able to be met in this audio-only visit.      HISTORY OF PRESENT ILLNESS: 52 y.o.   male presenting for diabetes management.   The patient's last visit with me was on 2/27/2023.  Patient has had Type II diabetes since approximately 10 years ago. .  Pertinent to decision making is the following comorbidities: HTN, HLD, Fatty Liver, Obesity by BMI and SOPHIE   Patient has the following Diabetes complications: with diabetic polyneuropathy  He  has attended diabetes education in the past.     Patient's most recent A1c of 8.0% was completed 2 weeks ago.   Patient states since His last A1c His blood glucose levels have been high  throughout the day .   Patient monitors blood glucose 3 times per day with meter : Fasting, Before Lunch and Before Dinner. Eduardo  3 Sample CGM was placed on 03/10/23. This was download today and will be reviewed.   Patient blood glucose monitoring device will be uploaded into Media Section today.      Patient reports show postprandial hyperglycemia after meals leading to hyperglycemia throughout the day.  Patient endorses the following diabetes related symptoms:  None .  Patient is due today for the following diabetes-related health maintenance standards:  Colorectal Cancer screening .   He denies recent hospital admissions or emergency room visits.   He denies having hypoglycemia.     GLP-1 Therapy Initiation Considerations:   Family history of pancreatic cancer in first-degree relative: no  Personal history of pancreatitis: no  Family history of MTC/MEN/endocrine tumors: no  Personal history of Gastroparesis: no  Past use of GLP-1 Therapy with adverse effects: yes - Trulicity   Past Bariatric Surgery: no  Personal history of Diabetic Retinopathy: no  Most Recent GFR: > 60 completed on Oct 2022  Need for Cardiovascular Risk Reduction: yes  Need for Potential Weight Loss: yes      Patient's concerns today include glycemic control.    CURRENT DM MEDICATIONS:   Humalog  - not taking  Metformin 1000 mg BID    Actos - not taking  Farxiga 10 mg daily  Glimepiride 4 mg before breakfast and 2 mg before dinner     Patient has failed the following Diabetes medications:   Trulicity / Victoza  - nausea   Toujeo   Humalog   Januvia  Jardiance - blurry vision   Glimepiride      Labs Reviewed.       No results found for: CPEPTIDE  No results found for: GLUTAMICACID       //   , There is no height or weight on file to calculate BMI.  His blood sugar in clinic today is:      Review of Systems   Constitutional:  Negative for activity change, appetite change, chills and fever.   HENT:  Negative for dental problem, mouth sores, nosebleeds, sore throat and trouble swallowing.    Eyes:  Negative for pain and discharge.   Respiratory:  Negative for shortness of  breath, wheezing and stridor.    Cardiovascular:  Negative for chest pain, palpitations and leg swelling.   Gastrointestinal:  Negative for abdominal pain, diarrhea, nausea and vomiting.   Endocrine: Negative for polydipsia, polyphagia and polyuria.   Genitourinary:  Negative for dysuria, frequency and urgency.   Musculoskeletal:  Negative for joint swelling and myalgias.   Skin:  Negative for rash and wound.   Neurological:  Negative for dizziness, syncope, weakness and headaches.   Psychiatric/Behavioral:  Negative for behavioral problems and dysphoric mood.        Diabetes Management Status  Statin: Taking  ACE/ARB: Not taking    Screening or Prevention Patient's value Goal Complete/Controlled?   HgA1C Testing and Control   Lab Results   Component Value Date    HGBA1C 8.0 (H) 03/08/2023      Annually/Less than 8% Yes   Lipid profile : 10/12/2022 Annually No   LDL control Lab Results   Component Value Date    LDLCALC 76.0 10/12/2022    Annually/Less than 100 mg/dl  No   Nephropathy screening Lab Results   Component Value Date    MICALBCREAT 25.0 10/12/2022     No results found for: PROTEINUA Annually No   Blood pressure BP Readings from Last 1 Encounters:   12/19/22 134/87    Less than 140/90 No   Dilated retinal exam : 06/16/2022 Annually No    Foot exam   : 12/19/2022 Annually Yes     Social History     Socioeconomic History    Marital status:    Tobacco Use    Smoking status: Former   Substance and Sexual Activity    Alcohol use: Never    Drug use: Never    Sexual activity: Yes     Partners: Female     Past Medical History:   Diagnosis Date    Arthritis     Chronic bilateral low back pain without sciatica 7/27/2020    X-ray Lumbar Spine December, 2019: Dextroscoliosis of the lumbar spine is noted.  There is minimal grade 1 retrolisthesis of L2 on L3 and L3 on L4.  No fracture is seen.  Degenerative changes are noted with spurring of the endplates and lower lumbar facet arthropathy.  Mild disc space  narrowing at L2-L3.    Class 2 severe obesity due to excess calories with serious comorbidity and body mass index (BMI) of 38.0 to 38.9 in adult 9/22/2020    Diabetes mellitus, type 2     Essential hypertension 1/21/2020    Hepatosplenomegaly 10/7/2020    US ABDOMEN LIMITED 09/30/2020 FINDINGS: Liver: Increased in size, measuring 21.3 cm. Homogeneous increased echotexture. No focal hepatic lesions. Gallbladder: No calculi, wall thickening, or pericholecystic fluid. No sonographic Hdez's sign. Biliary system: The common duct is not dilated, measuring 5 mm. No intrahepatic ductal dilatation. Spleen: Increased in size and echotexture, measuring 15.9    Hyperlipidemia     Hypertension     Lesion of spleen 10/7/2020    US ABDOMEN LIMITED 09/30/2020 Spleen: Increased in size and echotexture, measuring 15.9 cm. There is heterogeneous area in the spleen which measures 3 x 2.2 x 2.1 cm with more central area of isoechogenicity with peripheral hypoechoic region. IMPRESSION: ...Indeterminate 3 cm heterogeneous mass in the spleen, potentially a hemangioma although other etiologies possible as well. Consider attention o    NAFLD (nonalcoholic fatty liver disease) 10/7/2020    US ABDOMEN LIMITED 09/30/2020 FINDINGS: Liver: Increased in size, measuring 21.3 cm. Homogeneous increased echotexture. No focal hepatic lesions. Gallbladder: No calculi, wall thickening, or pericholecystic fluid. No sonographic Hdez's sign. Biliary system: The common duct is not dilated, measuring 5 mm. No intrahepatic ductal dilatation. Spleen: Increased in size and echotexture, measuring 15.9    Obstructive sleep apnea syndrome 7/27/2020    Spondylosis of lumbar region without myelopathy or radiculopathy 7/27/2020    Type 2 diabetes mellitus with diabetic polyneuropathy, without long-term current use of insulin 7/27/2020    Type 2 diabetes mellitus with other specified complication 4/21/2020    Uncontrolled type 2 diabetes mellitus with hyperglycemia  4/21/2020       Objective:      Physical Exam  Neurological:      Mental Status: He is alert and oriented to person, place, and time. Mental status is at baseline.   Psychiatric:         Mood and Affect: Mood normal.         Behavior: Behavior normal.         Thought Content: Thought content normal.         Judgment: Judgment normal.         Assessment / Plan:     Type 2 diabetes mellitus with hyperglycemia, without long-term current use of insulin  -     semaglutide (OZEMPIC) 0.25 mg or 0.5 mg(2 mg/1.5 mL) pen injector; Inject 0.25 mg into the skin every 7 days for 28 days, THEN 0.5 mg every 7 days.  Dispense: 1 each; Refill: 11    Diabetic polyneuropathy associated with type 2 diabetes mellitus    Essential hypertension    Dyslipidemia associated with type 2 diabetes mellitus    NAFLD (nonalcoholic fatty liver disease)    Obstructive sleep apnea syndrome    Obesity (BMI 30-39.9)      Additional Plan Details:    - POCT Glucose  - Encouraged continuation of lifestyle changes including regular exercise and limiting carbohydrates to 30-45 grams per meal threes times daily and 15 grams per snack with a limit of two daily.   - Encouraged continued monitoring of blood glucose with maintenance of 3 times daily at Fasting, Before Lunch and Before Dinner. Edaurdo 3 sample reviewed today.   - Current DM Medication Regimen: continue Farxiga 10 mg daily. Continue Metformin 1000 mg BID. Change Glimepiride to 4 mg before breakfast (take 15 mins before) and 4 mg before dinner. Continue Humalog correction dosing every 3 hours. Start Ozempic 0.25 mg weekly - pen training video sent.   - Health Maintenance standards addressed today:  Colorectal Cancer screening - discussed; would like to discuss Fit Kit with PCP .   - Nursing Visit: Patient is below goal range for nursing visit for age group and will not need nursing visit at this time .   - Follow up with me in 4 weeks with A1c prior.     Humalog Correction Dosing every 3 hours as  needed OUTSIDE OF EATING  If  - 250, may take 4 units of Humalog  If  - 300, may take 8 units of Humalog   If  - 350, may take 12 units of Humalog  If  - 400, may take 16 units of Humalog  If +, may take 20 units of Humalog

## 2023-03-24 NOTE — PATIENT INSTRUCTIONS
CURRENT DM MEDICATIONS:   Humalog correction dosing every 3 hours as needed   Metformin 1000 mg BID    Farxiga 10 mg daily  Glimepiride 4 mg before breakfast and 4 mg before dinner   Ozempic 0.25 mg weekly    Ozempic start:  Start taking Ozempic 0.25 mg once weekly for 4 weeks then increase to 0.5 mg once weekly for at least 4 weeks    Depending on your glucose we will consider the need to increase to 1 mg once weekly.     Potential Side Effects of Ozempic:   One of the ways this medication works is by decreasing how fast your stomach empties, which makes you feel full faster after you eat and should help you lose weight. The main side-effects are related to GI upset, such as nausea, bloating and abdominal cramps. You can usually avoid this by eating slowly (take half of your normal portion and eat it over 30 minutes). If you do experience nausea, it does tend to get better after the first few weeks. The most severe reaction is acute pancreatitis which can cause severe abdominal pain radiating to your back. If you experience this, stop the medication and go to the ER. Fortunately this is very rare.    Ozempic Injection Technique Video:   [email or web address]-[email or web address]-[email or web address]-[email or web address]

## 2023-05-03 ENCOUNTER — PATIENT MESSAGE (OUTPATIENT)
Dept: DIABETES | Facility: CLINIC | Age: 53
End: 2023-05-03

## 2023-05-03 ENCOUNTER — OFFICE VISIT (OUTPATIENT)
Dept: DIABETES | Facility: CLINIC | Age: 53
End: 2023-05-03
Payer: MEDICAID

## 2023-05-03 DIAGNOSIS — G47.33 OBSTRUCTIVE SLEEP APNEA SYNDROME: ICD-10-CM

## 2023-05-03 DIAGNOSIS — E78.5 DYSLIPIDEMIA ASSOCIATED WITH TYPE 2 DIABETES MELLITUS: ICD-10-CM

## 2023-05-03 DIAGNOSIS — E11.69 DYSLIPIDEMIA ASSOCIATED WITH TYPE 2 DIABETES MELLITUS: ICD-10-CM

## 2023-05-03 DIAGNOSIS — E11.42 DIABETIC POLYNEUROPATHY ASSOCIATED WITH TYPE 2 DIABETES MELLITUS: ICD-10-CM

## 2023-05-03 DIAGNOSIS — E11.65 TYPE 2 DIABETES MELLITUS WITH HYPERGLYCEMIA, WITHOUT LONG-TERM CURRENT USE OF INSULIN: Primary | ICD-10-CM

## 2023-05-03 DIAGNOSIS — I10 ESSENTIAL HYPERTENSION: ICD-10-CM

## 2023-05-03 DIAGNOSIS — E66.9 OBESITY (BMI 30-39.9): ICD-10-CM

## 2023-05-03 DIAGNOSIS — R35.1 NOCTURIA: ICD-10-CM

## 2023-05-03 DIAGNOSIS — R53.83 FATIGUE, UNSPECIFIED TYPE: ICD-10-CM

## 2023-05-03 DIAGNOSIS — K76.0 NAFLD (NONALCOHOLIC FATTY LIVER DISEASE): ICD-10-CM

## 2023-05-03 PROCEDURE — 3052F PR MOST RECENT HEMOGLOBIN A1C LEVEL 8.0 - < 9.0%: ICD-10-PCS | Mod: CPTII,95,, | Performed by: PHYSICIAN ASSISTANT

## 2023-05-03 PROCEDURE — 3052F HG A1C>EQUAL 8.0%<EQUAL 9.0%: CPT | Mod: CPTII,95,, | Performed by: PHYSICIAN ASSISTANT

## 2023-05-03 PROCEDURE — 99212 PR OFFICE/OUTPT VISIT, EST, LEVL II, 10-19 MIN: ICD-10-PCS | Mod: 95,,, | Performed by: PHYSICIAN ASSISTANT

## 2023-05-03 PROCEDURE — 99212 OFFICE O/P EST SF 10 MIN: CPT | Mod: 95,,, | Performed by: PHYSICIAN ASSISTANT

## 2023-05-03 RX ORDER — GLIMEPIRIDE 4 MG/1
4 TABLET ORAL
Qty: 180 TABLET | Refills: 1 | Status: SHIPPED | OUTPATIENT
Start: 2023-05-03 | End: 2023-08-01 | Stop reason: SDUPTHER

## 2023-05-03 RX ORDER — SEMAGLUTIDE 1.34 MG/ML
0.5 INJECTION, SOLUTION SUBCUTANEOUS
Qty: 1 EACH | Refills: 11 | Status: SHIPPED | OUTPATIENT
Start: 2023-05-03 | End: 2023-05-31

## 2023-05-03 NOTE — PATIENT INSTRUCTIONS
CURRENT DM MEDICATIONS:   Humalog correction dosing every 3 hours as below  Metformin 1000 mg twice a day     Farxiga 10 mg daily  Glimepiride 4 mg before breakfast and 4 mg before dinner   Ozempic 0.5 mg weekly      Humalog Correction Dosing every 3 hours as needed OUTSIDE OF EATING  If  - 250, may take 4 units of Humalog  If  - 300, may take 8 units of Humalog   If  - 350, may take 12 units of Humalog  If  - 400, may take 16 units of Humalog  If +, may take 20 units of Humalog

## 2023-05-03 NOTE — PROGRESS NOTES
PCP: Primary Doctor No    Subjective:     Chief Complaint: Diabetes     Established Patient - Audio Only Telehealth Visit     The patient location is: Home  The chief complaint leading to consultation is: Diabetes follow up  Visit type: Virtual visit with audio only (telephone)  Total Time Spent with Patient: 8 minutes     The reason for the audio only service rather than synchronous audio and video virtual visit was related to technical difficulties or patient preference/necessity.     Each patient to whom I provide medical services by telemedicine is:  (1) informed of the relationship between the physician and patient and the respective role of any other health care provider with respect to management of the patient; and (2) notified that they may decline to receive medical services by telemedicine and may withdraw from such care at any time. Patient verbally consented to receive this service via voice-only telephone call.    This service was not originating from a related E/M service provided within the previous 7 days nor will  to an E/M service or procedure within the next 24 hours or my soonest available appointment.  Prevailing standard of care was able to be met in this audio-only visit.      HISTORY OF PRESENT ILLNESS: 52 y.o.   male presenting for diabetes management.   The patient's last visit with me was on 3/24/2023.  Patient has had Type II diabetes since approximately 10 years ago. .  Pertinent to decision making is the following comorbidities: HTN, HLD, Fatty Liver, Obesity by BMI and SOPHIE   Patient has the following Diabetes complications: with diabetic polyneuropathy  He  has attended diabetes education in the past.     Patient's most recent A1c of 8.0% was completed 2 weeks ago.   Patient states since His last A1c His blood glucose levels have been high  throughout the day .   Patient monitors blood glucose 3 times per day with meter : Fasting, Before Lunch and Before  Dinner.  Patient blood glucose monitoring device will be uploaded into Media Section today.    Patient reports fasting 140 and up to 220 after meals.   Patient endorses the following diabetes related symptoms:  Nocturia and fatigue .  Patient is due today for the following diabetes-related health maintenance standards:  Colorectal Cancer screening .   He denies recent hospital admissions or emergency room visits.   He denies having hypoglycemia.   Patient's concerns today include glycemic control.    CURRENT DM MEDICATIONS:   Humalog correction dosing every 3 hours as below  Metformin 1000 mg BID    Farxiga 10 mg daily  Glimepiride 4 mg before breakfast and 4 mg before dinner   Ozempic 0.25 mg weekly      Humalog Correction Dosing every 3 hours as needed OUTSIDE OF EATING  If  - 250, may take 4 units of Humalog  If  - 300, may take 8 units of Humalog   If  - 350, may take 12 units of Humalog  If  - 400, may take 16 units of Humalog  If +, may take 20 units of Humalog    Patient has failed the following Diabetes medications:   Trulicity / Victoza  - nausea   Toujeo   Humalog   Januvia  Jardiance - blurry vision   Glimepiride      Labs Reviewed.       No results found for: CPEPTIDE  No results found for: GLUTAMICACID       //   , There is no height or weight on file to calculate BMI.  His blood sugar in clinic today is:      Review of Systems   Constitutional:  Negative for activity change, appetite change, chills and fever.   HENT:  Negative for dental problem, mouth sores, nosebleeds, sore throat and trouble swallowing.    Eyes:  Negative for pain and discharge.   Respiratory:  Negative for shortness of breath, wheezing and stridor.    Cardiovascular:  Negative for chest pain, palpitations and leg swelling.   Gastrointestinal:  Negative for abdominal pain, diarrhea, nausea and vomiting.   Endocrine: Negative for polydipsia, polyphagia and polyuria.   Genitourinary:  Negative for  dysuria, frequency and urgency.   Musculoskeletal:  Negative for joint swelling and myalgias.   Skin:  Negative for rash and wound.   Neurological:  Negative for dizziness, syncope, weakness and headaches.   Psychiatric/Behavioral:  Negative for behavioral problems and dysphoric mood.        Diabetes Management Status  Statin: Taking  ACE/ARB: Not taking    Screening or Prevention Patient's value Goal Complete/Controlled?   HgA1C Testing and Control   Lab Results   Component Value Date    HGBA1C 8.0 (H) 03/08/2023      Annually/Less than 8% Yes   Lipid profile : 10/12/2022 Annually No   LDL control Lab Results   Component Value Date    LDLCALC 76.0 10/12/2022    Annually/Less than 100 mg/dl  No   Nephropathy screening Lab Results   Component Value Date    MICALBCREAT 25.0 10/12/2022     No results found for: PROTEINUA Annually No   Blood pressure BP Readings from Last 1 Encounters:   12/19/22 134/87    Less than 140/90 No   Dilated retinal exam : 06/16/2022 Annually No    Foot exam   : 12/19/2022 Annually Yes     Social History     Socioeconomic History    Marital status:    Tobacco Use    Smoking status: Former   Substance and Sexual Activity    Alcohol use: Never    Drug use: Never    Sexual activity: Yes     Partners: Female     Past Medical History:   Diagnosis Date    Arthritis     Chronic bilateral low back pain without sciatica 7/27/2020    X-ray Lumbar Spine December, 2019: Dextroscoliosis of the lumbar spine is noted.  There is minimal grade 1 retrolisthesis of L2 on L3 and L3 on L4.  No fracture is seen.  Degenerative changes are noted with spurring of the endplates and lower lumbar facet arthropathy.  Mild disc space narrowing at L2-L3.    Class 2 severe obesity due to excess calories with serious comorbidity and body mass index (BMI) of 38.0 to 38.9 in adult 9/22/2020    Diabetes mellitus, type 2     Essential hypertension 1/21/2020    Hepatosplenomegaly 10/7/2020    US ABDOMEN LIMITED 09/30/2020  FINDINGS: Liver: Increased in size, measuring 21.3 cm. Homogeneous increased echotexture. No focal hepatic lesions. Gallbladder: No calculi, wall thickening, or pericholecystic fluid. No sonographic Hdez's sign. Biliary system: The common duct is not dilated, measuring 5 mm. No intrahepatic ductal dilatation. Spleen: Increased in size and echotexture, measuring 15.9    Hyperlipidemia     Hypertension     Lesion of spleen 10/7/2020    US ABDOMEN LIMITED 09/30/2020 Spleen: Increased in size and echotexture, measuring 15.9 cm. There is heterogeneous area in the spleen which measures 3 x 2.2 x 2.1 cm with more central area of isoechogenicity with peripheral hypoechoic region. IMPRESSION: ...Indeterminate 3 cm heterogeneous mass in the spleen, potentially a hemangioma although other etiologies possible as well. Consider attention o    NAFLD (nonalcoholic fatty liver disease) 10/7/2020    US ABDOMEN LIMITED 09/30/2020 FINDINGS: Liver: Increased in size, measuring 21.3 cm. Homogeneous increased echotexture. No focal hepatic lesions. Gallbladder: No calculi, wall thickening, or pericholecystic fluid. No sonographic Hdez's sign. Biliary system: The common duct is not dilated, measuring 5 mm. No intrahepatic ductal dilatation. Spleen: Increased in size and echotexture, measuring 15.9    Obstructive sleep apnea syndrome 7/27/2020    Spondylosis of lumbar region without myelopathy or radiculopathy 7/27/2020    Type 2 diabetes mellitus with diabetic polyneuropathy, without long-term current use of insulin 7/27/2020    Type 2 diabetes mellitus with other specified complication 4/21/2020    Uncontrolled type 2 diabetes mellitus with hyperglycemia 4/21/2020       Objective:      Physical Exam  Neurological:      Mental Status: He is alert and oriented to person, place, and time. Mental status is at baseline.   Psychiatric:         Mood and Affect: Mood normal.         Behavior: Behavior normal.         Thought Content: Thought  content normal.         Judgment: Judgment normal.         Assessment / Plan:     Type 2 diabetes mellitus with hyperglycemia, without long-term current use of insulin  -     Vitamin D; Future; Expected date: 05/03/2023  -     TSH; Future; Expected date: 05/03/2023  -     T4, Free; Future; Expected date: 05/03/2023  -     semaglutide (OZEMPIC) 0.25 mg or 0.5 mg(2 mg/1.5 mL) pen injector; Inject 0.5 mg into the skin every 7 days.  Dispense: 1 each; Refill: 11  -     glimepiride (AMARYL) 4 MG tablet; Take 1 tablet (4 mg total) by mouth 2 (two) times daily before meals.  Dispense: 180 tablet; Refill: 1  -     Vitamin B12; Future; Expected date: 05/03/2023  -     Hemoglobin A1C; Standing  -     Comprehensive Metabolic Panel; Future; Expected date: 05/03/2023    Diabetic polyneuropathy associated with type 2 diabetes mellitus    Essential hypertension    Nocturia  -     PSA, SCREENING; Future; Expected date: 05/03/2023  -     Urinalysis, Reflex to Urine Culture Urine, Clean Catch    Fatigue, unspecified type  -     Vitamin D; Future; Expected date: 05/03/2023  -     TSH; Future; Expected date: 05/03/2023  -     T4, Free; Future; Expected date: 05/03/2023  -     Vitamin B12; Future; Expected date: 05/03/2023  -     Comprehensive Metabolic Panel; Future; Expected date: 05/03/2023    Dyslipidemia associated with type 2 diabetes mellitus    NAFLD (nonalcoholic fatty liver disease)    Obstructive sleep apnea syndrome    Obesity (BMI 30-39.9)  -     Vitamin D; Future; Expected date: 05/03/2023      Additional Plan Details:    - POCT Glucose  - Encouraged continuation of lifestyle changes including regular exercise and limiting carbohydrates to 30-45 grams per meal threes times daily and 15 grams per snack with a limit of two daily.   - Encouraged continued monitoring of blood glucose with maintenance of 3 times daily at Fasting, Before Lunch and Before Dinner.   - Current DM Medication Regimen: continue Farxiga 10 mg daily.  Continue Metformin 1000 mg BID. Continue Glimepiride to 4 mg before breakfast (take 15 mins before) and 4 mg before dinner. Continue Humalog correction dosing every 3 hours. Change Ozempic 0.5 mg weekly.  - Nonfasting labs to eval nocturia and fatigue  - Health Maintenance standards addressed today:  Colorectal Cancer screening - discussed; would like to discuss Fit Kit with PCP .   - Nursing Visit: Patient is below goal range for nursing visit for age group and will not need nursing visit at this time .   - Follow up with me in 6 weeks with A1c prior.     Humalog Correction Dosing every 3 hours as needed OUTSIDE OF EATING  If  - 250, may take 4 units of Humalog  If  - 300, may take 8 units of Humalog   If  - 350, may take 12 units of Humalog  If  - 400, may take 16 units of Humalog  If +, may take 20 units of Humalog    Wang Pringle PA-C  Diabetes Management

## 2023-06-14 ENCOUNTER — OFFICE VISIT (OUTPATIENT)
Dept: DIABETES | Facility: CLINIC | Age: 53
End: 2023-06-14
Payer: MEDICAID

## 2023-06-14 DIAGNOSIS — G47.33 OBSTRUCTIVE SLEEP APNEA SYNDROME: ICD-10-CM

## 2023-06-14 DIAGNOSIS — E78.5 DYSLIPIDEMIA ASSOCIATED WITH TYPE 2 DIABETES MELLITUS: ICD-10-CM

## 2023-06-14 DIAGNOSIS — K76.0 NAFLD (NONALCOHOLIC FATTY LIVER DISEASE): ICD-10-CM

## 2023-06-14 DIAGNOSIS — E11.69 DYSLIPIDEMIA ASSOCIATED WITH TYPE 2 DIABETES MELLITUS: ICD-10-CM

## 2023-06-14 DIAGNOSIS — E11.42 DIABETIC POLYNEUROPATHY ASSOCIATED WITH TYPE 2 DIABETES MELLITUS: ICD-10-CM

## 2023-06-14 DIAGNOSIS — I10 ESSENTIAL HYPERTENSION: ICD-10-CM

## 2023-06-14 DIAGNOSIS — E66.9 OBESITY (BMI 30-39.9): ICD-10-CM

## 2023-06-14 DIAGNOSIS — E11.65 TYPE 2 DIABETES MELLITUS WITH HYPERGLYCEMIA, WITHOUT LONG-TERM CURRENT USE OF INSULIN: Primary | ICD-10-CM

## 2023-06-14 PROCEDURE — 3052F HG A1C>EQUAL 8.0%<EQUAL 9.0%: CPT | Mod: CPTII,95,, | Performed by: PHYSICIAN ASSISTANT

## 2023-06-14 PROCEDURE — 3052F PR MOST RECENT HEMOGLOBIN A1C LEVEL 8.0 - < 9.0%: ICD-10-PCS | Mod: CPTII,95,, | Performed by: PHYSICIAN ASSISTANT

## 2023-06-14 PROCEDURE — 99212 OFFICE O/P EST SF 10 MIN: CPT | Mod: 95,,, | Performed by: PHYSICIAN ASSISTANT

## 2023-06-14 PROCEDURE — 99212 PR OFFICE/OUTPT VISIT, EST, LEVL II, 10-19 MIN: ICD-10-PCS | Mod: 95,,, | Performed by: PHYSICIAN ASSISTANT

## 2023-06-14 RX ORDER — SEMAGLUTIDE 1.34 MG/ML
1 INJECTION, SOLUTION SUBCUTANEOUS
Qty: 3 ML | Refills: 11 | Status: SHIPPED | OUTPATIENT
Start: 2023-06-14 | End: 2023-08-01 | Stop reason: SDUPTHER

## 2023-06-14 NOTE — PROGRESS NOTES
PCP: Primary Doctor No    Subjective:     Chief Complaint: Diabetes     Established Patient - Audio Only Telehealth Visit     The patient location is: Home  The chief complaint leading to consultation is: Diabetes follow up  Visit type: Virtual visit with audio only (telephone)  Total Time Spent with Patient: 7 minutes     The reason for the audio only service rather than synchronous audio and video virtual visit was related to technical difficulties or patient preference/necessity.     Each patient to whom I provide medical services by telemedicine is:  (1) informed of the relationship between the physician and patient and the respective role of any other health care provider with respect to management of the patient; and (2) notified that they may decline to receive medical services by telemedicine and may withdraw from such care at any time. Patient verbally consented to receive this service via voice-only telephone call.    This service was not originating from a related E/M service provided within the previous 7 days nor will  to an E/M service or procedure within the next 24 hours or my soonest available appointment.  Prevailing standard of care was able to be met in this audio-only visit.      HISTORY OF PRESENT ILLNESS: 52 y.o.   male presenting for diabetes management.   The patient's last visit with me was on 5/3/2023.  Patient has had Type II diabetes since approximately 10 years ago. .  Pertinent to decision making is the following comorbidities: HTN, HLD, Fatty Liver, Obesity by BMI and SOPHIE   Patient has the following Diabetes complications: with diabetic polyneuropathy  He  has attended diabetes education in the past.     Patient's most recent A1c of 8.0% was completed 12 weeks ago.   Patient states since His last A1c His blood glucose levels have been high  throughout the day .   Patient monitors blood glucose 3 times per day with meter : Fasting, Before Lunch and Before  Dinner.  Patient blood glucose monitoring device will be uploaded into [employer] today.    Patient reports fasting 130 - 150 and up to 200 - 220 after meals.   Patient endorses the following diabetes related symptoms: None.  Patient is due today for the following diabetes-related health maintenance standards: Eye Exam, A1c, and Colorectal Cancer screening .   He denies recent hospital admissions or emergency room visits.   He denies having hypoglycemia.   Patient's concerns today include glycemic control.    CURRENT DM MEDICATIONS:   Humalog correction dosing every 3 hours as below  Metformin 1000 mg BID    Farxiga 10 mg daily  Glimepiride 4 mg before breakfast and 4 mg before dinner   Ozempic 0.5 mg weekly      Humalog Correction Dosing every 3 hours as needed OUTSIDE OF EATING  If  - 250, may take 4 units of Humalog  If  - 300, may take 8 units of Humalog   If  - 350, may take 12 units of Humalog  If  - 400, may take 16 units of Humalog  If +, may take 20 units of Humalog    Patient has failed the following Diabetes medications:   Trulicity / Victoza  - nausea   Toujeo   Humalog   Januvia  Jardiance - blurry vision   Glimepiride      Labs Reviewed.       No results found for: CPEPTIDE  No results found for: GLUTAMICACID       //   , There is no height or weight on file to calculate BMI.  His blood sugar in clinic today is:      Review of Systems   Constitutional:  Negative for activity change, appetite change, chills and fever.   HENT:  Negative for dental problem, mouth sores, nosebleeds, sore throat and trouble swallowing.    Eyes:  Negative for pain and discharge.   Respiratory:  Negative for shortness of breath, wheezing and stridor.    Cardiovascular:  Negative for chest pain, palpitations and leg swelling.   Gastrointestinal:  Negative for abdominal pain, diarrhea, nausea and vomiting.   Endocrine: Negative for polydipsia, polyphagia and polyuria.   Genitourinary:  Negative  for dysuria, frequency and urgency.   Musculoskeletal:  Negative for joint swelling and myalgias.   Skin:  Negative for rash and wound.   Neurological:  Negative for dizziness, syncope, weakness and headaches.   Psychiatric/Behavioral:  Negative for behavioral problems and dysphoric mood.        Diabetes Management Status  Statin: Taking  ACE/ARB: Not taking    Screening or Prevention Patient's value Goal Complete/Controlled?   HgA1C Testing and Control   Lab Results   Component Value Date    HGBA1C 8.0 (H) 03/08/2023      Annually/Less than 8% Yes   Lipid profile : 10/12/2022 Annually No   LDL control Lab Results   Component Value Date    LDLCALC 76.0 10/12/2022    Annually/Less than 100 mg/dl  No   Nephropathy screening Lab Results   Component Value Date    MICALBCREAT 25.0 10/12/2022     No results found for: PROTEINUA Annually No   Blood pressure BP Readings from Last 1 Encounters:   12/19/22 134/87    Less than 140/90 No   Dilated retinal exam : 06/16/2022 Annually No    Foot exam   : 12/19/2022 Annually Yes     Social History     Socioeconomic History    Marital status:    Tobacco Use    Smoking status: Former   Substance and Sexual Activity    Alcohol use: Never    Drug use: Never    Sexual activity: Yes     Partners: Female     Past Medical History:   Diagnosis Date    Arthritis     Chronic bilateral low back pain without sciatica 7/27/2020    X-ray Lumbar Spine December, 2019: Dextroscoliosis of the lumbar spine is noted.  There is minimal grade 1 retrolisthesis of L2 on L3 and L3 on L4.  No fracture is seen.  Degenerative changes are noted with spurring of the endplates and lower lumbar facet arthropathy.  Mild disc space narrowing at L2-L3.    Class 2 severe obesity due to excess calories with serious comorbidity and body mass index (BMI) of 38.0 to 38.9 in adult 9/22/2020    Diabetes mellitus, type 2     Essential hypertension 1/21/2020    Hepatosplenomegaly 10/7/2020    US ABDOMEN LIMITED  09/30/2020 FINDINGS: Liver: Increased in size, measuring 21.3 cm. Homogeneous increased echotexture. No focal hepatic lesions. Gallbladder: No calculi, wall thickening, or pericholecystic fluid. No sonographic Hdez's sign. Biliary system: The common duct is not dilated, measuring 5 mm. No intrahepatic ductal dilatation. Spleen: Increased in size and echotexture, measuring 15.9    Hyperlipidemia     Hypertension     Lesion of spleen 10/7/2020    US ABDOMEN LIMITED 09/30/2020 Spleen: Increased in size and echotexture, measuring 15.9 cm. There is heterogeneous area in the spleen which measures 3 x 2.2 x 2.1 cm with more central area of isoechogenicity with peripheral hypoechoic region. IMPRESSION: ...Indeterminate 3 cm heterogeneous mass in the spleen, potentially a hemangioma although other etiologies possible as well. Consider attention o    NAFLD (nonalcoholic fatty liver disease) 10/7/2020    US ABDOMEN LIMITED 09/30/2020 FINDINGS: Liver: Increased in size, measuring 21.3 cm. Homogeneous increased echotexture. No focal hepatic lesions. Gallbladder: No calculi, wall thickening, or pericholecystic fluid. No sonographic Hdez's sign. Biliary system: The common duct is not dilated, measuring 5 mm. No intrahepatic ductal dilatation. Spleen: Increased in size and echotexture, measuring 15.9    Obstructive sleep apnea syndrome 7/27/2020    Spondylosis of lumbar region without myelopathy or radiculopathy 7/27/2020    Type 2 diabetes mellitus with diabetic polyneuropathy, without long-term current use of insulin 7/27/2020    Type 2 diabetes mellitus with other specified complication 4/21/2020    Uncontrolled type 2 diabetes mellitus with hyperglycemia 4/21/2020       Objective:      Physical Exam  Neurological:      Mental Status: He is alert and oriented to person, place, and time. Mental status is at baseline.   Psychiatric:         Mood and Affect: Mood normal.         Behavior: Behavior normal.         Thought  Content: Thought content normal.         Judgment: Judgment normal.         Assessment / Plan:     Type 2 diabetes mellitus with hyperglycemia, without long-term current use of insulin  -     Hemoglobin A1C; Standing  -     Ambulatory referral/consult to Ophthalmology; Future; Expected date: 06/21/2023  -     semaglutide (OZEMPIC) 1 mg/dose (4 mg/3 mL); Inject 1 mg into the skin every 7 days.  Dispense: 3 mL; Refill: 11    Diabetic polyneuropathy associated with type 2 diabetes mellitus    Essential hypertension    Dyslipidemia associated with type 2 diabetes mellitus    NAFLD (nonalcoholic fatty liver disease)    Obstructive sleep apnea syndrome    Obesity (BMI 30-39.9)        Additional Plan Details:    - POCT Glucose  - Encouraged continuation of lifestyle changes including regular exercise and limiting carbohydrates to 30-45 grams per meal threes times daily and 15 grams per snack with a limit of two daily.   - Encouraged continued monitoring of blood glucose with maintenance of 3 times daily at Fasting, Before Lunch and Before Dinner.   - Current DM Medication Regimen: continue Farxiga 10 mg daily. Continue Metformin 1000 mg BID. Continue Glimepiride to 4 mg before breakfast (take 15 mins before) and 4 mg before dinner. Continue Humalog correction dosing every 3 hours. Change Ozempic 1 mg weekly.  - Nonfasting labs today  - Health Maintenance standards addressed today: Eye Exam - will be completed within [facility] system and scheduled today, A1c to be scheduled, and Colorectal Cancer screening - discussed; would like to discuss Fit Kit with PCP .   - Nursing Visit: Patient is below goal range for nursing visit for age group and will not need nursing visit at this time .   - Follow up with me in 6 weeks with A1c prior.     Humalog Correction Dosing every 3 hours as needed OUTSIDE OF EATING  If  - 250, may take 4 units of Humalog  If  - 300, may take 8 units of Humalog   If  - 350, may take 12  units of Humalog  If  - 400, may take 16 units of Humalog  If +, may take 20 units of Humalog    Wang Pringle PA-C  Diabetes Management

## 2023-06-14 NOTE — PATIENT INSTRUCTIONS
CURRENT DM MEDICATIONS:   Humalog correction dosing every 3 hours as below  Metformin 1000 mg BID    Farxiga 10 mg daily  Glimepiride 4 mg before breakfast and 4 mg before dinner   Ozempic 1 mg weekly      Humalog Correction Dosing every 3 hours as needed OUTSIDE OF EATING  If  - 250, may take 4 units of Humalog  If  - 300, may take 8 units of Humalog   If  - 350, may take 12 units of Humalog  If  - 400, may take 16 units of Humalog  If +, may take 20 units of Humalog

## 2023-06-16 ENCOUNTER — PATIENT MESSAGE (OUTPATIENT)
Dept: OPHTHALMOLOGY | Facility: CLINIC | Age: 53
End: 2023-06-16
Payer: MEDICAID

## 2023-06-19 ENCOUNTER — OFFICE VISIT (OUTPATIENT)
Dept: PODIATRY | Facility: CLINIC | Age: 53
End: 2023-06-19
Payer: MEDICAID

## 2023-06-19 VITALS — HEIGHT: 74 IN | WEIGHT: 294 LBS | BODY MASS INDEX: 37.73 KG/M2

## 2023-06-19 DIAGNOSIS — M79.672 LEFT FOOT PAIN: ICD-10-CM

## 2023-06-19 DIAGNOSIS — E66.01 SEVERE OBESITY: ICD-10-CM

## 2023-06-19 DIAGNOSIS — E11.42 TYPE 2 DIABETES MELLITUS WITH DIABETIC POLYNEUROPATHY, WITHOUT LONG-TERM CURRENT USE OF INSULIN: ICD-10-CM

## 2023-06-19 DIAGNOSIS — M72.2 PLANTAR FASCIITIS OF LEFT FOOT: Primary | ICD-10-CM

## 2023-06-19 PROCEDURE — 99999 PR PBB SHADOW E&M-EST. PATIENT-LVL III: ICD-10-PCS | Mod: PBBFAC,,, | Performed by: PODIATRIST

## 2023-06-19 PROCEDURE — 99999 PR PBB SHADOW E&M-EST. PATIENT-LVL III: CPT | Mod: PBBFAC,,, | Performed by: PODIATRIST

## 2023-06-19 PROCEDURE — 3008F BODY MASS INDEX DOCD: CPT | Mod: CPTII,,, | Performed by: PODIATRIST

## 2023-06-19 PROCEDURE — 1159F PR MEDICATION LIST DOCUMENTED IN MEDICAL RECORD: ICD-10-PCS | Mod: CPTII,,, | Performed by: PODIATRIST

## 2023-06-19 PROCEDURE — 3052F HG A1C>EQUAL 8.0%<EQUAL 9.0%: CPT | Mod: CPTII,,, | Performed by: PODIATRIST

## 2023-06-19 PROCEDURE — 1159F MED LIST DOCD IN RCRD: CPT | Mod: CPTII,,, | Performed by: PODIATRIST

## 2023-06-19 PROCEDURE — 99214 PR OFFICE/OUTPT VISIT, EST, LEVL IV, 30-39 MIN: ICD-10-PCS | Mod: S$PBB,,, | Performed by: PODIATRIST

## 2023-06-19 PROCEDURE — 1160F PR REVIEW ALL MEDS BY PRESCRIBER/CLIN PHARMACIST DOCUMENTED: ICD-10-PCS | Mod: CPTII,,, | Performed by: PODIATRIST

## 2023-06-19 PROCEDURE — 1160F RVW MEDS BY RX/DR IN RCRD: CPT | Mod: CPTII,,, | Performed by: PODIATRIST

## 2023-06-19 PROCEDURE — 3008F PR BODY MASS INDEX (BMI) DOCUMENTED: ICD-10-PCS | Mod: CPTII,,, | Performed by: PODIATRIST

## 2023-06-19 PROCEDURE — 99213 OFFICE O/P EST LOW 20 MIN: CPT | Mod: PBBFAC | Performed by: PODIATRIST

## 2023-06-19 PROCEDURE — 3052F PR MOST RECENT HEMOGLOBIN A1C LEVEL 8.0 - < 9.0%: ICD-10-PCS | Mod: CPTII,,, | Performed by: PODIATRIST

## 2023-06-19 PROCEDURE — 99214 OFFICE O/P EST MOD 30 MIN: CPT | Mod: S$PBB,,, | Performed by: PODIATRIST

## 2023-06-19 RX ORDER — METHYLPREDNISOLONE 4 MG/1
TABLET ORAL
Qty: 21 EACH | Refills: 0 | Status: SHIPPED | OUTPATIENT
Start: 2023-06-19 | End: 2023-07-10

## 2023-06-19 NOTE — PROGRESS NOTES
Subjective:       Patient ID: Jean Claude Ocasio is a 52 y.o. male.    Chief Complaint: Foot Pain (C/o left foot pain for about a month, diabetic pt, pt is wearing slippers, rates pain 7/10, pt was last seen on 10/7/20 by PCP GUTIERREZ Crook MD)      HPI: Jean Claude Ocasio presents to clinic today to follow-up on plantar fasciitis to the left foot.  States attempted to utilize orthotics and performing stretching exercises.  States some mild to minimal improvement.  Continues to state post static dyskinesia with 7/10 pain. States walking and standing causes and/or exacerbates the symptoms. Patient has had no corticosteroid injection(s) prior. Patient's Primary Care Provider is Primary Doctor No.     Review of patient's allergies indicates:   Allergen Reactions    Codeine Rash and Anaphylaxis     Other reaction(s): Reaction:Anaphylaxis;    Hydrocodone-acetaminophen Rash     Other reaction(s): Unknown       Past Medical History:   Diagnosis Date    Arthritis     Chronic bilateral low back pain without sciatica 7/27/2020    X-ray Lumbar Spine December, 2019: Dextroscoliosis of the lumbar spine is noted.  There is minimal grade 1 retrolisthesis of L2 on L3 and L3 on L4.  No fracture is seen.  Degenerative changes are noted with spurring of the endplates and lower lumbar facet arthropathy.  Mild disc space narrowing at L2-L3.    Class 2 severe obesity due to excess calories with serious comorbidity and body mass index (BMI) of 38.0 to 38.9 in adult 9/22/2020    Diabetes mellitus, type 2     Essential hypertension 1/21/2020    Hepatosplenomegaly 10/7/2020    US ABDOMEN LIMITED 09/30/2020 FINDINGS: Liver: Increased in size, measuring 21.3 cm. Homogeneous increased echotexture. No focal hepatic lesions. Gallbladder: No calculi, wall thickening, or pericholecystic fluid. No sonographic Hdez's sign. Biliary system: The common duct is not dilated, measuring 5 mm. No intrahepatic ductal dilatation. Spleen: Increased in size and  "echotexture, measuring 15.9    Hyperlipidemia     Hypertension     Lesion of spleen 10/7/2020    US ABDOMEN LIMITED 09/30/2020 Spleen: Increased in size and echotexture, measuring 15.9 cm. There is heterogeneous area in the spleen which measures 3 x 2.2 x 2.1 cm with more central area of isoechogenicity with peripheral hypoechoic region. IMPRESSION: ...Indeterminate 3 cm heterogeneous mass in the spleen, potentially a hemangioma although other etiologies possible as well. Consider attention o    NAFLD (nonalcoholic fatty liver disease) 10/7/2020    US ABDOMEN LIMITED 09/30/2020 FINDINGS: Liver: Increased in size, measuring 21.3 cm. Homogeneous increased echotexture. No focal hepatic lesions. Gallbladder: No calculi, wall thickening, or pericholecystic fluid. No sonographic Hdez's sign. Biliary system: The common duct is not dilated, measuring 5 mm. No intrahepatic ductal dilatation. Spleen: Increased in size and echotexture, measuring 15.9    Obstructive sleep apnea syndrome 7/27/2020    Spondylosis of lumbar region without myelopathy or radiculopathy 7/27/2020    Type 2 diabetes mellitus with diabetic polyneuropathy, without long-term current use of insulin 7/27/2020    Type 2 diabetes mellitus with other specified complication 4/21/2020    Uncontrolled type 2 diabetes mellitus with hyperglycemia 4/21/2020       Family History   Problem Relation Age of Onset    Hypertension Father        Social History     Socioeconomic History    Marital status:    Tobacco Use    Smoking status: Former   Substance and Sexual Activity    Alcohol use: Never    Drug use: Never    Sexual activity: Yes     Partners: Female       History reviewed. No pertinent surgical history.    Review of Systems      Objective:   Ht 6' 2" (1.88 m)   Wt 133.4 kg (294 lb)   BMI 37.75 kg/m²     US Abdomen Limited  Narrative: EXAMINATION:  US ABDOMEN LIMITED    CLINICAL HISTORY:  Spleen lesion (probably hemangioma) seen on prior imaging; Other " diseases of spleen    TECHNIQUE:  Limited ultrasound of the right upper quadrant of the abdomen (including pancreas, liver, gallbladder, common bile duct, and right kidney) was performed.    COMPARISON:  05/04/2021.    FINDINGS:  Liver: Enlarged, measuring 23.3 cm. Increased parenchymal echogenicity and mildly coarsened echotexture similar to prior.  No focal hepatic lesions.    Gallbladder: No calculi, wall thickening, or pericholecystic fluid.  No sonographic Hdez's sign.    Biliary system: The common duct is not dilated, measuring 5.0 mm.  No intrahepatic ductal dilatation.    Pancreas: Obscured by overlying bowel gas.    Spleen: Enlarged, measuring 15.9 cm.  No significant change in heterogeneous lesion measuring 2.8 cm, likely hemangioma.    Miscellaneous: No upper abdominal ascites.  Impression: 1. Hepatomegaly with parenchymal changes consistent with hepatic steatosis or other chronic liver disease.  2. Splenomegaly and stable splenic lesion, likely hemangioma.    Electronically signed by: JULIAN Cummings MD  Date:    09/21/2021  Time:    07:34      Physical Exam  LOWER EXTREMITY PHYSICAL EXAMINATION    VASCULAR: The right DP pulse is 2/4 and the left DP is 2/4. The right PT pulse is 2/4 and the left PT pulse is 2/4. Proximal to distal, warm to warm. No dependent rubor or elevation palor is noted. Capillary refill time is less than 3 seconds. Hair growth is appreciated to the dorsal foot and digits.    NEUROLOGY: Proprioception is intact, bilateral. Sensation to light touch is intact. Negative Tinel's Sign and negative Valleix Sign. No neurological sensations with compression of the area of Brown's Nerve in the area of the Abductor Hallucis muscle belly.    ORTHOPEDIC:  Moderate tenderness to palpation of the area around the plantar medial calcaneal tubercle on the left foot. Pains are characterized as sharp and stabbing-like with direct palpation of the area. There is also mild pain to palpation of the  immediate plantar aspect of the heel, and no pains to the lateral band of the fascia. No edema is noted. No fullness is noted. No pains or defects are noted within the plantar fascia at the arch. No plantar fibromas are noted. No defects are noted within the ligament. Dorsiflexion of the MTPJs with simultaneous palpation of the fascia at the arch, does worsen and exacerbate the pains. No pains with medial to lateral compression of the heel. Equinus contracture is noted. Antalgic gait pattern is noted.     DERMATOLOGY: No ecchymosis is noted.  Skin is supple, dry and intact. Skin is supple.  No hyperkeratosis noted. No calluses.  No open wounds or ulcerations are noted.  No palpable plantar fibromas noted.    Assessment:     1. Plantar fasciitis of left foot    2. Left foot pain    3. Type 2 diabetes mellitus with diabetic polyneuropathy, without long-term current use of insulin    4. Severe obesity          Plan:     Plantar fasciitis of left foot  -     Ambulatory referral/consult to Physical/Occupational Therapy; Future; Expected date: 06/26/2023    Left foot pain  -     Ambulatory referral/consult to Physical/Occupational Therapy; Future; Expected date: 06/26/2023    Type 2 diabetes mellitus with diabetic polyneuropathy, without long-term current use of insulin    Severe obesity    Other orders  -     methylPREDNISolone (MEDROL DOSEPACK) 4 mg tablet; use as directed  Dispense: 21 each; Refill: 0        Thorough discussion is had with the patient today concerning the diagnosis, its etiology, and the treatment algorithm at present.    Continue to recommend the  importance of stretching to the posterior muscle groups of the gastrocnemius and the soleus.  A stretching sheet was provided to the patient in conjunction with a Thera-Band.  I do recommend patient perform stretching exercises 4-6 times per day and holding the stretches for approximately 15-30 seconds apiece.  We discussed importance of stretching as  relates to lengthening the posterior muscle group which can decrease drain on the posterior aspect of the heel as well as the plantar aspect of the heel.  This will also decrease pain associated with post static dyskinesia.  Teach back mechanism was performed with the patient demonstrating the stretching exercises.    Did discussed possible physical therapy with/without oral steroid versus steroid injection into the affected foot.  Patient relates interest in oral steroid pack with physical therapy.  Did discuss risk of steroid use as relates to increased anxiety, insomnia, steroid flares, elevated blood sugars, swelling.  Patient relates understanding    Foot counseling and education is provided at this visit. Patient is advised to wear socks and shoes at all times.  Do not walk barefoot, or with just socks, even when indoors.  Be sure to check and inspect the inside of the shoe before putting them on her feet.  Protect your feet at all times.  Walking shoes and/or athletic shoes are the best types of shoe gear. Do not wear vinyl or plastic type shoe gear, as they do not stretch and/or breathe.  Protect your feet from hot and/or cold. Elevate the extremities when sitting.  Do not wear excessively tight socks and/or shoe gear. Wiggle your toes for a few minutes throughout the day. Move your ankles up and down, in and out, to help blood flow in your lower extremity.       Future Appointments   Date Time Provider Department Center   6/30/2023  8:00 AM GIANCARLO Guajardo [facility]   8/1/2023  3:30 PM Wang Pringle PA-C [facility]   12/11/2023  [facility]

## 2023-06-21 ENCOUNTER — CLINICAL SUPPORT (OUTPATIENT)
Dept: REHABILITATION | Facility: HOSPITAL | Age: 53
End: 2023-06-21
Attending: PODIATRIST
Payer: MEDICAID

## 2023-06-21 DIAGNOSIS — M72.2 PLANTAR FASCIITIS OF LEFT FOOT: ICD-10-CM

## 2023-06-21 DIAGNOSIS — R29.898 DECREASED STRENGTH OF LOWER EXTREMITY: ICD-10-CM

## 2023-06-21 DIAGNOSIS — M25.672 DECREASED RANGE OF MOTION OF LEFT ANKLE: ICD-10-CM

## 2023-06-21 DIAGNOSIS — M79.672 LEFT FOOT PAIN: ICD-10-CM

## 2023-06-21 DIAGNOSIS — Z74.09 DECREASED FUNCTIONAL MOBILITY AND ENDURANCE: ICD-10-CM

## 2023-06-21 PROCEDURE — 97162 PT EVAL MOD COMPLEX 30 MIN: CPT

## 2023-06-21 PROCEDURE — 97110 THERAPEUTIC EXERCISES: CPT

## 2023-06-21 NOTE — PLAN OF CARE
[facility]   Physical Therapy Initial Evaluation      Date: 6/21/2023   [facility]  Clinic Number: [id number]    Therapy Diagnosis:    Encounter Diagnoses   Name Primary?    Plantar fasciitis of left foot     Left foot pain     Decreased range of motion of left ankle     Decreased strength of lower extremity     Decreased functional mobility and endurance       Physician: Jyoti Corbett DPM     Physician Orders: Physical Therapy Evaluation and Treat  Medical Diagnosis from Referral:   M72.2 (ICD-10-CM) - Plantar fasciitis of left foot   M79.672 (ICD-10-CM) - Left foot pain   Evaluation Date: 6/21/2023  Authorization Period Expiration: 12/31/2023  Plan of Care Expiration: 09/12/2023  Progress Note Due: 07/21/2023  Visit # / Visits authorized: 1/1   FOTO: 1/3     Precautions: Standard    Time In: 1:45 PM  Time Out: 2:25 PM  Total Billable Time (timed & untimed codes): 40 minutes    SUBJECTIVE   Date of onset: 2 months prior    History of current condition - Jean Claude reports: pain at medial side left foot near navicular for about 2 months. Patient states the pain is the worst when he kneels down on his knees to pray, which he does 5 times per day. Patient also reports pain with prolonged standing or walking.    Falls: none    Exercise Regimen: sometimes     Imaging: Not Applicable     Pain:  Current 1/10, worst 8/10, best 0/10   Location: [] Right   [x] Left:  medial foot/navicular   Description: Burning and Superficial  Aggravating Factors: kneeling, standing/walking for long periods of time  Easing Factors: activity avoidance, rest    Prior Therapy:   [] N/A    [x] Yes:   Social History: Patient lives with their family  Occupation: Patient is   Prior Level of Function: Independent and pain free with all ADL, IADL, community mobility and functional activities.   Current Level of Function: Independent with all ADL, IADL, community mobility and functional activities with  reports of increased pain and need for increased time and frequent breaks.      Dominant Extremity:    [x] Right    [] Left      Medical History:   Past Medical History:   Diagnosis Date    Arthritis     Chronic bilateral low back pain without sciatica 7/27/2020    X-ray Lumbar Spine December, 2019: Dextroscoliosis of the lumbar spine is noted.  There is minimal grade 1 retrolisthesis of L2 on L3 and L3 on L4.  No fracture is seen.  Degenerative changes are noted with spurring of the endplates and lower lumbar facet arthropathy.  Mild disc space narrowing at L2-L3.    Class 2 severe obesity due to excess calories with serious comorbidity and body mass index (BMI) of 38.0 to 38.9 in adult 9/22/2020    Diabetes mellitus, type 2     Essential hypertension 1/21/2020    Hepatosplenomegaly 10/7/2020    US ABDOMEN LIMITED 09/30/2020 FINDINGS: Liver: Increased in size, measuring 21.3 cm. Homogeneous increased echotexture. No focal hepatic lesions. Gallbladder: No calculi, wall thickening, or pericholecystic fluid. No sonographic Hdez's sign. Biliary system: The common duct is not dilated, measuring 5 mm. No intrahepatic ductal dilatation. Spleen: Increased in size and echotexture, measuring 15.9    Hyperlipidemia     Hypertension     Lesion of spleen 10/7/2020    US ABDOMEN LIMITED 09/30/2020 Spleen: Increased in size and echotexture, measuring 15.9 cm. There is heterogeneous area in the spleen which measures 3 x 2.2 x 2.1 cm with more central area of isoechogenicity with peripheral hypoechoic region. IMPRESSION: ...Indeterminate 3 cm heterogeneous mass in the spleen, potentially a hemangioma although other etiologies possible as well. Consider attention o    NAFLD (nonalcoholic fatty liver disease) 10/7/2020    US ABDOMEN LIMITED 09/30/2020 FINDINGS: Liver: Increased in size, measuring 21.3 cm. Homogeneous increased echotexture. No focal hepatic lesions. Gallbladder: No calculi, wall thickening, or pericholecystic fluid.  No sonographic Hdez's sign. Biliary system: The common duct is not dilated, measuring 5 mm. No intrahepatic ductal dilatation. Spleen: Increased in size and echotexture, measuring 15.9    Obstructive sleep apnea syndrome 7/27/2020    Spondylosis of lumbar region without myelopathy or radiculopathy 7/27/2020    Type 2 diabetes mellitus with diabetic polyneuropathy, without long-term current use of insulin 7/27/2020    Type 2 diabetes mellitus with other specified complication 4/21/2020    Uncontrolled type 2 diabetes mellitus with hyperglycemia 4/21/2020       Surgical History:   Jean Calude Ocaiso  has no past surgical history on file.    Medications:   Jean Claude has a current medication list which includes the following prescription(s): accu-chek guide test strips, aspirin, atorvastatin, blood pressure monitor, blood-glucose meter, farxiga, diclofenac sodium, glimepiride, insulin lispro, lancets, lisinopril, loratadine, meloxicam, metformin, methylprednisolone, pen needle, diabetic, and ozempic.    Allergies:   Review of patient's allergies indicates:   Allergen Reactions    Codeine Rash and Anaphylaxis     Other reaction(s): Reaction:Anaphylaxis;    Hydrocodone-acetaminophen Rash     Other reaction(s): Unknown          OBJECTIVE     Sensation:  [x] Intact to Light Touch   [] Impaired:    Palpation: Increased tone and tenderness noted with palpation of eft navicular and left abductor hallices     Posture: Patient presents with postural abnormalities which include:    [x] Forward Head   [] Increased Lumbar Lordosis   [x] Rounded Shoulder   [] Genu Recurvatum   [] Increased Thoracic Kyphosis [] Genu Valgus   [] Trunk Deviated    [] Pes Planus   [] Scapular Winging    [] Other:     Gait Analysis: The patient ambulated with the following assistive device: none; the patient presents with the following gait abnormalities: decreased gait speed and decreased heel strike left LOWER EXTREMITY (patient wearing flip  flops)      Functional Movement  Analysis   kneeling Painful  and Dysfunctional       RANGE OF MOTION:      Ankle/Foot AROM/PROM Right Left Pain/Dysfunction with Movement Goal   Dorsiflexion (20) WITHIN FUNCTIONAL LIMITS  WITHIN FUNCTIONAL LIMITS   -   Plantarflexion (50) WITHIN FUNCTIONAL LIMITS  40  50 bilateral   Inversion (35) WITHIN FUNCTIONAL LIMITS  WITHIN FUNCTIONAL LIMITS   -   Eversion (15) WITHIN FUNCTIONAL LIMITS  10  15 bilateral   Great Toe Extension (70) WITHIN FUNCTIONAL LIMITS  WITHIN FUNCTIONAL LIMITS   -     STRENGTH:    L/E MMT Right   Left Pain/Dysfunction with Movement Goal   Ankle Dorsiflexion 5/5 4+/5  5/5 Bilateral   Ankle Plantarflexion 5/5 4-/5  5/5 Bilateral   Ankle Inversion 5/5 4-/5  5/5 Bilateral   Ankle Eversion 5/5 3+/5  5/5 Bilateral       MUSCLE LENGTH:     Muscle Tested  Right Left  Goal   Gastrocnemius  normal decreased Normal Bilateral   Soleus  normal decreased Normal Bilateral       JOINT MOBILITY:     Joint Motion Tested Right   Left  Goal   talocrural Normal Hypomobile Normal Bilateral       FUNCTION:     CMS Impairment/Limitation/Restriction for FOTO Foot Survey    Therapist reviewed FOTO scores for Jean Claude on 6/21/2023.   FOTO documents entered into [facility] - see Media section.    Intake Score: 44 %         TREATMENT       Jean Claude received the treatments listed below:      THERAPEUTIC EXERCISES to develop strength, endurance, ROM, flexibility, posture, and core stabilization for (15) minutes including:    Intervention Performed Today    Education on current condition x 15 minutes                                        Plan for Next Visit: recumbent bike, towel scrunches, big toe abduction, toe yoga, calf stretching, ankle 4 ways, arch doming, SOFT TISSUE MOBILIZATION to abductor hallices      PATIENT EDUCATION AND HOME EXERCISES     Education provided: (15) minutes  PURPOSE: Patient educated on the impairments noted above and the effects of physical therapy intervention to  improve overall condition and QOL.   EXERCISE: Patient was educated on all the above exercise prior/during/after for proper posture, positioning, and execution for safe performance with home exercise program.   STRENGTH: Patient educated on the importance of improved core and extremity strength in order to improve alignment of the spine and extremities with static positions and dynamic movement.     Written Home Exercises Provided: no.  Exercises were reviewed and Jean Claude was able to demonstrate them prior to the end of the session.  Jean Claude demonstrated good  understanding of the education provided. See EMR under Patient Instructions for exercises provided during therapy sessions.    ASSESSMENT     Jean Claude is a 53 y.o. male referred to outpatient Physical Therapy with a medical diagnosis of left foot pain. Patient presents with impairments including: impairments list: ROM, strength, endurance, joint mobility, muscle length, gait mechanics, and functional movement patterns.    Patient prognosis is Good.   Patient will benefit from skilled outpatient Physical Therapy to address the deficits stated above and in the chart below, provide patient/family education, and to maximize patient's level of independence.     Plan of care discussed with patient: Yes  Patient's spiritual, cultural and educational needs considered and patient is agreeable to the plan of care and goals as stated below:     Anticipated Barriers for therapy: co-morbidities, sedentary lifestyle, chronicity of condition, and occupation    Medical Necessity is demonstrated by the following   History  Co-morbidities and personal factors that may impact the plan of care [] LOW: no personal factors / co-morbidities  [] MODERATE: 1-2 personal factors / co-morbidities  [x] HIGH: 3+ personal factors / co-morbidities    Moderate / High Support Documentation:   Past Medical History:   Diagnosis Date    Arthritis     Chronic bilateral low back pain without sciatica  7/27/2020    X-ray Lumbar Spine December, 2019: Dextroscoliosis of the lumbar spine is noted.  There is minimal grade 1 retrolisthesis of L2 on L3 and L3 on L4.  No fracture is seen.  Degenerative changes are noted with spurring of the endplates and lower lumbar facet arthropathy.  Mild disc space narrowing at L2-L3.    Class 2 severe obesity due to excess calories with serious comorbidity and body mass index (BMI) of 38.0 to 38.9 in adult 9/22/2020    Diabetes mellitus, type 2     Essential hypertension 1/21/2020    Hepatosplenomegaly 10/7/2020    US ABDOMEN LIMITED 09/30/2020 FINDINGS: Liver: Increased in size, measuring 21.3 cm. Homogeneous increased echotexture. No focal hepatic lesions. Gallbladder: No calculi, wall thickening, or pericholecystic fluid. No sonographic Hdez's sign. Biliary system: The common duct is not dilated, measuring 5 mm. No intrahepatic ductal dilatation. Spleen: Increased in size and echotexture, measuring 15.9    Hyperlipidemia     Hypertension     Lesion of spleen 10/7/2020    US ABDOMEN LIMITED 09/30/2020 Spleen: Increased in size and echotexture, measuring 15.9 cm. There is heterogeneous area in the spleen which measures 3 x 2.2 x 2.1 cm with more central area of isoechogenicity with peripheral hypoechoic region. IMPRESSION: ...Indeterminate 3 cm heterogeneous mass in the spleen, potentially a hemangioma although other etiologies possible as well. Consider attention o    NAFLD (nonalcoholic fatty liver disease) 10/7/2020    US ABDOMEN LIMITED 09/30/2020 FINDINGS: Liver: Increased in size, measuring 21.3 cm. Homogeneous increased echotexture. No focal hepatic lesions. Gallbladder: No calculi, wall thickening, or pericholecystic fluid. No sonographic Hdez's sign. Biliary system: The common duct is not dilated, measuring 5 mm. No intrahepatic ductal dilatation. Spleen: Increased in size and echotexture, measuring 15.9    Obstructive sleep apnea syndrome 7/27/2020    Spondylosis of  lumbar region without myelopathy or radiculopathy 7/27/2020    Type 2 diabetes mellitus with diabetic polyneuropathy, without long-term current use of insulin 7/27/2020    Type 2 diabetes mellitus with other specified complication 4/21/2020    Uncontrolled type 2 diabetes mellitus with hyperglycemia 4/21/2020         Examination  Body Structures and Functions, activity limitations and participation restrictions that may impact the plan of care [] LOW: addressing 1-2 elements  [x] MODERATE: 3+ elements  [] HIGH: 4+ elements (please support below)    Moderate / High Support Documentation: See evaluation / objective measurements above and FOTO.     Clinical Presentation [] LOW: stable  [x] MODERATE: Evolving  [] HIGH: Unstable     Decision Making/ Complexity Score: moderate         Goals:  Reviewed:6/21/2023      Short Term Goals:  6 weeks Status  Date Met   PAIN: Patient will report worst pain of 5/10 in order to progress toward max functional ability and improve quality of life. [x] Progressing  [] Met  [] Not Met    FUNCTION: Patient will demonstrate improved function as indicated by a intake score of greater than or equal to 62 out of 100 on FOTO. [x] Progressing  [] Met  [] Not Met    MOBILITY: Patient will improve Range of Motion to 50% of stated goals, listed in objective measures above, in order to progress towards independence with functional activities.  [x] Progressing  [] Met  [] Not Met    STRENGTH: Patient will improve strength to 50% of stated goals, listed in objective measures above, in order to progress towards independence with functional activities.  [x] Progressing  [] Met  [] Not Met      Long Term Goals:  12 weeks Status Date Met   PAIN: Patient will report worst pain of 3/10 in order to progress toward max functional ability and improve quality of life [x] Progressing  [] Met  [] Not Met    FUNCTION: Patient will demonstrate improved function as indicated by a intake score of greater than or  equal to 88 out of 100 on FOTO. [x] Progressing  [] Met  [] Not Met    MOBILITY: Patient will improve Range of Motion to stated goals, listed in objective measures above, in order to return to maximal functional potential and improve quality of life. [x] Progressing  [] Met  [] Not Met    STRENGTH: Patient will improve strength to stated goals, listed in objective measures above, in order to improve functional independence and quality of life. [x] Progressing  [] Met  [] Not Met    GAIT: Patient will demonstrate normalized gait mechanics with minimal compensation in order to return to prior level of function. [x] Progressing  [] Met  [] Not Met    Patient will return to normal ADL's, IADL's, community involvement, recreational activities, and work-related activities with less than or equal to 3/10 pain and maximal function.  [x] Progressing  [] Met  [] Not Met      PLAN   Plan of Care Certification: 6/21/2023 to 09/12/2023.    Outpatient Physical Therapy 2 times weekly for 12 weeks to include any combination of the following interventions: virtual visits, dry needling, modalities, electrical stimulation (IFC, Pre-Mod, Attended with Functional Dry Needling), Cervical/Lumbar Traction, Gait Training, Manual Therapy, Moist Heat/ Ice, Neuromuscular Re-ed, Patient Education, Self Care, Therapeutic Exercise, and Therapeutic Activites     Ashanti Martinez, PT, DPT      Physician's Signature: ___________________________________________________

## 2023-06-26 ENCOUNTER — CLINICAL SUPPORT (OUTPATIENT)
Dept: REHABILITATION | Facility: HOSPITAL | Age: 53
End: 2023-06-26
Attending: PODIATRIST
Payer: MEDICAID

## 2023-06-26 DIAGNOSIS — Z74.09 DECREASED FUNCTIONAL MOBILITY AND ENDURANCE: ICD-10-CM

## 2023-06-26 DIAGNOSIS — M25.672 DECREASED RANGE OF MOTION OF LEFT ANKLE: Primary | ICD-10-CM

## 2023-06-26 DIAGNOSIS — R29.898 DECREASED STRENGTH OF LOWER EXTREMITY: ICD-10-CM

## 2023-06-26 PROCEDURE — 97110 THERAPEUTIC EXERCISES: CPT | Mod: CQ

## 2023-06-26 NOTE — PROGRESS NOTES
[facility]   Physical Therapy Treatment Note        Name: Jean Claude JORDAN [facility]  Clinic Number: [id number]    Therapy Diagnosis:   Encounter Diagnoses   Name Primary?    Decreased range of motion of left ankle Yes    Decreased strength of lower extremity     Decreased functional mobility and endurance      Physician: Jyoti Corbett DPM    Visit Date: 6/26/2023    Physician Orders: Physical Therapy Evaluation and Treat  Medical Diagnosis from Referral:   M72.2 (ICD-10-CM) - Plantar fasciitis of left foot   M79.672 (ICD-10-CM) - Left foot pain   Evaluation Date: 6/21/2023  Authorization Period Expiration: 12/31/2023  Plan of Care Expiration: 09/12/2023  Progress Note Due: 07/21/2023  Visit # / Visits authorized: 1/25 (+ evaluation)  FOTO: 1/3      Precautions: Standard    PTA Visit #: 1/5     Time In: 1600  Time Out: 1645  Total Billable Time: 45 minutes (Billing reflects 1 on 1 treatment time spent with patient)    Subjective     Patient reports: generally more pain un the mornings upon awakening. Pain is described as a burning sensation     He N/A compliant with home exercise program.    Response to previous treatment: no difference after evaluation    Functional change: unable to kneel which interrupts his prayer time,     Pain: 5/10     Location: left foot arch    Objective      Objective Measures updated at progress report or POC update only unless otherwise noted.       Treatment     Jean Claude received the treatments listed below:     MANUAL THERAPY TECHNIQUES were applied for (12) minutes, including:    Manual Intervention Performed Today    Soft Tissue Mobilization [x] left  gastrocnemius, soleus, peroneals, posterior tibialis, foot intrinsics   Joint Mobilizations []     []     []    Functional Dry Needling  []      Plan for Next Visit: Continue as needed           THERAPEUTIC EXERCISES to develop strength, endurance, ROM, flexibility, posture, and core stabilization for (33) minutes  including:    Intervention Performed Today    nustep x 5 minutes for ankle range of motion    Ankle active range of motion  x 3 x 10 all directions sitting   Ankle circles  x Counter clockwise/clockwise each x 10 sitting   Toe flexion/extension x X 30 sitting   Toe yoga x 1 minutes x 2 each sitting   Gastrocnemius stretch on wedge x 3 x 30 seconds wedge   Soleus stretch on wedge x 3 x 30 seconds wedge                Plan for Next Visit:        Patient Education and Home Exercises       Home Exercises Provided and Patient Education Provided     Education provided: (during session) minutes  PURPOSE: Patient educated on the impairments noted above and the effects of physical therapy intervention to improve overall condition and QOL.   EXERCISE: Patient was educated on all the above exercise prior/during/after for proper posture, positioning, and execution for safe performance with home exercise program.   STRENGTH: Patient educated on the importance of improved core and extremity strength in order to improve alignment of the spine and extremities with static positions and dynamic movement.   6/23/2023: added home exercise program to my chart and gave patient paper copy and gave yellow band to patient    Written Home Exercises Provided: yes.  Exercises were reviewed and Jean Claude was able to demonstrate them prior to the end of the session.  Jean Claude demonstrated good  understanding of the education provided. See EMR under Patient Instructions for exercises provided during therapy sessions.    Assessment     Patient was able to perform active range of motion at his left ankle with good movement quality. He reports increased symptoms in his left great toe with manual therapy to his left gastrocnemius muscle belly. Tightness noted with gastrocnemius/soleus wedge stretches.     Jean Claude is progressing well towards his goals.   Patient prognosis is Good.     Patient will continue to benefit from skilled outpatient physical therapy to  address the deficits listed in the problem list box on initial evaluation, provide pt/family education and to maximize patient's level of independence in the home and community environment.     Patient's spiritual, cultural and educational needs considered and pt agreeable to plan of care and goals.     Anticipated Barriers for therapy: co-morbidities, sedentary lifestyle, chronicity of condition, and occupation    Goals:  Reviewed:6/21/2023       Short Term Goals:  6 weeks Status  Date Met   PAIN: Patient will report worst pain of 5/10 in order to progress toward max functional ability and improve quality of life. [x] Progressing  [] Met  [] Not Met     FUNCTION: Patient will demonstrate improved function as indicated by a intake score of greater than or equal to 62 out of 100 on FOTO. [x] Progressing  [] Met  [] Not Met     MOBILITY: Patient will improve Range of Motion to 50% of stated goals, listed in objective measures above, in order to progress towards independence with functional activities.  [x] Progressing  [] Met  [] Not Met     STRENGTH: Patient will improve strength to 50% of stated goals, listed in objective measures above, in order to progress towards independence with functional activities.  [x] Progressing  [] Met  [] Not Met        Long Term Goals:  12 weeks Status Date Met   PAIN: Patient will report worst pain of 3/10 in order to progress toward max functional ability and improve quality of life [x] Progressing  [] Met  [] Not Met     FUNCTION: Patient will demonstrate improved function as indicated by a intake score of greater than or equal to 88 out of 100 on FOTO. [x] Progressing  [] Met  [] Not Met     MOBILITY: Patient will improve Range of Motion to stated goals, listed in objective measures above, in order to return to maximal functional potential and improve quality of life. [x] Progressing  [] Met  [] Not Met     STRENGTH: Patient will improve strength to stated goals, listed in  objective measures above, in order to improve functional independence and quality of life. [x] Progressing  [] Met  [] Not Met     GAIT: Patient will demonstrate normalized gait mechanics with minimal compensation in order to return to prior level of function. [x] Progressing  [] Met  [] Not Met     Patient will return to normal ADL's, IADL's, community involvement, recreational activities, and work-related activities with less than or equal to 3/10 pain and maximal function.  [x] Progressing  [] Met  [] Not Met           Plan     Continue Plan of Care (POC) and progress per patient tolerance. See treatment section for details on planned progressions next session.      Hernando Hebert, PTA

## 2023-07-03 ENCOUNTER — DOCUMENTATION ONLY (OUTPATIENT)
Dept: REHABILITATION | Facility: HOSPITAL | Age: 53
End: 2023-07-03
Payer: MEDICAID

## 2023-07-03 NOTE — PROGRESS NOTES
PT/PTA met face to face to discuss pt's treatment plan and progress towards established goals. Pt will be seen by a physical therapist minimally every 6th visit or every 30 days.          Hernando Hebert PTA

## 2023-07-11 ENCOUNTER — TELEPHONE (OUTPATIENT)
Dept: REHABILITATION | Facility: HOSPITAL | Age: 53
End: 2023-07-11
Payer: MEDICAID

## 2023-07-11 NOTE — TELEPHONE ENCOUNTER
Called regarding missing appointment today. Was unable to leave a message due to his voice mail not being set up.

## 2023-08-01 ENCOUNTER — OFFICE VISIT (OUTPATIENT)
Dept: DIABETES | Facility: CLINIC | Age: 53
End: 2023-08-01
Payer: MEDICAID

## 2023-08-01 DIAGNOSIS — E66.9 OBESITY (BMI 30-39.9): ICD-10-CM

## 2023-08-01 DIAGNOSIS — E78.5 DYSLIPIDEMIA ASSOCIATED WITH TYPE 2 DIABETES MELLITUS: ICD-10-CM

## 2023-08-01 DIAGNOSIS — E11.69 DYSLIPIDEMIA ASSOCIATED WITH TYPE 2 DIABETES MELLITUS: ICD-10-CM

## 2023-08-01 DIAGNOSIS — G47.33 OBSTRUCTIVE SLEEP APNEA SYNDROME: ICD-10-CM

## 2023-08-01 DIAGNOSIS — K76.0 NAFLD (NONALCOHOLIC FATTY LIVER DISEASE): ICD-10-CM

## 2023-08-01 DIAGNOSIS — I10 ESSENTIAL HYPERTENSION: ICD-10-CM

## 2023-08-01 DIAGNOSIS — E11.42 DIABETIC POLYNEUROPATHY ASSOCIATED WITH TYPE 2 DIABETES MELLITUS: ICD-10-CM

## 2023-08-01 DIAGNOSIS — E11.65 TYPE 2 DIABETES MELLITUS WITH HYPERGLYCEMIA, WITHOUT LONG-TERM CURRENT USE OF INSULIN: Primary | ICD-10-CM

## 2023-08-01 PROCEDURE — 3052F PR MOST RECENT HEMOGLOBIN A1C LEVEL 8.0 - < 9.0%: ICD-10-PCS | Mod: CPTII,95,, | Performed by: PHYSICIAN ASSISTANT

## 2023-08-01 PROCEDURE — 4010F PR ACE/ARB THEARPY RXD/TAKEN: ICD-10-PCS | Mod: CPTII,95,, | Performed by: PHYSICIAN ASSISTANT

## 2023-08-01 PROCEDURE — 3052F HG A1C>EQUAL 8.0%<EQUAL 9.0%: CPT | Mod: CPTII,95,, | Performed by: PHYSICIAN ASSISTANT

## 2023-08-01 PROCEDURE — 99214 OFFICE O/P EST MOD 30 MIN: CPT | Mod: 95,,, | Performed by: PHYSICIAN ASSISTANT

## 2023-08-01 PROCEDURE — 99214 PR OFFICE/OUTPT VISIT, EST, LEVL IV, 30-39 MIN: ICD-10-PCS | Mod: 95,,, | Performed by: PHYSICIAN ASSISTANT

## 2023-08-01 PROCEDURE — 4010F ACE/ARB THERAPY RXD/TAKEN: CPT | Mod: CPTII,95,, | Performed by: PHYSICIAN ASSISTANT

## 2023-08-01 RX ORDER — ATORVASTATIN CALCIUM 20 MG/1
20 TABLET, FILM COATED ORAL NIGHTLY
Qty: 90 TABLET | Refills: 0 | Status: SHIPPED | OUTPATIENT
Start: 2023-08-01 | End: 2026-04-26

## 2023-08-01 RX ORDER — PEN NEEDLE, DIABETIC 30 GX3/16"
1 NEEDLE, DISPOSABLE MISCELLANEOUS
Qty: 300 EACH | Refills: 0 | Status: SHIPPED | OUTPATIENT
Start: 2023-08-01 | End: 2024-07-31

## 2023-08-01 RX ORDER — DAPAGLIFLOZIN 10 MG/1
10 TABLET, FILM COATED ORAL DAILY
Qty: 90 TABLET | Refills: 0 | Status: SHIPPED | OUTPATIENT
Start: 2023-08-01

## 2023-08-01 RX ORDER — METFORMIN HYDROCHLORIDE 1000 MG/1
1000 TABLET ORAL 2 TIMES DAILY
Qty: 180 TABLET | Refills: 0 | Status: SHIPPED | OUTPATIENT
Start: 2023-08-01

## 2023-08-01 RX ORDER — GLIMEPIRIDE 4 MG/1
4 TABLET ORAL
Qty: 180 TABLET | Refills: 0 | Status: SHIPPED | OUTPATIENT
Start: 2023-08-01

## 2023-08-01 RX ORDER — SEMAGLUTIDE 1.34 MG/ML
1 INJECTION, SOLUTION SUBCUTANEOUS
Qty: 9 ML | Refills: 0 | Status: SHIPPED | OUTPATIENT
Start: 2023-08-01 | End: 2024-07-31

## 2023-08-01 NOTE — PROGRESS NOTES
PCP: Primary Doctor No    Subjective:     Chief Complaint: Diabetes     Established Patient - Audio Only Telehealth Visit     The patient location is: Home  The chief complaint leading to consultation is: Diabetes follow up  Visit type: Virtual visit with audio only (telephone)  Total Time Spent with Patient:6 minutes     The reason for the audio only service rather than synchronous audio and video virtual visit was related to technical difficulties or patient preference/necessity.     Each patient to whom I provide medical services by telemedicine is:  (1) informed of the relationship between the physician and patient and the respective role of any other health care provider with respect to management of the patient; and (2) notified that they may decline to receive medical services by telemedicine and may withdraw from such care at any time. Patient verbally consented to receive this service via voice-only telephone call.    This service was not originating from a related E/M service provided within the previous 7 days nor will  to an E/M service or procedure within the next 24 hours or my soonest available appointment.  Prevailing standard of care was able to be met in this audio-only visit.      HISTORY OF PRESENT ILLNESS: 53 y.o.   male presenting for diabetes management.   The patient's last visit with me was on 6/14/2023.  Patient has had Type II diabetes since approximately 10 years ago. .  Pertinent to decision making is the following comorbidities: HTN, HLD, Fatty Liver, Obesity by BMI and SOPHIE   Patient has the following Diabetes complications: with diabetic polyneuropathy  He  has attended diabetes education in the past.     Patient's most recent A1c of 8.1% was completed 1 months ago.   Patient states since His last A1c His blood glucose levels have been high  throughout the day .   Patient monitors blood glucose 3 times per day with meter : Fasting, Before Lunch and Before  "Dinner.  Patient blood glucose monitoring device will be uploaded into [employer] today.    Patient reports fasting up to 150 and up to 200 after meals.   Patient endorses the following diabetes related symptoms: None.  Patient is due today for the following diabetes-related health maintenance standards: Eye Exam and Colorectal Cancer screening .   He denies recent hospital admissions or emergency room visits.   He denies having hypoglycemia.   Patient's concerns today include glycemic control. Of note, patient is moving out of state next weekend.     CURRENT DM MEDICATIONS:   Humalog correction dosing every 3 hours as below  Metformin 1000 mg BID    Farxiga 10 mg daily  Glimepiride 4 mg before breakfast and 4 mg before dinner   Ozempic 1 mg weekly      Humalog Correction Dosing every 3 hours as needed OUTSIDE OF EATING  If  - 250, may take 4 units of Humalog  If  - 300, may take 8 units of Humalog   If  - 350, may take 12 units of Humalog  If  - 400, may take 16 units of Humalog  If +, may take 20 units of Humalog    Patient has failed the following Diabetes medications:   Trulicity / Victoza  - nausea   Toujeo   Humalog   Januvia  Jardiance - blurry vision   Glimepiride      Labs Reviewed.       No results found for: "CPEPTIDE"  No results found for: "GLUTAMICACID"       //   , There is no height or weight on file to calculate BMI.  His blood sugar in clinic today is:      Review of Systems   Constitutional:  Negative for activity change, appetite change, chills and fever.   HENT:  Negative for dental problem, mouth sores, nosebleeds, sore throat and trouble swallowing.    Eyes:  Negative for pain and discharge.   Respiratory:  Negative for shortness of breath, wheezing and stridor.    Cardiovascular:  Negative for chest pain, palpitations and leg swelling.   Gastrointestinal:  Negative for abdominal pain, diarrhea, nausea and vomiting.   Endocrine: Negative for polydipsia, " "polyphagia and polyuria.   Genitourinary:  Negative for dysuria, frequency and urgency.   Musculoskeletal:  Negative for joint swelling and myalgias.   Skin:  Negative for rash and wound.   Neurological:  Negative for dizziness, syncope, weakness and headaches.   Psychiatric/Behavioral:  Negative for behavioral problems and dysphoric mood.          Diabetes Management Status  Statin: Taking  ACE/ARB: Not taking    Screening or Prevention Patient's value Goal Complete/Controlled?   HgA1C Testing and Control   Lab Results   Component Value Date    HGBA1C 8.1 (H) 06/14/2023    HGBA1C 8.1 (H) 06/14/2023    HGBA1C 8.1 (H) 06/14/2023      Annually/Less than 8% Yes   Lipid profile : 10/12/2022 Annually No   LDL control Lab Results   Component Value Date    LDLCALC 76.0 10/12/2022    Annually/Less than 100 mg/dl  No   Nephropathy screening Lab Results   Component Value Date    MICALBCREAT 25.0 10/12/2022     No results found for: "PROTEINUA" Annually No   Blood pressure BP Readings from Last 1 Encounters:   12/19/22 134/87    Less than 140/90 No   Dilated retinal exam : 06/16/2022 Annually No    Foot exam   : 12/19/2022 Annually Yes     Social History     Socioeconomic History    Marital status:    Tobacco Use    Smoking status: Former     Current packs/day: 0.00   Substance and Sexual Activity    Alcohol use: Never    Drug use: Never    Sexual activity: Yes     Partners: Female     Past Medical History:   Diagnosis Date    Arthritis     Chronic bilateral low back pain without sciatica 7/27/2020    X-ray Lumbar Spine December, 2019: Dextroscoliosis of the lumbar spine is noted.  There is minimal grade 1 retrolisthesis of L2 on L3 and L3 on L4.  No fracture is seen.  Degenerative changes are noted with spurring of the endplates and lower lumbar facet arthropathy.  Mild disc space narrowing at L2-L3.    Class 2 severe obesity due to excess calories with serious comorbidity and body mass index (BMI) of 38.0 to 38.9 in " adult 9/22/2020    Diabetes mellitus, type 2     Essential hypertension 1/21/2020    Hepatosplenomegaly 10/7/2020    US ABDOMEN LIMITED 09/30/2020 FINDINGS: Liver: Increased in size, measuring 21.3 cm. Homogeneous increased echotexture. No focal hepatic lesions. Gallbladder: No calculi, wall thickening, or pericholecystic fluid. No sonographic Hdez's sign. Biliary system: The common duct is not dilated, measuring 5 mm. No intrahepatic ductal dilatation. Spleen: Increased in size and echotexture, measuring 15.9    Hyperlipidemia     Hypertension     Lesion of spleen 10/7/2020    US ABDOMEN LIMITED 09/30/2020 Spleen: Increased in size and echotexture, measuring 15.9 cm. There is heterogeneous area in the spleen which measures 3 x 2.2 x 2.1 cm with more central area of isoechogenicity with peripheral hypoechoic region. IMPRESSION: ...Indeterminate 3 cm heterogeneous mass in the spleen, potentially a hemangioma although other etiologies possible as well. Consider attention o    NAFLD (nonalcoholic fatty liver disease) 10/7/2020    US ABDOMEN LIMITED 09/30/2020 FINDINGS: Liver: Increased in size, measuring 21.3 cm. Homogeneous increased echotexture. No focal hepatic lesions. Gallbladder: No calculi, wall thickening, or pericholecystic fluid. No sonographic Hdez's sign. Biliary system: The common duct is not dilated, measuring 5 mm. No intrahepatic ductal dilatation. Spleen: Increased in size and echotexture, measuring 15.9    Obstructive sleep apnea syndrome 7/27/2020    Spondylosis of lumbar region without myelopathy or radiculopathy 7/27/2020    Type 2 diabetes mellitus with diabetic polyneuropathy, without long-term current use of insulin 7/27/2020    Type 2 diabetes mellitus with other specified complication 4/21/2020    Uncontrolled type 2 diabetes mellitus with hyperglycemia 4/21/2020       Objective:      Physical Exam  Neurological:      Mental Status: He is alert and oriented to person, place, and time.  Mental status is at baseline.   Psychiatric:         Mood and Affect: Mood normal.         Behavior: Behavior normal.         Thought Content: Thought content normal.         Judgment: Judgment normal.           Assessment / Plan:     Type 2 diabetes mellitus with hyperglycemia, without long-term current use of insulin    Diabetic polyneuropathy associated with type 2 diabetes mellitus    Essential hypertension    Dyslipidemia associated with type 2 diabetes mellitus    NAFLD (nonalcoholic fatty liver disease)    Obstructive sleep apnea syndrome    Obesity (BMI 30-39.9)        Additional Plan Details:    - POCT Glucose  - Encouraged continuation of lifestyle changes including regular exercise and limiting carbohydrates to 30-45 grams per meal threes times daily and 15 grams per snack with a limit of two daily.   - Encouraged continued monitoring of blood glucose with maintenance of 3 times daily at Fasting, Before Lunch and Before Dinner.   - Current DM Medication Regimen: continue Farxiga 10 mg daily. Continue Metformin 1000 mg BID. Continue Glimepiride to 4 mg before breakfast (take 15 mins before) and 4 mg before dinner. Continue Humalog correction dosing every 3 hours. Continue Ozempic 1 mg weekly.  - 90 day refills on all meds to [facility] in [place]  - Health Maintenance standards addressed today: Eye Exam - will be completed within [facility] system and scheduled today and Colorectal Cancer screening - discussed; would like to discuss Fit Kit with PCP .   - Nursing Visit: Patient is below goal range for nursing visit for age group and will not need nursing visit at this time .   - Follow up PRN.     Humalog Correction Dosing every 3 hours as needed OUTSIDE OF EATING  If  - 250, may take 4 units of Humalog  If  - 300, may take 8 units of Humalog   If  - 350, may take 12 units of Humalog  If  - 400, may take 16 units of Humalog  If +, may take 20 units of  Raymundo Pringle PA-C  Diabetes Management

## 2023-10-17 ENCOUNTER — DOCUMENTATION ONLY (OUTPATIENT)
Dept: REHABILITATION | Facility: HOSPITAL | Age: 53
End: 2023-10-17
Payer: MEDICAID

## 2023-10-17 PROBLEM — R29.898 DECREASED STRENGTH OF LOWER EXTREMITY: Status: RESOLVED | Noted: 2023-06-21 | Resolved: 2023-10-17

## 2023-10-17 PROBLEM — M25.672 DECREASED RANGE OF MOTION OF LEFT ANKLE: Status: RESOLVED | Noted: 2023-06-21 | Resolved: 2023-10-17

## 2023-10-17 PROBLEM — Z74.09 DECREASED FUNCTIONAL MOBILITY AND ENDURANCE: Status: RESOLVED | Noted: 2023-06-21 | Resolved: 2023-10-17

## 2023-10-17 NOTE — PROGRESS NOTES
[facility]  Physical Therapy Discharge Note    Name: Jean Claude Ocasio  Clinic Number: [id number]    Therapy Diagnosis: No diagnosis found.  Physician: No ref. provider found    Physician Orders: Physical Therapy Evaluation and Treat  Medical Diagnosis from Referral:   M72.2 (ICD-10-CM) - Plantar fasciitis of left foot   M79.672 (ICD-10-CM) - Left foot pain   Evaluation Date: 6/21/2023    Date of Last visit: 06/26/2023  Total Visits Received: 1    ASSESSMENT        Discharge reason: Patient has not attended therapy since 06/26/2023    Discharge FOTO Score: not applicable     Goals: see last treatment note.    PLAN   This patient is discharged from PT.    Ashanti Martinez, PT, DPT